# Patient Record
Sex: FEMALE | Race: WHITE
[De-identification: names, ages, dates, MRNs, and addresses within clinical notes are randomized per-mention and may not be internally consistent; named-entity substitution may affect disease eponyms.]

---

## 2017-11-29 ENCOUNTER — HOSPITAL ENCOUNTER (EMERGENCY)
Dept: HOSPITAL 83 - ED | Age: 27
Discharge: HOME | End: 2017-11-29
Payer: SELF-PAY

## 2017-11-29 VITALS — BODY MASS INDEX: 45.99 KG/M2 | HEIGHT: 66.97 IN | WEIGHT: 293 LBS

## 2017-11-29 DIAGNOSIS — R11.0: ICD-10-CM

## 2017-11-29 DIAGNOSIS — F41.9: ICD-10-CM

## 2017-11-29 DIAGNOSIS — R07.9: Primary | ICD-10-CM

## 2017-11-29 DIAGNOSIS — I10: ICD-10-CM

## 2017-11-29 DIAGNOSIS — R00.2: ICD-10-CM

## 2017-11-29 LAB
ALBUMIN SERPL-MCNC: 4.1 GM/DL (ref 3.1–4.5)
ALP SERPL-CCNC: 89 U/L (ref 45–117)
ALT SERPL W P-5'-P-CCNC: 37 U/L (ref 12–78)
APTT PPP: 25.9 SECONDS (ref 20.8–31.5)
AST SERPL-CCNC: 20 IU/L (ref 3–35)
BASOPHILS # BLD AUTO: 0.1 10*3/UL (ref 0–0.1)
BASOPHILS NFR BLD AUTO: 0.3 % (ref 0–1)
BUN SERPL-MCNC: 10 MG/DL (ref 7–24)
CHLORIDE SERPL-SCNC: 105 MMOL/L (ref 98–107)
CK MB SERPL-MCNC: < 0.5 NG/ML (ref 0.5–3.6)
CK SERPL-CCNC: 64 U/L (ref 26–192)
CREAT SERPL-MCNC: 0.73 MG/DL (ref 0.55–1.02)
EOSINOPHIL # BLD AUTO: 0.3 10*3/UL (ref 0–0.4)
EOSINOPHIL # BLD AUTO: 1.5 % (ref 1–4)
ERYTHROCYTE [DISTWIDTH] IN BLOOD BY AUTOMATED COUNT: 13 % (ref 0–14.5)
HCT VFR BLD AUTO: 44 % (ref 37–47)
HGB BLD-MCNC: 14.4 G/DL (ref 12–16)
INR BLD: 0.9 (ref 2–3.5)
LYMPHOCYTES # BLD AUTO: 3.4 10*3/UL (ref 1.3–4.4)
LYMPHOCYTES NFR BLD AUTO: 19.7 % (ref 27–41)
MCH RBC QN AUTO: 28.7 PG (ref 27–31)
MCHC RBC AUTO-ENTMCNC: 32.7 G/DL (ref 33–37)
MCV RBC AUTO: 87.6 FL (ref 81–99)
MONOCYTES # BLD AUTO: 1.1 10*3/UL (ref 0.1–1)
MONOCYTES NFR BLD MANUAL: 6.3 % (ref 3–9)
NEUT #: 12.2 10*3/UL (ref 2.3–7.9)
NEUT %: 71.8 % (ref 47–73)
NRBC BLD QL AUTO: 0 % (ref 0–0)
PLATELET # BLD AUTO: 327 10*3/UL (ref 130–400)
PMV BLD AUTO: 10.4 FL (ref 9.6–12.3)
POTASSIUM SERPL-SCNC: 3.8 MMOL/L (ref 3.5–5.1)
PROT SERPL-MCNC: 7.9 GM/DL (ref 6.4–8.2)
RBC # BLD AUTO: 5.02 10*6/UL (ref 4.1–5.1)
SODIUM SERPL-SCNC: 138 MMOL/L (ref 136–145)
T4 FREE SERPL-MCNC: 0.73 NG/DL (ref 0.76–1.46)
TROPONIN I SERPL-MCNC: < 0.015 NG/ML (ref ?–0.04)
TSH SERPL DL<=0.005 MIU/L-ACNC: 3.46 UIU/ML (ref 0.36–4.75)
WBC NRBC COR # BLD AUTO: 17 10*3/UL (ref 4.8–10.8)

## 2017-11-30 ENCOUNTER — HOSPITAL ENCOUNTER (EMERGENCY)
Dept: HOSPITAL 83 - ED | Age: 27
Discharge: HOME | End: 2017-11-30
Payer: SELF-PAY

## 2017-11-30 VITALS — WEIGHT: 293 LBS | HEIGHT: 55 IN

## 2017-11-30 DIAGNOSIS — F41.9: Primary | ICD-10-CM

## 2017-11-30 LAB
ALBUMIN SERPL-MCNC: 3.9 GM/DL (ref 3.1–4.5)
ALP SERPL-CCNC: 83 U/L (ref 45–117)
ALT SERPL W P-5'-P-CCNC: 34 U/L (ref 12–78)
AST SERPL-CCNC: 19 IU/L (ref 3–35)
BASOPHILS # BLD AUTO: 0 10*3/UL (ref 0–0.1)
BASOPHILS NFR BLD AUTO: 0.2 % (ref 0–1)
BUN SERPL-MCNC: 9 MG/DL (ref 7–24)
CHLORIDE SERPL-SCNC: 106 MMOL/L (ref 98–107)
CREAT SERPL-MCNC: 0.63 MG/DL (ref 0.55–1.02)
EOSINOPHIL # BLD AUTO: 0.2 10*3/UL (ref 0–0.4)
EOSINOPHIL # BLD AUTO: 1.3 % (ref 1–4)
ERYTHROCYTE [DISTWIDTH] IN BLOOD BY AUTOMATED COUNT: 12.9 % (ref 0–14.5)
HCT VFR BLD AUTO: 42.9 % (ref 37–47)
HGB BLD-MCNC: 14.2 G/DL (ref 12–16)
LYMPHOCYTES # BLD AUTO: 2.6 10*3/UL (ref 1.3–4.4)
LYMPHOCYTES NFR BLD AUTO: 17.4 % (ref 27–41)
MCH RBC QN AUTO: 28.9 PG (ref 27–31)
MCHC RBC AUTO-ENTMCNC: 33.1 G/DL (ref 33–37)
MCV RBC AUTO: 87.4 FL (ref 81–99)
MONOCYTES # BLD AUTO: 0.8 10*3/UL (ref 0.1–1)
MONOCYTES NFR BLD MANUAL: 5.1 % (ref 3–9)
NEUT #: 11.2 10*3/UL (ref 2.3–7.9)
NEUT %: 75.2 % (ref 47–73)
NRBC BLD QL AUTO: 0 % (ref 0–0)
PLATELET # BLD AUTO: 307 10*3/UL (ref 130–400)
PMV BLD AUTO: 10.3 FL (ref 9.6–12.3)
POTASSIUM SERPL-SCNC: 4 MMOL/L (ref 3.5–5.1)
PROT SERPL-MCNC: 7.5 GM/DL (ref 6.4–8.2)
RBC # BLD AUTO: 4.91 10*6/UL (ref 4.1–5.1)
SODIUM SERPL-SCNC: 140 MMOL/L (ref 136–145)
WBC NRBC COR # BLD AUTO: 14.9 10*3/UL (ref 4.8–10.8)

## 2019-01-08 ENCOUNTER — HOSPITAL ENCOUNTER (EMERGENCY)
Dept: HOSPITAL 83 - ED | Age: 29
Discharge: HOME | End: 2019-01-08
Payer: COMMERCIAL

## 2019-01-08 VITALS — WEIGHT: 293 LBS | HEIGHT: 66.97 IN | BODY MASS INDEX: 45.99 KG/M2

## 2019-01-08 DIAGNOSIS — Z90.49: ICD-10-CM

## 2019-01-08 DIAGNOSIS — N20.1: Primary | ICD-10-CM

## 2019-01-08 LAB
ALBUMIN SERPL-MCNC: 4.1 GM/DL (ref 3.1–4.5)
ALP SERPL-CCNC: 94 U/L (ref 45–117)
ALT SERPL W P-5'-P-CCNC: 35 U/L (ref 12–78)
APPEARANCE UR: (no result)
AST SERPL-CCNC: 20 IU/L (ref 3–35)
BACTERIA #/AREA URNS HPF: (no result) /[HPF]
BASOPHILS # BLD AUTO: 0 10*3/UL (ref 0–0.1)
BASOPHILS NFR BLD AUTO: 0.3 % (ref 0–1)
BILIRUB UR QL STRIP: (no result)
BUN SERPL-MCNC: 10 MG/DL (ref 7–24)
CHLORIDE SERPL-SCNC: 109 MMOL/L (ref 98–107)
COLOR UR: YELLOW
CREAT SERPL-MCNC: 1 MG/DL (ref 0.55–1.02)
EOSINOPHIL # BLD AUTO: 0.3 10*3/UL (ref 0–0.4)
EOSINOPHIL # BLD AUTO: 2 % (ref 1–4)
EPI CELLS #/AREA URNS HPF: (no result) /[HPF]
ERYTHROCYTE [DISTWIDTH] IN BLOOD BY AUTOMATED COUNT: 13.1 % (ref 0–14.5)
GLUCOSE UR QL: NEGATIVE
HCT VFR BLD AUTO: 46.3 % (ref 37–47)
HGB BLD-MCNC: 15.2 G/DL (ref 12–16)
HGB UR QL STRIP: (no result)
KETONES UR QL STRIP: NEGATIVE
LEUKOCYTE ESTERASE UR QL STRIP: NEGATIVE
LIPASE SERPL-CCNC: 80 U/L (ref 73–393)
LYMPHOCYTES # BLD AUTO: 2.3 10*3/UL (ref 1.3–4.4)
LYMPHOCYTES NFR BLD AUTO: 18.4 % (ref 27–41)
MCH RBC QN AUTO: 29.2 PG (ref 27–31)
MCHC RBC AUTO-ENTMCNC: 32.8 G/DL (ref 33–37)
MCV RBC AUTO: 88.9 FL (ref 81–99)
MONOCYTES # BLD AUTO: 0.9 10*3/UL (ref 0.1–1)
MONOCYTES NFR BLD MANUAL: 6.9 % (ref 3–9)
NEUT #: 8.9 10*3/UL (ref 2.3–7.9)
NEUT %: 71.9 % (ref 47–73)
NITRITE UR QL STRIP: NEGATIVE
NRBC BLD QL AUTO: 0 % (ref 0–0)
PH UR STRIP: 5.5 [PH] (ref 5–9)
PLATELET # BLD AUTO: 325 10*3/UL (ref 130–400)
PMV BLD AUTO: 10.4 FL (ref 9.6–12.3)
POTASSIUM SERPL-SCNC: 3.9 MMOL/L (ref 3.5–5.1)
PROT SERPL-MCNC: 8.3 GM/DL (ref 6.4–8.2)
RBC # BLD AUTO: 5.21 10*6/UL (ref 4.1–5.1)
RBC #/AREA URNS HPF: (no result) RBC/HPF (ref 0–2)
SODIUM SERPL-SCNC: 143 MMOL/L (ref 136–145)
SP GR UR: >= 1.03 (ref 1–1.03)
UROBILINOGEN UR STRIP-MCNC: 0.2 E.U./DL (ref 0.2–1)
WBC #/AREA URNS HPF: (no result) WBC/HPF (ref 0–5)
WBC NRBC COR # BLD AUTO: 12.4 10*3/UL (ref 4.8–10.8)

## 2019-01-25 ENCOUNTER — HOSPITAL ENCOUNTER (OUTPATIENT)
Dept: HOSPITAL 83 - US | Age: 29
Discharge: HOME | End: 2019-01-25
Attending: NURSE PRACTITIONER
Payer: COMMERCIAL

## 2019-01-25 DIAGNOSIS — T83.32XA: Primary | ICD-10-CM

## 2020-02-19 ENCOUNTER — HOSPITAL ENCOUNTER (OUTPATIENT)
Age: 30
Discharge: HOME | End: 2020-02-21
Payer: COMMERCIAL

## 2020-02-19 ENCOUNTER — OFFICE VISIT (OUTPATIENT)
Dept: FAMILY MEDICINE CLINIC | Age: 30
End: 2020-02-19
Payer: COMMERCIAL

## 2020-02-19 VITALS
WEIGHT: 293 LBS | OXYGEN SATURATION: 99 % | HEIGHT: 67 IN | DIASTOLIC BLOOD PRESSURE: 80 MMHG | BODY MASS INDEX: 45.99 KG/M2 | HEART RATE: 74 BPM | TEMPERATURE: 97.2 F | SYSTOLIC BLOOD PRESSURE: 128 MMHG

## 2020-02-19 LAB
BILIRUBIN, POC: ABNORMAL
BLOOD URINE, POC: ABNORMAL
CLARITY, POC: ABNORMAL
COLOR, POC: YELLOW
GLUCOSE URINE, POC: ABNORMAL
KETONES, POC: ABNORMAL
LEUKOCYTE EST, POC: ABNORMAL
NITRITE, POC: ABNORMAL
PH, POC: 7
PROTEIN, POC: ABNORMAL
SPECIFIC GRAVITY, POC: 1.02
UROBILINOGEN, POC: 0.2

## 2020-02-19 PROCEDURE — 87088 URINE BACTERIA CULTURE: CPT

## 2020-02-19 PROCEDURE — 99213 OFFICE O/P EST LOW 20 MIN: CPT | Performed by: FAMILY MEDICINE

## 2020-02-19 PROCEDURE — 81002 URINALYSIS NONAUTO W/O SCOPE: CPT | Performed by: FAMILY MEDICINE

## 2020-02-19 ASSESSMENT — ENCOUNTER SYMPTOMS
BACK PAIN: 0
TROUBLE SWALLOWING: 0
SORE THROAT: 0
VOMITING: 0
SINUS PAIN: 0
CHEST TIGHTNESS: 0
NAUSEA: 0
COUGH: 0
DIARRHEA: 0
CONSTIPATION: 0
SHORTNESS OF BREATH: 0
WHEEZING: 0
EYE PAIN: 0
ABDOMINAL PAIN: 0

## 2020-02-19 NOTE — PROGRESS NOTES
2/19/20    Name: Theo Cohen  GXO:[id number]   Sex:female   Age:29 y.o. Chief Complaint   Patient presents with    Dysuria     Patient has dysuria, frequency, and urgency for the past 3 days. She denies gross hematuria and fever. 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 HENT: Negative for congestion, ear pain, sinus pain, sore throat and trouble swallowing. Eyes: Negative for pain. Respiratory: Negative for cough, chest tightness, shortness of breath and wheezing. Cardiovascular: Negative for chest pain, palpitations and leg swelling. Gastrointestinal: Negative for abdominal pain, constipation, diarrhea, nausea and vomiting. Endocrine: Negative for cold intolerance and heat intolerance. Genitourinary: Positive for dysuria, frequency and urgency. Negative for difficulty urinating, hematuria and pelvic pain. Musculoskeletal: Negative for back pain, gait problem and joint swelling. Skin: Negative for rash and wound. Neurological: Negative for dizziness, syncope, light-headedness and headaches. Hematological: Negative for adenopathy. Psychiatric/Behavioral: Negative for confusion, sleep disturbance and suicidal ideas. No current outpatient medications on file. No Known Allergies   No past medical history on file. There are no active problems to display for this patient. No past surgical history on file.    Social History     Tobacco History     Smoking Status  Never Smoker    Smokeless Tobacco Use  Never Used          Alcohol History     Alcohol Use Status  Not Asked          Drug Use     Drug Use Status  Not Asked          Sexual Activity     Sexually Active  Not Asked            /80   Pulse 74   Temp 97.2 °F (36.2 °C)   Ht 5' 7\" (1.702 m)   Wt (!) 321 lb (145.6 kg) SpO2 99%   BMI 50.28 kg/m²     EXAM:   Physical Exam  Vitals signs and nursing note reviewed. Constitutional:       Appearance: Normal appearance. She is well-developed. HENT:      Head: Normocephalic and atraumatic. Right Ear: Tympanic membrane normal.      Left Ear: Tympanic membrane normal.      Nose: Nose normal.      Mouth/Throat:      Mouth: Mucous membranes are moist.   Eyes:      Pupils: Pupils are equal, round, and reactive to light. Neck:      Musculoskeletal: Normal range of motion. Cardiovascular:      Rate and Rhythm: Normal rate and regular rhythm. Pulmonary:      Effort: Pulmonary effort is normal.      Breath sounds: Normal breath sounds. Abdominal:      General: Bowel sounds are normal.      Palpations: Abdomen is soft. Tenderness: There is no right CVA tenderness or left CVA tenderness. Skin:     General: Skin is warm and dry. Neurological:      General: No focal deficit present. Mental Status: She is alert and oriented to person, place, and time. Wojciech was seen today for dysuria. Diagnoses and all orders for this visit:    Dysuria  Comments:  azo till urine cx is back friday  Orders:  -     POCT Urinalysis no Micro  -     Culture, Urine    Dehydration, mild  Comments:  push fluids  less tea  will culture urine and treat as indicated        I independently reviewed and updated the chief complaint, HPI, past medical and surgical history, medications, allergies and ROS as entered by the LPN. Seen by:   Ben Mccartney DO

## 2020-02-22 LAB — URINE CULTURE, ROUTINE: NORMAL

## 2020-02-24 ENCOUNTER — TELEPHONE (OUTPATIENT)
Dept: FAMILY MEDICINE CLINIC | Age: 30
End: 2020-02-24

## 2020-02-24 RX ORDER — CIPROFLOXACIN 500 MG/1
500 TABLET, FILM COATED ORAL 2 TIMES DAILY
Qty: 14 TABLET | Refills: 0 | Status: SHIPPED | OUTPATIENT
Start: 2020-02-24 | End: 2020-03-02

## 2020-03-22 ENCOUNTER — NURSE TRIAGE (OUTPATIENT)
Dept: OTHER | Facility: CLINIC | Age: 30
End: 2020-03-22

## 2020-03-22 NOTE — TELEPHONE ENCOUNTER
Reason for Disposition   General information question, no triage required and triager able to answer question    Protocols used: INFORMATION ONLY CALL-ADULT-    Caller reports she went to [place] for two days and is now required to get authorization to come back to work. Caller is not experiencing any flu like symptoms.

## 2020-05-12 ENCOUNTER — HOSPITAL ENCOUNTER (OUTPATIENT)
Age: 30
Discharge: HOME | End: 2020-05-14
Payer: COMMERCIAL

## 2020-05-12 PROCEDURE — U0003 INFECTIOUS AGENT DETECTION BY NUCLEIC ACID (DNA OR RNA); SEVERE ACUTE RESPIRATORY SYNDROME CORONAVIRUS 2 (SARS-COV-2) (CORONAVIRUS DISEASE [COVID-19]), AMPLIFIED PROBE TECHNIQUE, MAKING USE OF HIGH THROUGHPUT TECHNOLOGIES AS DESCRIBED BY CMS-2020-01-R: HCPCS

## 2020-05-13 LAB
SARS-COV-2: NOT DETECTED
SOURCE: NORMAL

## 2020-05-21 ENCOUNTER — HOSPITAL ENCOUNTER (OUTPATIENT)
Age: 30
Discharge: HOME | End: 2020-05-23
Payer: COMMERCIAL

## 2020-05-21 PROCEDURE — U0003 INFECTIOUS AGENT DETECTION BY NUCLEIC ACID (DNA OR RNA); SEVERE ACUTE RESPIRATORY SYNDROME CORONAVIRUS 2 (SARS-COV-2) (CORONAVIRUS DISEASE [COVID-19]), AMPLIFIED PROBE TECHNIQUE, MAKING USE OF HIGH THROUGHPUT TECHNOLOGIES AS DESCRIBED BY CMS-2020-01-R: HCPCS

## 2020-05-24 LAB
SARS-COV-2: NOT DETECTED
SOURCE: NORMAL

## 2020-06-25 ENCOUNTER — HOSPITAL ENCOUNTER (OUTPATIENT)
Dept: HOSPITAL 83 - LAB | Age: 30
Discharge: HOME | End: 2020-06-25
Attending: NURSE PRACTITIONER
Payer: COMMERCIAL

## 2020-06-25 DIAGNOSIS — I10: ICD-10-CM

## 2020-06-25 DIAGNOSIS — Z32.01: Primary | ICD-10-CM

## 2020-06-25 LAB
ALBUMIN SERPL-MCNC: 3.9 GM/DL (ref 3.1–4.5)
ALP SERPL-CCNC: 66 U/L (ref 45–117)
ALT SERPL W P-5'-P-CCNC: 36 U/L (ref 12–78)
AST SERPL-CCNC: 19 IU/L (ref 3–35)
B-HCG SERPL-ACNC: 1483 MIU/ML (ref 1–3)
BUN SERPL-MCNC: 10 MG/DL (ref 7–24)
CHLORIDE SERPL-SCNC: 107 MMOL/L (ref 98–107)
CHOLEST SERPL-MCNC: 164 MG/DL (ref ?–200)
CREAT SERPL-MCNC: 0.7 MG/DL (ref 0.55–1.02)
ERYTHROCYTE [DISTWIDTH] IN BLOOD BY AUTOMATED COUNT: 13.2 % (ref 0–14.5)
HCT VFR BLD AUTO: 41.2 % (ref 37–47)
HDLC SERPL-MCNC: 49 MG/DL (ref 40–60)
LDLC SERPL DIRECT ASSAY-MCNC: 103 MG/DL (ref 9–159)
MCH RBC QN AUTO: 29 PG (ref 27–31)
MCHC RBC AUTO-ENTMCNC: 32.8 G/DL (ref 33–37)
MCV RBC AUTO: 88.4 FL (ref 81–99)
NRBC BLD QL AUTO: 0 10*3/UL (ref 0–0)
PLATELET # BLD AUTO: 334 10*3/UL (ref 130–400)
PMV BLD AUTO: 10.3 FL (ref 9.6–12.3)
POTASSIUM SERPL-SCNC: 3.8 MMOL/L (ref 3.5–5.1)
PROT SERPL-MCNC: 7.7 GM/DL (ref 6.4–8.2)
RBC # BLD AUTO: 4.66 10*6/UL (ref 4.1–5.1)
SODIUM SERPL-SCNC: 137 MMOL/L (ref 136–145)
TRIGL SERPL-MCNC: 58 MG/DL (ref ?–150)
VLDLC SERPL CALC-MCNC: 12 MG/DL (ref 6–40)
WBC NRBC COR # BLD AUTO: 13.1 10*3/UL (ref 4.8–10.8)

## 2020-07-17 ENCOUNTER — HOSPITAL ENCOUNTER (OUTPATIENT)
Dept: ULTRASOUND IMAGING | Age: 30
Discharge: HOME | End: 2020-07-19
Payer: COMMERCIAL

## 2020-07-17 ENCOUNTER — HOSPITAL ENCOUNTER (OUTPATIENT)
Age: 30
Discharge: HOME | End: 2020-07-17
Payer: COMMERCIAL

## 2020-07-17 LAB
ABO/RH: NORMAL
ACETAMINOPHEN LEVEL: <5 MCG/ML (ref 10–30)
ANTIBODY SCREEN: NORMAL
BASOPHILS ABSOLUTE: 0.04 E9/L (ref 0–0.2)
BASOPHILS RELATIVE PERCENT: 0.3 % (ref 0–2)
BILIRUBIN URINE: NEGATIVE
BLOOD, URINE: NEGATIVE
CLARITY: CLEAR
COLOR: YELLOW
EOSINOPHILS ABSOLUTE: 0.2 E9/L (ref 0.05–0.5)
EOSINOPHILS RELATIVE PERCENT: 1.5 % (ref 0–6)
ETHANOL: <10 MG/DL (ref 0–0.08)
GLUCOSE URINE: NEGATIVE MG/DL
HCT VFR BLD CALC: 40 % (ref 34–48)
HEMOGLOBIN: 13.1 G/DL (ref 11.5–15.5)
IMMATURE GRANULOCYTES #: 0.07 E9/L
IMMATURE GRANULOCYTES %: 0.5 % (ref 0–5)
KETONES, URINE: NEGATIVE MG/DL
LEUKOCYTE ESTERASE, URINE: NEGATIVE
LYMPHOCYTES ABSOLUTE: 1.89 E9/L (ref 1.5–4)
LYMPHOCYTES RELATIVE PERCENT: 14.2 % (ref 20–42)
MCH RBC QN AUTO: 28.8 PG (ref 26–35)
MCHC RBC AUTO-ENTMCNC: 32.8 % (ref 32–34.5)
MCV RBC AUTO: 87.9 FL (ref 80–99.9)
MONOCYTES ABSOLUTE: 0.67 E9/L (ref 0.1–0.95)
MONOCYTES RELATIVE PERCENT: 5 % (ref 2–12)
NEUTROPHILS ABSOLUTE: 10.46 E9/L (ref 1.8–7.3)
NEUTROPHILS RELATIVE PERCENT: 78.5 % (ref 43–80)
NITRITE, URINE: NEGATIVE
PDW BLD-RTO: 13.3 FL (ref 11.5–15)
PH UA: 6 (ref 5–9)
PLATELET # BLD: 265 E9/L (ref 130–450)
PMV BLD AUTO: 10.3 FL (ref 7–12)
PROTEIN UA: NEGATIVE MG/DL
RBC # BLD: 4.55 E12/L (ref 3.5–5.5)
SALICYLATE, SERUM: <0.3 MG/DL (ref 0–30)
SPECIFIC GRAVITY UA: >=1.03 (ref 1–1.03)
TRICYCLIC ANTIDEPRESSANTS SCREEN SERUM: NEGATIVE NG/ML
UROBILINOGEN, URINE: 0.2 E.U./DL
WBC # BLD: 13.3 E9/L (ref 4.5–11.5)

## 2020-07-17 PROCEDURE — G0480 DRUG TEST DEF 1-7 CLASSES: HCPCS

## 2020-07-17 PROCEDURE — 86900 BLOOD TYPING SEROLOGIC ABO: CPT

## 2020-07-17 PROCEDURE — 86592 SYPHILIS TEST NON-TREP QUAL: CPT

## 2020-07-17 PROCEDURE — 36415 COLL VENOUS BLD VENIPUNCTURE: CPT

## 2020-07-17 PROCEDURE — 80307 DRUG TEST PRSMV CHEM ANLYZR: CPT

## 2020-07-17 PROCEDURE — 83020 HEMOGLOBIN ELECTROPHORESIS: CPT

## 2020-07-17 PROCEDURE — 86901 BLOOD TYPING SEROLOGIC RH(D): CPT

## 2020-07-17 PROCEDURE — 76817 TRANSVAGINAL US OBSTETRIC: CPT

## 2020-07-17 PROCEDURE — 87340 HEPATITIS B SURFACE AG IA: CPT

## 2020-07-17 PROCEDURE — 83021 HEMOGLOBIN CHROMOTOGRAPHY: CPT

## 2020-07-17 PROCEDURE — 86703 HIV-1/HIV-2 1 RESULT ANTBDY: CPT

## 2020-07-17 PROCEDURE — 81003 URINALYSIS AUTO W/O SCOPE: CPT

## 2020-07-17 PROCEDURE — 86850 RBC ANTIBODY SCREEN: CPT

## 2020-07-17 PROCEDURE — 85025 COMPLETE CBC W/AUTO DIFF WBC: CPT

## 2020-07-17 PROCEDURE — 86762 RUBELLA ANTIBODY: CPT

## 2020-07-20 LAB
HEPATITIS B SURFACE ANTIGEN INTERPRETATION: NORMAL
HIV-1 AND HIV-2 ANTIBODIES: NORMAL
RPR: NORMAL
RUBELLA ANTIBODY IGG: NORMAL

## 2020-07-27 LAB
Lab: NORMAL
REPORT: NORMAL
THIS TEST SENT TO: NORMAL

## 2020-09-02 ENCOUNTER — ROUTINE PRENATAL (OUTPATIENT)
Dept: OBGYN CLINIC | Age: 30
End: 2020-09-02
Payer: COMMERCIAL

## 2020-09-02 VITALS
HEART RATE: 70 BPM | TEMPERATURE: 97.2 F | DIASTOLIC BLOOD PRESSURE: 85 MMHG | WEIGHT: 293 LBS | BODY MASS INDEX: 52.94 KG/M2 | SYSTOLIC BLOOD PRESSURE: 122 MMHG

## 2020-09-02 PROCEDURE — 99203 OFFICE O/P NEW LOW 30 MIN: CPT | Performed by: OBSTETRICS & GYNECOLOGY

## 2020-09-02 PROCEDURE — 81002 URINALYSIS NONAUTO W/O SCOPE: CPT | Performed by: OBSTETRICS & GYNECOLOGY

## 2020-09-02 PROCEDURE — 76805 OB US >/= 14 WKS SNGL FETUS: CPT | Performed by: OBSTETRICS & GYNECOLOGY

## 2020-09-02 RX ORDER — DOCUSATE SODIUM 100 MG/1
100 CAPSULE, LIQUID FILLED ORAL DAILY
COMMUNITY
End: 2021-11-22

## 2020-09-02 NOTE — PATIENT INSTRUCTIONS
thinking about giving birth, the health of your baby, and becoming a parent. You can learn to cope with any anxiety and stress you feel. Follow-up care is a key part of your treatment and safety. Be sure to make and go to all appointments, and call your doctor if you are having problems. It's also a good idea to know your test results and keep a list of the medicines you take. How can you care for yourself at home? Reduce stress  · Ask for help with cooking and housekeeping. · Figure out who or what causes your stress. Avoid these people or situations as much as possible. · Relax every day. Taking 10- to 15-minute breaks can make a big difference. Take a walk, listen to music, or take a warm bath. · Learn relaxation techniques at prenatal or yoga class. Or buy a relaxation tape. · List your fears about having a baby and becoming a parent. Share the list with someone you trust. Decide which worries are really small, and try to let them go. Exercise  · If you did not exercise much before pregnancy, start slowly. Walking is best. [place] yourself, and do a little more every day. · Brisk walking, easy jogging, low-impact aerobics, water aerobics, and yoga are good choices. Some sports, such as scuba diving, horseback riding, downhill skiing, gymnastics, and water skiing, are not a good idea. · Try to do at least 2½ hours a week of moderate exercise, such as a fast walk. One way to do this is to be active 30 minutes a day, at least 5 days a week. It's fine to be active in blocks of 10 minutes or more throughout your day and week. · Wear loose clothing. And wear shoes and a bra that provide good support. · Warm up and cool down to start and finish your exercise. · If you want to use weights, be sure to use light weights. They reduce stress on your joints. Stay at the best weight for you  · Experts recommend that you gain about 1 pound a month during the first 3 months of your pregnancy.   · Experts recommend that you gain about 1 pound a week during your last 6 months of pregnancy, for a total weight gain of 25 to 35 pounds. · If you are underweight, you will need to gain more weight (about 28 to 40 pounds). · If you are overweight, you may not need to gain as much weight (about 15 to 25 pounds). · If you are gaining weight too fast, use common sense. Exercise every day, and limit sweets, fast foods, and fats. Choose lean meats, fruits, and vegetables. · If you are having twins or more, your doctor may refer you to a dietitian. Where can you learn more? Go to https://RazerpeBitiumeb.Sand 9. org and sign in to your The News Funnel account. Enter J077 in the TicketStumbler box to learn more about \"Weeks 14 to 18 of Your Pregnancy: Care Instructions. \"     If you do not have an account, please click on the \"Sign Up Now\" link. Current as of: February 11, 2020               Content Version: 12.5  © 8041-6398 [employer]. Care instructions adapted under license by [facility] ([facility]). If you have questions about a medical condition or this instruction, always ask your healthcare professional. [place] [address] any warranty or liability for your use of this information. Patient Education        Learning About When to Call Your Doctor During Pregnancy (Up to 20 Weeks)  Your Care Instructions     It's common to have concerns about what might be a problem during pregnancy. Although most pregnant women don't have any serious problems, it's important to know when to call your doctor if you have certain symptoms. These are general suggestions. Your doctor may give you some more information about when to call. When to call your doctor (up to 20 weeks)  [id number] anytime you think you may need emergency care. For example, call if:  · You passed out (lost consciousness). Call your doctor now or seek immediate medical care if:  · You have a fever. · You have vaginal bleeding.   · You are dizzy or lightheaded, or you feel like you may faint. · You have symptoms of a urinary tract infection. These may include:  ? Pain or burning when you urinate. ? A frequent need to urinate without being able to pass much urine. ? Pain in the flank, which is just below the rib cage and above the waist on either side of the back. ? Blood in your urine. · You have belly pain. · You think you are having contractions. · You have a sudden release of fluid from your vagina. Watch closely for changes in your health, and be sure to contact your doctor if:  · You have vaginal discharge that smells bad. · You have other concerns about your pregnancy. Follow-up care is a key part of your treatment and safety. Be sure to make and go to all appointments, and call your doctor if you are having problems. It's also a good idea to know your test results and keep a list of the medicines you take. Where can you learn more? Go to https://BerkÃ¤na Wirelesspeadaneweb.healthPhone [email or web address]. org and sign in to your Vive Nano account. Enter Z955 in the Godengo box to learn more about \"Learning About When to Call Your Doctor During Pregnancy (Up to 20 Weeks). \"     If you do not have an account, please click on the \"Sign Up Now\" link. Current as of: February 11, 2020               Content Version: 12.5  © 5979-0652 Healthwise, Incorporated. Care instructions adapted under license by [facility] ([facility]). If you have questions about a medical condition or this instruction, always ask your healthcare professional. [place] [address] any warranty or liability for your use of this information.

## 2020-09-13 NOTE — PROGRESS NOTES
Office Visit [phone]          [facility] FETAL MEDICINE  [address] [place] [address].  [place], [place]. [address]  Ph: [phone]    Fax: [phone]   September 2, 2020    RE: Esme Patterson  [birth year]  Dear Dr. Roosevelt Quiñones : Thank you for sending   Ms. Arredondo    for consultation and ultrasound in our office on  9.2.2020. REASON FOR CONSULTATION:    Preexisting Hypertension o10.0  - on Labetalol    Family history of inheritable trait o35.2    High risk pregnancy o09.89   Obesity o99.21   Abn Screen result o28.9  -  Yayo Hermosa  Fragile X      Her chart was reviewed, her medical, surgical , and OBG history was examined along with any supporting documents available to me .  Her recent clinical visits and notes were reviewed.  Her recent laboratory and pathology  testing was reviewed    Review of Systems :    CONSTITUTIONAL: No fever, no chills , no undue aches, pain    HEENT: No headache, no visual changes, no sore throat . No loss of smell, taste   PULM: No dyspnea, no cough   CARDIO:  No chest pains, no palpitations   GI: No N/V, no D/C, no diarrhea, no abdominal pain     : No dysuria, no vaginal bleeding or fluid leaking or discharge    She reports good fetal movement   PHYSICAL EXAMINATION: VSS - Afebrile  BMI 53   General Appearance: Healthy looking, alert , no acute distress.  Eyes: No pallor, no icterus, no photophobia.  Back: No CVA tenderness.  Abdomen: Soft, non-tender.  Extremities: No pretibial pitting edema    An ultrasound evaluation was done today. Please refer to the enclosed copy of the ultrasound report for further detailed information. ULTRASOUND IMPRESSION:    ? US is not diagnostic for fetal aneuploidy and may not detect all structural defects even if multiple exams are performed. ? Normal ultrasound findings cannot guarantee normal pregnancy outcomes. ? Within developmental and technical limits of ultrasound assessment,   ?  Vertex Female fetus @ 15w 3d  ? Active, responsive baby. The amniotic fluid volume appears normal.   ? The fetus is measuring appropriate for gestational age. ? There has been good interval growth and development . ? Fetal anatomy was reviewed and appears normal.  ? Transvaginal views of cervix appears to be closed, long, without significant funneling upon Valsalva. ? Soft markers for aneuploidy are examined and found to be favorable. - ~Many of the important findings cannot be fully assessed at this gestational age  ? Patient notes fetal movement, no cramping or contraction. No undue tenderness or distress during exam.   DISCUSSION : Overall favorable report today. The patient's fragile X carrier screen was reported as being in the gray zone. The test indicates that the patient is not a carrier of a fragile X expansion mutation. There is no known risk for her to have an affected pregnancy, however minor increases or decreases in the size of this mutation may occur when past to her project any such that the offspring could inherit a small pre-mutation, which could then carry down to her grandchildren , who could possibly be affected. WE discussed the ACMG and ACOG recommendations that diagnostic fetal testing ( amniocentesis ) be offered if mom is the carrier and has > 55repeats . Jaqueline Hanley has 48 and 30 -  ( Not recommended)  We discussed the risks inherent with her size as well . We discussed the pros and cons of  testing- and discussed why her daughter is not at risk, and perhaps this testing would best be deferred until she is ready to start her own family and can make that decision for herself. RECOMMENDATIONS:  1. Mid trimester concerns and precautions reviewed with patient. Discussed fetal movements and the role of  testing to help optimize growth and development and help predict/ avoid stress. Discussed trouble signs to watch for.   2. The patient is to continue to follow with you in your office for ongoing obstetric care. 3. I suggested the patient return for a follow-up assessment in4 weeks unless there is a clinical reason for her to return prior to that time. 4.  We have scheduled further [facility] visits today. 5. She is to call if she has any problems or questions prior to her next visit. 6. Further evaluation and management will be dependent on her clinical presentation and the results of her testing. Once again, thank you for allowing us to participate in the care of this patient and if we can be of any further assistance to you, please do not hesitate to contact us. Sincerely,      Milagros Aguila MD  I spent 31 minutes with the patient of which greater than 50% of the time was used to  the patient, discuss complications and problems related to her pregnancy, or coordinating her care. I answered all of her questions to her satisfaction.

## 2020-09-14 LAB
GLUCOSE URINE, POC: NORMAL
PROTEIN UA: NEGATIVE

## 2020-09-16 ENCOUNTER — HOSPITAL ENCOUNTER (EMERGENCY)
Age: 30
Discharge: HOME | End: 2020-09-16
Attending: EMERGENCY MEDICINE
Payer: COMMERCIAL

## 2020-09-16 VITALS
OXYGEN SATURATION: 99 % | DIASTOLIC BLOOD PRESSURE: 60 MMHG | HEART RATE: 89 BPM | WEIGHT: 293 LBS | RESPIRATION RATE: 16 BRPM | SYSTOLIC BLOOD PRESSURE: 118 MMHG | HEIGHT: 67 IN | BODY MASS INDEX: 45.99 KG/M2 | TEMPERATURE: 98.3 F

## 2020-09-16 LAB
BACTERIA: ABNORMAL /HPF
BILIRUBIN URINE: NEGATIVE
BLOOD, URINE: NEGATIVE
CLARITY: CLEAR
COLOR: YELLOW
EPITHELIAL CELLS, UA: ABNORMAL /HPF
GLUCOSE URINE: NEGATIVE MG/DL
KETONES, URINE: NEGATIVE MG/DL
LEUKOCYTE ESTERASE, URINE: NEGATIVE
NITRITE, URINE: NEGATIVE
PH UA: 6 (ref 5–9)
PROTEIN UA: NEGATIVE MG/DL
RBC UA: ABNORMAL /HPF (ref 0–2)
SPECIFIC GRAVITY UA: 1.02 (ref 1–1.03)
UROBILINOGEN, URINE: 0.2 E.U./DL
WBC UA: ABNORMAL /HPF (ref 0–5)

## 2020-09-16 PROCEDURE — 6370000000 HC RX 637 (ALT 250 FOR IP): Performed by: STUDENT IN AN ORGANIZED HEALTH CARE EDUCATION/TRAINING PROGRAM

## 2020-09-16 PROCEDURE — 6370000000 HC RX 637 (ALT 250 FOR IP): Performed by: EMERGENCY MEDICINE

## 2020-09-16 PROCEDURE — 81001 URINALYSIS AUTO W/SCOPE: CPT

## 2020-09-16 PROCEDURE — 99284 EMERGENCY DEPT VISIT MOD MDM: CPT

## 2020-09-16 PROCEDURE — 87088 URINE BACTERIA CULTURE: CPT

## 2020-09-16 PROCEDURE — 99283 EMERGENCY DEPT VISIT LOW MDM: CPT

## 2020-09-16 RX ORDER — ACETAMINOPHEN 500 MG
500 TABLET ORAL ONCE
Status: COMPLETED | OUTPATIENT
Start: 2020-09-16 | End: 2020-09-16

## 2020-09-16 RX ORDER — LIDOCAINE 4 G/G
1 PATCH TOPICAL ONCE
Status: DISCONTINUED | OUTPATIENT
Start: 2020-09-16 | End: 2020-09-16 | Stop reason: HOSPADM

## 2020-09-16 RX ORDER — LIDOCAINE 4 G/G
1 PATCH TOPICAL DAILY
Status: DISCONTINUED | OUTPATIENT
Start: 2020-09-16 | End: 2020-09-16

## 2020-09-16 RX ADMIN — ACETAMINOPHEN 500 MG: 500 TABLET ORAL at 04:35

## 2020-09-16 ASSESSMENT — PAIN SCALES - GENERAL
PAINLEVEL_OUTOF10: 4

## 2020-09-16 ASSESSMENT — ENCOUNTER SYMPTOMS
VOMITING: 0
DIARRHEA: 0
PHOTOPHOBIA: 0
COUGH: 0
ABDOMINAL PAIN: 0
NAUSEA: 0
SHORTNESS OF BREATH: 0
BACK PAIN: 1
TROUBLE SWALLOWING: 0

## 2020-09-16 ASSESSMENT — PAIN DESCRIPTION - PAIN TYPE: TYPE: ACUTE PAIN

## 2020-09-16 ASSESSMENT — PAIN DESCRIPTION - LOCATION: LOCATION: BACK

## 2020-09-16 ASSESSMENT — PAIN DESCRIPTION - ORIENTATION: ORIENTATION: LEFT

## 2020-09-16 ASSESSMENT — PAIN DESCRIPTION - DESCRIPTORS: DESCRIPTORS: SHOOTING;SHARP

## 2020-09-16 NOTE — ED PROVIDER NOTES
The patient is a 27year-old-female currently 17-weeks-pregnant who presents to the Emergency Department complaining of back pain. She stats she has been experiencing left sided back pain over the past three days that has become progressively worse. She tried taking Tylenol at home with little relief. Last dose of Tylenol was 500 mg last night around 11:30 PM. She also tried icy hot at home. There have been no complications with her pregnancy. Her OBGYN is Dr. Brandon Cook and she has an appointment with him today. Denies any fever, chills, dysuria, hematuria, vaginal bleeding, vaginal discharge, leakage of fluid, fall, trauma, injuries, history of prior back issues, or other acute symptoms or concerns. The history is provided by the patient. Back Pain   Location:  Thoracic spine  Quality:  Aching  Radiates to:  Does not radiate  Pain severity:  Moderate  Pain is:  Same all the time  Onset quality:  Gradual  Timing:  Constant  Progression:  Worsening  Chronicity:  New  Relieved by:  None tried  Worsened by:  Nothing  Ineffective treatments:  None tried  Associated symptoms: no abdominal pain, no chest pain, no dysuria, no fever, no headaches, no numbness and no weakness         Review of Systems   Constitutional: Negative for chills, fatigue and fever. HENT: Negative for congestion and trouble swallowing. Eyes: Negative for photophobia and visual disturbance. Respiratory: Negative for cough and shortness of breath. Cardiovascular: Negative for chest pain and leg swelling. Gastrointestinal: Negative for abdominal pain, diarrhea, nausea and vomiting. Genitourinary: Negative for difficulty urinating, dysuria, flank pain, frequency and hematuria. Musculoskeletal: Positive for back pain. Negative for neck pain. Skin: Negative for rash and wound. Neurological: Negative for dizziness, syncope, weakness, light-headedness, numbness and headaches. All other systems reviewed and are negative. Physical Exam  Vitals signs and nursing note reviewed. Constitutional:       General: She is not in acute distress. Appearance: She is well-developed. She is morbidly obese. She is not ill-appearing, toxic-appearing or diaphoretic. HENT:      Head: Normocephalic and atraumatic. Nose: Nose normal.      Mouth/Throat:      Lips: Pink. No lesions. Mouth: Mucous membranes are moist.   Eyes:      General: Lids are normal.   Neck:      Musculoskeletal: Normal range of motion and neck supple. Cardiovascular:      Rate and Rhythm: Normal rate and regular rhythm. Heart sounds: Normal heart sounds, S1 normal and S2 normal. No murmur. Pulmonary:      Effort: Pulmonary effort is normal. No respiratory distress. Breath sounds: Normal breath sounds and air entry. No decreased breath sounds, wheezing, rhonchi or rales. Abdominal:      General: Bowel sounds are normal.      Palpations: Abdomen is soft. Tenderness: There is no abdominal tenderness. There is no guarding or rebound. Musculoskeletal:      Thoracic back: She exhibits tenderness and pain (tenderness to palpation over the left paraspinal musculature. No spinous process tenderness, no  evidence of trauma. ). She exhibits no bony tenderness, no swelling, no edema and no deformity. Lumbar back: She exhibits tenderness and pain. She exhibits normal range of motion, no bony tenderness and no swelling. Skin:     General: Skin is warm and dry. Neurological:      Mental Status: She is alert and oriented to person, place, and time. Motor: No tremor or abnormal muscle tone. Coordination: Coordination normal.          Procedures     MDM  Number of Diagnoses or Management Options  Strain of lumbar region, initial encounter:   Diagnosis management comments: The patient is a 27year-old-female who presents to the ED complaining of back pain. She is hemodynamically stable, non-toxic, and in no acute distress.  Clinically patient's presentation and symptoms seem more muscular in etiology. She denied any red flag back pain signs or symptoms. However, since she is pregnant, did obtain UA, which was negative for acute infection. Treated with a dose of Tylenol and Lidocaine patch. Recommended to follow up with OB for her appointment today. She is to return here for worsening symptoms or other acute symptoms or concerns. Patient verbalized understanding and agreement to plan and discharge instructions.                       --------------------------------------------- PAST HISTORY ---------------------------------------------  Past Medical History:  has a past medical history of Hypertension and PCOS (polycystic ovarian syndrome). Past Surgical History:  has a past surgical history that includes Appendectomy (2013). Social History:  reports that she has never smoked. She has never used smokeless tobacco. She reports previous alcohol use. She reports that she does not use drugs. Family History: family history is not on file. The patients home medications have been reviewed. Allergies: Patient has no known allergies.     -------------------------------------------------- RESULTS -------------------------------------------------  Labs:  Results for orders placed or performed during the hospital encounter of 09/16/20   Urinalysis with Microscopic   Result Value Ref Range    Color, UA Yellow Straw/Yellow    Clarity, UA Clear Clear    Glucose, Ur Negative Negative mg/dL    Bilirubin Urine Negative Negative    Ketones, Urine Negative Negative mg/dL    Specific Gravity, UA 1.025 1.005 - 1.030    Blood, Urine Negative Negative    pH, UA 6.0 5.0 - 9.0    Protein, UA Negative Negative mg/dL    Urobilinogen, Urine 0.2 <2.0 E.U./dL    Nitrite, Urine Negative Negative    Leukocyte Esterase, Urine Negative Negative    WBC, UA 2-5 0 - 5 /HPF    RBC, UA 0-1 0 - 2 /HPF    Epithelial Cells, UA MODERATE /HPF    Bacteria, UA MANY (A) None

## 2020-09-18 LAB — URINE CULTURE, ROUTINE: NORMAL

## 2020-10-02 ENCOUNTER — ROUTINE PRENATAL (OUTPATIENT)
Dept: OBGYN CLINIC | Age: 30
End: 2020-10-02
Payer: COMMERCIAL

## 2020-10-02 VITALS
TEMPERATURE: 97.6 F | DIASTOLIC BLOOD PRESSURE: 85 MMHG | SYSTOLIC BLOOD PRESSURE: 124 MMHG | HEART RATE: 86 BPM | BODY MASS INDEX: 51.84 KG/M2 | WEIGHT: 293 LBS

## 2020-10-02 PROCEDURE — 99213 OFFICE O/P EST LOW 20 MIN: CPT | Performed by: OBSTETRICS & GYNECOLOGY

## 2020-10-02 PROCEDURE — 76811 OB US DETAILED SNGL FETUS: CPT | Performed by: OBSTETRICS & GYNECOLOGY

## 2020-10-02 PROCEDURE — 81002 URINALYSIS NONAUTO W/O SCOPE: CPT | Performed by: OBSTETRICS & GYNECOLOGY

## 2020-10-02 PROCEDURE — 99214 OFFICE O/P EST MOD 30 MIN: CPT | Performed by: OBSTETRICS & GYNECOLOGY

## 2020-10-02 PROCEDURE — 76817 TRANSVAGINAL US OBSTETRIC: CPT | Performed by: OBSTETRICS & GYNECOLOGY

## 2020-10-02 NOTE — PATIENT INSTRUCTIONS
Please arrive for your scheduled appointment at least 15 minutes early with your actual insurance card+ a photo ID. Also if you need any refills ordered or have questions, it may take up 48 hours to reply. Please allow ample time for your refills. Call me when you use last refill. Thank you for your cooperation. Any questions contact [facility] at [phone]. If you are experiencing an emergency and need immediate help, call 911 or go to go emergency room or labor and delivery. if you are sick, not feeling well or have an infectious process going on please reschedule your appointment by calling [phone]. Also if any family members are not feeling well, please do not bring them to your appointment. We appreciate your cooperation. We are doing this in order to protect our pregnant mothers+ their babies. Call your primary obstetrician with bleeding, leaking of fluid, abdominal tenderness, headache, blurry vision, epigastric pain and increased urinary frequency. Patient Education        Weeks 18 to 22 of Your Pregnancy: Care Instructions  Your Care Instructions     Your baby is continuing to develop quickly. At this stage, babies can now suck their thumbs,  firmly with their hands, and open and close their eyelids. Sometime between 18 and 22 weeks, you will start to feel your baby move. At first, these small fetal movements feel like fluttering or \"butterflies. \" Some women say that they feel like gas bubbles. As the baby grows, these movements will become stronger. You may also notice that your baby kicks and hiccups. During this time, you may find that your nausea and fatigue are gone. Overall, you may feel better and have more energy than you did in your first trimester. But you may also have new discomforts now, such as sleep problems or leg cramps. This care sheet can help you ease these discomforts. Follow-up care is a key part of your treatment and safety.  Be sure to make and go to all appointments, and Education        Learning About When to Call Your Doctor During Pregnancy (Up to 20 Weeks)  Your Care Instructions     It's common to have concerns about what might be a problem during pregnancy. Although most pregnant women don't have any serious problems, it's important to know when to call your doctor if you have certain symptoms. These are general suggestions. Your doctor may give you some more information about when to call. When to call your doctor (up to 20 weeks)  [id number] anytime you think you may need emergency care. For example, call if:  · You passed out (lost consciousness). Call your doctor now or seek immediate medical care if:  · You have a fever. · You have vaginal bleeding. · You are dizzy or lightheaded, or you feel like you may faint. · You have symptoms of a urinary tract infection. These may include:  ? Pain or burning when you urinate. ? A frequent need to urinate without being able to pass much urine. ? Pain in the flank, which is just below the rib cage and above the waist on either side of the back. ? Blood in your urine. · You have belly pain. · You think you are having contractions. · You have a sudden release of fluid from your vagina. Watch closely for changes in your health, and be sure to contact your doctor if:  · You have vaginal discharge that smells bad. · You have other concerns about your pregnancy. Follow-up care is a key part of your treatment and safety. Be sure to make and go to all appointments, and call your doctor if you are having problems. It's also a good idea to know your test results and keep a list of the medicines you take. Where can you learn more? Go to https://ny.PlanStan. org and sign in to your My Best Interest account. Enter N978 in the [employer] box to learn more about \"Learning About When to Call Your Doctor During Pregnancy (Up to 20 Weeks). \"     If you do not have an account, please click on the \"Sign Up Now\" link.  Current as of: February 11, 2020               Content Version: 12.5  © 8095-4319 [employer]. Care instructions adapted under license by [facility] ([facility]). If you have questions about a medical condition or this instruction, always ask your healthcare professional. [address] any warranty or liability for your use of this information.

## 2020-10-15 LAB
GLUCOSE URINE, POC: NEGATIVE
PROTEIN UA: POSITIVE

## 2020-10-15 NOTE — PROGRESS NOTES
[address] [place], [place]. [address]  Ph: [phone]    Fax: [phone]   October 2, 2020     RE: Jacquelin Grissom  [birth year]  Dear  [facility] : Thank you for sending  Ms. Arredondo for consultation and ultrasound in our office on  10.2.2020. REASON FOR CONSULTATION:   · Preexisting Hypertension o10.0  - on Labetalol 300mg TID  · Family history of inheritable trait o35.2   · High risk pregnancy o09.89  · Obesity o99.21  · Abn Screen result o28.9  -  Gara Nay  Fragile X   · Fetal Anatomy Survey z36  ·      · Her chart was reviewed, her medical, surgical , and OBG history was examined along with any supporting documents available to me . · Her recent clinical visits and notes were reviewed. · Her recent laboratory and pathology  testing was reviewed     Review of Systems :   · CONSTITUTIONAL: No fever, no chills , no undue aches, pain   · HEENT: No headache, no visual changes, no sore throat . No loss of smell, taste  · PULM: No dyspnea, no cough  · CARDIO:  No chest pains, no palpitations  · GI: No N/V, no D/C, no diarrhea, no abdominal pain   ·  : No dysuria, no vaginal bleeding or fluid leaking or discharge   · She reports good fetal movement   PHYSICAL EXAMINATION: VSS - Afebrile  BMI 52  · General Appearance: Healthy looking, alert , no acute distress. · Eyes: No pallor, no icterus, no photophobia. · Back: No CVA tenderness. · Abdomen: Soft, non-tender. · Extremities: No pretibial pitting edema     An ultrasound evaluation was done today. Please refer to the enclosed copy of the ultrasound report for further detailed information. ULTRASOUND IMPRESSION:    ? US is not diagnostic for fetal aneuploidy and may not detect all structural defects even if multiple exams are performed. ? Normal ultrasound findings cannot guarantee normal pregnancy outcomes. ? Within developmental and technical limits of ultrasound assessment,   ?  Vertex Female fetus @  19w 5d  ? Active, responsive baby. The amniotic fluid volume appears normal.   ? The fetus is measuring appropriate for gestational age. ? There has been good interval growth and development . ? Fetal anatomy was reviewed and appears normal.  - Views are limited by position, size  ? Transvaginal views of cervix appears to be closed, 41-44mm long, without significant funneling upon Valsalva. ? Soft markers for aneuploidy are examined and found to be favorable. - ~Many of the important findings cannot be fully assessed at this gestational age  ? Patient notes fetal movement, no cramping or contraction. No undue tenderness or distress during exam.   DISCUSSION : Overall favorable report today. ? The patient's fragile X carrier screen was reported as being in the gray zone. The test indicates that the patient is not a carrier of a fragile X expansion mutation. There is no known risk for her to have an affected pregnancy, however minor increases or decreases in the size of this mutation may occur when past to her project any such that the offspring could inherit a small pre-mutation, which could then carry down to her grandchildren , who could possibly be affected. ? WE discussed the ACMG and ACOG recommendations that diagnostic fetal testing ( amniocentesis ) be offered if mom is the carrier and has > 55repeats . Sol has 48 and 30 -  ( Not recommended)  ? We discussed the risks inherent with her size as well . ? We discussed the pros and cons of  testing- and discussed why her daughter is not at risk, and perhaps this testing would best be deferred until she is ready to start her own family and can make that decision for herself. RECOMMENDATIONS:  1. Mid trimester concerns and precautions reviewed with patient. Discussed fetal movements and the role of  testing to help optimize growth and development and help predict/ avoid stress.  Discussed trouble signs to watch for.  2. The patient is to continue to follow with you in your office for ongoing obstetric care. 3. I suggested the patient return for a follow-up assessment in 4 weeks unless there is a clinical reason for her to return prior to that time. 4.  We have scheduled further [facility] visits today. 5. She is to call if she has any problems or questions prior to her next visit. 6. Further evaluation and management will be dependent on her clinical presentation and the results of her testing. Once again, thank you for allowing us to participate in the care of this patient and if we can be of any further assistance to you, please do not hesitate to contact us. Sincerely,        Sara Zhong MD  I spent 26 minutes with the patient of which greater than 50% of the time was used to  the patient, discuss complications and problems related to her pregnancy, or coordinating her care.  I answered all of her questions to her

## 2020-10-29 ENCOUNTER — HOSPITAL ENCOUNTER (OUTPATIENT)
Age: 30
Discharge: HOME | End: 2020-10-29
Payer: COMMERCIAL

## 2020-10-29 PROCEDURE — 86336 INHIBIN A: CPT

## 2020-10-29 PROCEDURE — 84702 CHORIONIC GONADOTROPIN TEST: CPT

## 2020-10-29 PROCEDURE — 82677 ASSAY OF ESTRIOL: CPT

## 2020-10-29 PROCEDURE — 82105 ALPHA-FETOPROTEIN SERUM: CPT

## 2020-10-30 ENCOUNTER — ROUTINE PRENATAL (OUTPATIENT)
Dept: OBGYN CLINIC | Age: 30
End: 2020-10-30
Payer: COMMERCIAL

## 2020-10-30 VITALS
WEIGHT: 293 LBS | HEART RATE: 87 BPM | DIASTOLIC BLOOD PRESSURE: 72 MMHG | SYSTOLIC BLOOD PRESSURE: 107 MMHG | HEIGHT: 67 IN | TEMPERATURE: 97.7 F | BODY MASS INDEX: 45.99 KG/M2

## 2020-10-30 PROBLEM — Z3A.23 23 WEEKS GESTATION OF PREGNANCY: Status: ACTIVE | Noted: 2020-10-30

## 2020-10-30 LAB
GLUCOSE URINE, POC: NEGATIVE
PROTEIN UA: POSITIVE

## 2020-10-30 PROCEDURE — 76816 OB US FOLLOW-UP PER FETUS: CPT | Performed by: OBSTETRICS & GYNECOLOGY

## 2020-10-30 PROCEDURE — 99214 OFFICE O/P EST MOD 30 MIN: CPT | Performed by: OBSTETRICS & GYNECOLOGY

## 2020-10-30 PROCEDURE — 99213 OFFICE O/P EST LOW 20 MIN: CPT | Performed by: OBSTETRICS & GYNECOLOGY

## 2020-10-30 PROCEDURE — 81002 URINALYSIS NONAUTO W/O SCOPE: CPT | Performed by: OBSTETRICS & GYNECOLOGY

## 2020-10-30 NOTE — PATIENT INSTRUCTIONS
Patient Education        Weeks 22 to 26 of Your Pregnancy: Care Instructions  Your Care Instructions     As you enter your 7th month of pregnancy at week 26, your baby's lungs are growing stronger and getting ready to breathe. You may notice that your baby responds to the sound of your or your partner's voice. You may also notice that your baby does less turning and twisting and more squirming or jerking. Jerking often means that your baby has the hiccups. Hiccups are perfectly normal and are only temporary. You may want to think about attending a childbirth preparation class. This is also a good time to start thinking about whether you want to have pain medicine during labor. Most pregnant women are tested for gestational diabetes between weeks 25 and 28. Gestational diabetes occurs when your blood sugar level gets too high when you're pregnant. The test is important, because you can have gestational diabetes and not know it. But the condition can cause problems for your baby. Follow-up care is a key part of your treatment and safety. Be sure to make and go to all appointments, and call your doctor if you are having problems. It's also a good idea to know your test results and keep a list of the medicines you take. How can you care for yourself at home? Ease discomfort from your baby's kicking  · Change your position. Sometimes this will cause your baby to change position too. · Take a deep breath while you raise your arm over your head. Then breathe out while you drop your arm. Do Kegel exercises to prevent urine from leaking  · You can do Kegel exercises while you stand or sit. ? Squeeze the same muscles you would use to stop your urine. Your belly and thighs should not move. ? Hold the squeeze for 3 seconds, and then relax for 3 seconds. ? Start with 3 seconds. Then add 1 second each week until you are able to squeeze for 10 seconds. ? Repeat the exercise 10 to 15 times for each session.  Do three or more sessions each day. Ease or reduce swelling in your feet, ankles, hands, and fingers  · If your fingers are puffy, take off your rings. · Do not eat high-salt foods, such as potato chips. · Prop up your feet on a stool or couch as much as possible. Sleep with pillows under your feet. · Do not stand for long periods of time or wear tight shoes. · Wear support stockings. Where can you learn more? Go to https://Pantea.Postcard & Tag. org and sign in to your GoGo Tech account. Enter G264 in the [employer]ed box to learn more about \"Weeks 22 to 26 of Your Pregnancy: Care Instructions. \"     If you do not have an account, please click on the \"Sign Up Now\" link. Current as of: February 11, 2020               Content Version: 12.6  © 9947-8452 [employer], [employer]. Care instructions adapted under license by [facility] ([facility]). If you have questions about a medical condition or this instruction, always ask your healthcare professional. [address] [address] any warranty or liability for your use of this information. Patient Education        Learning About When to Call Your Doctor During Pregnancy (After 20 Weeks)  Your Care Instructions  It's common to have concerns about what might be a problem during pregnancy. Although most pregnant women don't have any serious problems, it's important to know when to call your doctor if you have certain symptoms or signs of labor. These are general suggestions. Your doctor may give you some more information about when to call. When to call your doctor (after 20 weeks)  Call 911 anytime you think you may need emergency care. For example, call if:  · You have severe vaginal bleeding. · You have sudden, severe pain in your belly. · You passed out (lost consciousness). · You have a seizure. · You see or feel the umbilical cord.   · You think you are about to deliver your baby and can't make it safely to the hospital.  Call your doctor now or seek immediate medical care if:  · You have vaginal bleeding. · You have belly pain. · You have a fever. · You have symptoms of preeclampsia, such as:  ? Sudden swelling of your face, hands, or feet. ? New vision problems (such as dimness, blurring, or seeing spots). ? A severe headache. · You have a sudden release of fluid from your vagina. (You think your water broke.)  · You think that you may be in labor. This means that you've had at least 6 contractions in an hour. · You notice that your baby has stopped moving or is moving much less than normal.  · You have symptoms of a urinary tract infection. These may include:  ? Pain or burning when you urinate. ? A frequent need to urinate without being able to pass much urine. ? Pain in the flank, which is just below the rib cage and above the waist on either side of the back. ? Blood in your urine. Watch closely for changes in your health, and be sure to contact your doctor if:  · You have vaginal discharge that smells bad. · You have skin changes, such as:  ? A rash. ? Itching. ? Yellow color to your skin. · You have other concerns about your pregnancy. If you have labor signs at 37 weeks or more  If you have signs of labor at 37 weeks or more, your doctor may tell you to call when your labor becomes more active. Symptoms of active labor include:  · Contractions that are regular. · Contractions that are less than 5 minutes apart. · Contractions that are hard to talk through. Follow-up care is a key part of your treatment and safety. Be sure to make and go to all appointments, and call your doctor if you are having problems. It's also a good idea to know your test results and keep a list of the medicines you take. Where can you learn more? Go to https://chpejohana.healthDailybreak Media. org and sign in to your tok tok tok account.  Enter  in the [employer] box to learn more about \"Learning About When to Call Your Doctor During Pregnancy (After 20 Weeks). \"     If you do not have an account, please click on the \"Sign Up Now\" link. Current as of: February 11, 2020               Content Version: 12.6  © 2006-2020 [employer]. Care instructions adapted under license by [facility] [address] If you have questions about a medical condition or this instruction, always ask your healthcare professional. [address] any warranty or liability for your use of this information. Please arrive for your scheduled appointment at least 15 minutes early with your actual insurance card+ a photo ID. Also if you need any refills ordered or have questions, it may take up 48 hours to reply. Please allow ample time for your refills. Call me when you use last refill. Thank you for your cooperation. Any questions contact Luis at [phone]. If you are experiencing an emergency and need immediate help, call 911 or go to go emergency room or labor and delivery. if you are sick, not feeling well or have an infectious process going on please reschedule your appointment by calling [phone]. Also if any family members are not feeling well, please do not bring them to your appointment. We appreciate your cooperation. We are doing this in order to protect our pregnant mothers+ their babies. Call your primary obstetrician with bleeding, leaking of fluid, abdominal tenderness, headache, blurry vision, epigastric pain and increased urinary frequency.

## 2020-11-01 LAB
AFP INTERPRETATION: NORMAL
AFP MOM: 1.27
AFP SPECIMEN: NORMAL
D-INHIBIN: 162 PG/ML
DATING: NORMAL
EER MATERNAL SCREEN AFP, HCG, EST, INH: NORMAL
ESTIMATED DUE DATE: NORMAL
FETUS COUNT: NORMAL
GESTATIONAL AGE CALC AT COLLECT: NORMAL
HISTORY OF ANEUPLOIDY?: NO
HISTORY/NEURAL TUBE DEFECTS: NORMAL
INSULIN DEP. DIABETIC: NO
MATERNAL AGE AT EDD: 30.9 YR
MATERNAL WEIGHT: NORMAL
MOM FOR HCG, 2ND TRIMESTER: 0.44
MOM FOR UE3: 0.97
MOM INHIBN: 1.25
PATIENT'S HCG, 2ND TRIMESTER: 6489 IU/L
PT AFP: 77 NG/ML
PT UE3: 2.75 NG/ML
RACE: NORMAL
SMOKING: NO

## 2020-11-13 NOTE — PROGRESS NOTES
OFFICE VISIT 10.30.2020       [facility]  [address] [place] [address].  [address] [facility], [place]. [address]  Ph: [phone]    Fax: [phone]   October 30, 2020     RE: Kennis Mcardle  [birth year]  Dear Dr. Lisa Peterson : Thank you for sending  Ms. Arredondo for consultation and followup ultrasound in our office on  10.30.2020. REASON FOR CONSULTATION:   ? Preexisting Hypertension o10.0  - on Labetalol 300mg TID  ? Family history of inheritable trait o35.2   ? High risk pregnancy o09.89  ? Obesity o99.21 - BMI 52  ? Abn Screen result o28.9  -  Heather Plants  Fragile X   ? Fetal Anatomy Survey z36  ? Breech o32.1   Her chart was reviewed, her medical, surgical , and OBG history was examined along with any supporting documents available to me .  ? Her recent clinical visits and notes were reviewed. ? Her recent laboratory and pathology  testing was reviewed  1. Hemoglobinopathy RBC screen reassuring   2. Quad screen reassuring   3. Progenity screen  negative for 28/29 diseases -gray zone Fragile X      Review of Systems :   ? CONSTITUTIONAL: No fever, no chills , no undue aches, pain   ? HEENT: No headache, no visual changes, no sore throat . No loss of smell, taste  ? PULM: No dyspnea, no cough  ? CARDIO:  No chest pains, no palpitations  ? GI: No N/V, no D/C, no diarrhea, no abdominal pain   ?  : No dysuria, no vaginal bleeding or fluid leaking or discharge   ? She reports good fetal movement   PHYSICAL EXAMINATION: VSS - Afebrile  BMI 52  ? General Appearance: Healthy looking, alert , no acute distress. ? Eyes: No pallor, no icterus, no photophobia. ? Back: No CVA tenderness. ? Abdomen: Soft, non-tender. ? Extremities: No pretibial pitting edema     An ultrasound evaluation was done today. Please refer to the enclosed copy of the ultrasound report for further detailed information. ULTRASOUND IMPRESSION:    ? Within developmental and technical limits of ultrasound assessment,   ?  Breech Female fetus @  23w 5d  ? Active, responsive baby. The amniotic fluid volume appears normal.   ? The fetus is measuring appropriate for gestational age  - 1g (1:4) 43%ile. ? There has been good interval growth and development . ? Fetal anatomy was reviewed and appears normal.  - Babys  4 chamber heart view appeared structurally intact, with an intact septum  - Great vessel, valve and outflow tract configuration views limited    Will followup on these views as baby grows     Smaller clinically significant lesions, such as atrial septa defects adjacent to the foramen ovale, may not become detectable until baby arrives. ? Transabdominal views of cervix appears to be closed, long, without significant funneling upon Valsalva. ? Soft markers for aneuploidy are examined and found to be favorable. - ~Many of the important findings cannot be fully assessed at this gestational age  ? Patient notes fetal movement, no cramping or contraction. No undue tenderness or distress during exam.   DISCUSSION : Overall favorable report today. ? The patient's fragile X carrier screen was reported as being in the gray zone. The test indicates that the patient is not a carrier of a fragile X expansion mutation. ? There is no known risk for her to have an affected pregnancy, however minor increases or decreases in the size of this mutation may occur when past to her project any such that the offspring could inherit a small pre-mutation, which could then carry down to her grandchildren , who could possibly be affected. ? WE discussed the ACMG and ACOG recommendations that diagnostic fetal testing ( amniocentesis ) be offered if mom is the carrier and has > 55repeats . ? Sol has 48 and 30 -  ( Not recommended)  ? We discussed the risks inherent with her size as well . ?  We discussed the pros and cons of  testing- and discussed why her daughter is not at risk, and perhaps this testing would best be deferred until she is ready to start her own family and can make that decision for herself. RECOMMENDATIONS:  ? Mid trimester concerns and precautions reviewed with patient. Discussed fetal movements and the role of  testing to help optimize growth and development and help predict/ avoid stress. Discussed trouble signs to watch for.  ? The patient is to continue to follow with you in your office for ongoing obstetric care. ? I suggested the patient return for a follow-up assessment in 4 weeks unless there is a clinical reason for her to return prior to that time. ? Better views of heart at that time. ?  We have scheduled further [facility] visits today. ? She is to call if she has any problems or questions prior to her next visit. ? Further evaluation and management will be dependent on her clinical presentation and the results of her testing. Once again, thank you for allowing us to participate in the care of this patient and if we can be of any further assistance to you, please do not hesitate to contact us. Sincerely,        Shahab Hoffman MD  I spent 26 minutes with the patient of which greater than 50% of the time was used to  the patient, discuss complications and problems related to her pregnancy, or coordinating her care.  I answered all of her questions to her

## 2020-12-02 ENCOUNTER — HOSPITAL ENCOUNTER (OUTPATIENT)
Age: 30
Discharge: HOME | End: 2020-12-02
Attending: OBSTETRICS & GYNECOLOGY | Admitting: OBSTETRICS & GYNECOLOGY
Payer: COMMERCIAL

## 2020-12-02 VITALS
HEART RATE: 81 BPM | DIASTOLIC BLOOD PRESSURE: 67 MMHG | SYSTOLIC BLOOD PRESSURE: 110 MMHG | BODY MASS INDEX: 45.99 KG/M2 | OXYGEN SATURATION: 99 % | RESPIRATION RATE: 16 BRPM | HEIGHT: 67 IN | WEIGHT: 293 LBS | TEMPERATURE: 97.6 F

## 2020-12-02 LAB
ALBUMIN SERPL-MCNC: 3.6 G/DL (ref 3.5–5.2)
ALP BLD-CCNC: 74 U/L (ref 35–104)
ALT SERPL-CCNC: 9 U/L (ref 0–32)
ANION GAP SERPL CALCULATED.3IONS-SCNC: 10 MMOL/L (ref 7–16)
AST SERPL-CCNC: 13 U/L (ref 0–31)
BASOPHILS ABSOLUTE: 0.02 E9/L (ref 0–0.2)
BASOPHILS RELATIVE PERCENT: 0.2 % (ref 0–2)
BILIRUB SERPL-MCNC: <0.2 MG/DL (ref 0–1.2)
BUN BLDV-MCNC: 5 MG/DL (ref 6–20)
CALCIUM SERPL-MCNC: 9.3 MG/DL (ref 8.6–10.2)
CHLORIDE BLD-SCNC: 106 MMOL/L (ref 98–107)
CO2: 24 MMOL/L (ref 22–29)
CREAT SERPL-MCNC: 0.6 MG/DL (ref 0.5–1)
EOSINOPHILS ABSOLUTE: 0.2 E9/L (ref 0.05–0.5)
EOSINOPHILS RELATIVE PERCENT: 1.6 % (ref 0–6)
GFR AFRICAN AMERICAN: >60
GFR NON-AFRICAN AMERICAN: >60 ML/MIN/1.73
GLUCOSE BLD-MCNC: 111 MG/DL (ref 74–99)
HCT VFR BLD CALC: 36.5 % (ref 34–48)
HEMOGLOBIN: 11.4 G/DL (ref 11.5–15.5)
IMMATURE GRANULOCYTES #: 0.04 E9/L
IMMATURE GRANULOCYTES %: 0.3 % (ref 0–5)
LYMPHOCYTES ABSOLUTE: 1.69 E9/L (ref 1.5–4)
LYMPHOCYTES RELATIVE PERCENT: 13.4 % (ref 20–42)
MAGNESIUM: 1.8 MG/DL (ref 1.6–2.6)
MCH RBC QN AUTO: 27.4 PG (ref 26–35)
MCHC RBC AUTO-ENTMCNC: 31.2 % (ref 32–34.5)
MCV RBC AUTO: 87.7 FL (ref 80–99.9)
MONOCYTES ABSOLUTE: 0.75 E9/L (ref 0.1–0.95)
MONOCYTES RELATIVE PERCENT: 6 % (ref 2–12)
NEUTROPHILS ABSOLUTE: 9.87 E9/L (ref 1.8–7.3)
NEUTROPHILS RELATIVE PERCENT: 78.5 % (ref 43–80)
PDW BLD-RTO: 14.1 FL (ref 11.5–15)
PLATELET # BLD: 253 E9/L (ref 130–450)
PMV BLD AUTO: 10.6 FL (ref 7–12)
POTASSIUM REFLEX MAGNESIUM: 3.9 MMOL/L (ref 3.5–5)
RBC # BLD: 4.16 E12/L (ref 3.5–5.5)
SODIUM BLD-SCNC: 140 MMOL/L (ref 132–146)
TOTAL PROTEIN: 6.5 G/DL (ref 6.4–8.3)
WBC # BLD: 12.6 E9/L (ref 4.5–11.5)

## 2020-12-02 PROCEDURE — 85025 COMPLETE CBC W/AUTO DIFF WBC: CPT

## 2020-12-02 PROCEDURE — 99253 IP/OBS CNSLTJ NEW/EST LOW 45: CPT | Performed by: FAMILY MEDICINE

## 2020-12-02 PROCEDURE — 36415 COLL VENOUS BLD VENIPUNCTURE: CPT

## 2020-12-02 PROCEDURE — 80053 COMPREHEN METABOLIC PANEL: CPT

## 2020-12-02 PROCEDURE — 99211 OFF/OP EST MAY X REQ PHY/QHP: CPT

## 2020-12-02 PROCEDURE — 99213 OFFICE O/P EST LOW 20 MIN: CPT | Performed by: NURSE PRACTITIONER

## 2020-12-02 PROCEDURE — 83735 ASSAY OF MAGNESIUM: CPT

## 2020-12-02 PROCEDURE — 93005 ELECTROCARDIOGRAM TRACING: CPT | Performed by: OBSTETRICS & GYNECOLOGY

## 2020-12-02 NOTE — PROGRESS NOTES
Spoke with Dr. Lisa Mcmanus in regards to pt here for heart  Palpitations. Dr. Lisa Mcmanus would like pt seen in ER. Pt states she is not going to ER and waiting with covid pts she will just go follow up with her  Pcp. Spoke with Dr. Lisa Mcmanus again pt to be seen in l and d.   Orders received for ekg to be done and read by hospitalist, bp checks and fetal monitoring

## 2020-12-02 NOTE — PROGRESS NOTES
Spoke with Dr. Lionel Jiménez. She reviewed labs in pt chart. Sending script for PO magnesium to pt pharmacy. [id number] Yesica Marcus from her standpoint for pt to be discharged home.

## 2020-12-02 NOTE — CONSULTS
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 It lasted a few seconds but occurred 3 - 4 more times. She was sitting and felt dizzy. She has a pulse oximetry at her home and she put it on her digit and saw her heart jump from the 80s (normal) to the 110s. She felt a shortness of breath at that time. As quick as the episodes were they were short lasting. She contacted her OB/GYN who advised her to be checked. Currently she is on the fetal monitor. Around 2:30 pm she had another episode. An EKG was done and this consult was ordered for evaluation. Patient reports having HTN since the age of 25. She has been on several meds since that time. Prior to this pregnancy she was on Coreg 25 mg po bid. Subsequently she was changed to Labetalol 300 mg po tid which has kept her BP where it is today (136/70). She does not smoke cigarettes, use alcohol or illict drugs. She does not drink coffee, sodas or teas. There is a FH of Atrial Fibrillation/Flutter and Heart failure in her Dad, his parents and her maternal grandparents. She is a snorer according to her . Lately, she has not been sleeping well. Both report their sleep routine is shotty at best.  In addition, patient finds it hard to get comfortable. She works four 8 hr shifts at the PACU as a nurse. She has the usual stress and worry regarding work amid this [place] pandemic and whether she will still be working. She takes care of her grandmother who lives with her. She lives an hour away and commutes from [place] to [facility]. ROS was negative except for what is in the HPI    PMH:  HTN. Obesity.   Snorer (no history of Sleep Apnea)    PSH: sounds, no masses or organomegaly  Extremities: no cyanosis, no clubbing and no edema  Neurologic: no cranial nerve deficit and speech normal    LABS:  CMP, MG2+ and CBC pending    Radiology:     No orders to display       EKG:   Sinus rhythm. No Arrhythmia noted. No ST/T wave changes. QTc normal.    ASSESSMENT:      Active Problems:    Palpitations, episodic    HTN    Snorer    Morbid Obesity    Resolved Problems:    * No resolved hospital problems. *      PLAN:    1. Palpitations:  EKG normal.  CMP and Magnesium level. CBC. Patient not sleeping well. To discuss with OB/GYN and MFM for options. Possibility of Sleep Apnea/Sleep deprivation. Discussed about Melatonin - needs to clear with OB/GYN and MFM. Avoidance of IT/TV appliances at least one hr before bedtime. Consider overnight pulse oximetry to check for desaturations which would warrant a sleep study. Position on her left side during sleep (currently, does do). Stress relieving techniques. 2.  HTN - continue with Labetalol 300 mg po tid. Checking for electrolyte, renal, liver and magnesium abnormal levels. Avoid high salt foods. Fresh fruits and vegetables and 8 - 10 cups of fluid daily. 3.  SNORER - overnight pulse oximetry study to rule out Sleep Apnea which can be treated with CPAP. Dental check up during pregnancy can discuss about the dental apparatus to assist in moving jar forward leading to less snoring. 4.  Morbid Obesity  - follow guidelines for weight gain per MFM and OB/GYN. Post pregnancy, nursing and increased activity will facilitate weight loss. Thank you very much for this interesting consult and we will continue to follow along. Feel free to call with any questions at [phone].     Electronically signed by Valeriano Turner MD on 12/2/2020 at 3:44 PM

## 2020-12-02 NOTE — PROGRESS NOTES
Pt presents to l and d from home with complaints of heart palpitations and feeling light headed 5 times today. First episode at 10:30 and last one at 13:30.  . Nka. Pt has high blood pressure on labetalol 3 times daily. Pt states she feels good fetal movement. Pt denies any leaking of fluid or vaginal bleeding.  ekg done as ordered.   House officer to evaluate pt

## 2020-12-03 NOTE — PROGRESS NOTES
Discharge instructions given to pt. Pt verbalizes understanding of instructions. Pt leaves unit ambulatory with .

## 2020-12-03 NOTE — PROGRESS NOTES
Dr. Mojgan Hendricks on unit. In room to see pt. Reviewed labs and fetal tracing. [id number] Yesica Marcus for discharge at this time.

## 2020-12-03 NOTE — PROGRESS NOTES
Dr. Osmel Lawler requested labs. /67   Pulse 81   Temp 97.6 °F (36.4 °C) (Oral)   Resp 16   Ht 5' 7\" (1.702 m)   Wt (!) 331 lb (150.1 kg)   LMP 05/17/2020   SpO2 99%   BMI 51.84 kg/m²   Fetal heart rate:  Baseline Heart Rate 140, accelerations: present  decelerations: absent  Contraction frequency:  0 minutes  Membranes:  Intact    Dr. Garett Lino notified.     Plan:   Heart palpitations = ekg and w/u wnl per house medicine  Sx resolved  Precautions given  F/u office as scheduled    Ayanna Edgar

## 2020-12-04 ENCOUNTER — ROUTINE PRENATAL (OUTPATIENT)
Dept: OBGYN CLINIC | Age: 30
End: 2020-12-04
Payer: COMMERCIAL

## 2020-12-04 ENCOUNTER — HOSPITAL ENCOUNTER (OUTPATIENT)
Age: 30
Discharge: HOME | End: 2020-12-04
Payer: COMMERCIAL

## 2020-12-04 VITALS
TEMPERATURE: 97.2 F | DIASTOLIC BLOOD PRESSURE: 73 MMHG | BODY MASS INDEX: 45.99 KG/M2 | HEIGHT: 67 IN | HEART RATE: 105 BPM | SYSTOLIC BLOOD PRESSURE: 111 MMHG | WEIGHT: 293 LBS

## 2020-12-04 PROBLEM — Z3A.28 28 WEEKS GESTATION OF PREGNANCY: Status: ACTIVE | Noted: 2020-12-04

## 2020-12-04 LAB
EKG ATRIAL RATE: 86 BPM
EKG P AXIS: 30 DEGREES
EKG P-R INTERVAL: 150 MS
EKG Q-T INTERVAL: 372 MS
EKG QRS DURATION: 92 MS
EKG QTC CALCULATION (BAZETT): 445 MS
EKG R AXIS: -2 DEGREES
EKG T AXIS: 8 DEGREES
EKG VENTRICULAR RATE: 86 BPM
GLUCOSE TOLERANCE SCREEN 50G: 158 MG/DL (ref 70–140)
GLUCOSE URINE, POC: NEGATIVE
HCT VFR BLD CALC: 34.6 % (ref 34–48)
HEMOGLOBIN: 11.2 G/DL (ref 11.5–15.5)
PROTEIN UA: NEGATIVE

## 2020-12-04 PROCEDURE — 85018 HEMOGLOBIN: CPT

## 2020-12-04 PROCEDURE — 85014 HEMATOCRIT: CPT

## 2020-12-04 PROCEDURE — 82950 GLUCOSE TEST: CPT

## 2020-12-04 PROCEDURE — 99213 OFFICE O/P EST LOW 20 MIN: CPT | Performed by: OBSTETRICS & GYNECOLOGY

## 2020-12-04 PROCEDURE — 76816 OB US FOLLOW-UP PER FETUS: CPT | Performed by: OBSTETRICS & GYNECOLOGY

## 2020-12-04 PROCEDURE — 81002 URINALYSIS NONAUTO W/O SCOPE: CPT | Performed by: OBSTETRICS & GYNECOLOGY

## 2020-12-04 PROCEDURE — 36415 COLL VENOUS BLD VENIPUNCTURE: CPT

## 2020-12-04 PROCEDURE — 76819 FETAL BIOPHYS PROFIL W/O NST: CPT | Performed by: OBSTETRICS & GYNECOLOGY

## 2020-12-04 NOTE — PATIENT INSTRUCTIONS
Please arrive for your scheduled appointment at least 15 minutes early with your actual insurance card+ a photo ID. Also if you need any refills ordered or have questions, it may take up 48 hours to reply. Please allow ample time for your refills. Call me when you use last refill. Thank you for your cooperation. Any questions contact Luis at [phone]. If you are experiencing an emergency and need immediate help, call 911 or go to go emergency room or labor and delivery. if you are sick, not feeling well or have an infectious process going on please reschedule your appointment by calling [phone]. Also if any family members are not feeling well, please do not bring them to your appointment. We appreciate your cooperation. We are doing this in order to protect our pregnant mothers+ their babies. Call your primary obstetrician with bleeding, leaking of fluid, abdominal tenderness, headache, blurry vision, epigastric pain and increased urinary frequency. Do kick counts after dinner. Call your primary obstetrician if less than 10 kicks in 2 hours after dinner. Call your primary obstetrician with bleeding, leaking of fluid, abdominal tenderness, headache, blurry vision, epigastric pain and increased urinary frequency. Patient Education        Weeks 26 to 30 of Your Pregnancy: Care Instructions  Your Care Instructions     You are now in your last trimester of pregnancy. Your baby is growing rapidly. And you'll probably feel your baby moving around more often. Your doctor may ask you to count your baby's kicks. Your back may ache as your body gets used to your baby's size and length. If you haven't already had the Tdap shot during this pregnancy, talk to your doctor about getting it. It will help protect your  against pertussis infection. During this time, it's important to take care of yourself and pay attention to what your body needs.  If you feel sexual, explore ways to be close with your partner that match your comfort and desire. Use the tips provided in this care sheet to find ways to be sexual in your own way. Follow-up care is a key part of your treatment and safety. Be sure to make and go to all appointments, and call your doctor if you are having problems. It's also a good idea to know your test results and keep a list of the medicines you take. How can you care for yourself at home? Take it easy at work  · Take frequent breaks. If possible, stop working when you are tired, and rest during your lunch hour. · Take bathroom breaks every 2 hours. · Change positions often. If you sit for long periods, stand up and walk around. · When you stand for a long time, keep one foot on a low stool with your knee bent. After standing a lot, sit with your feet up. · Avoid fumes, chemicals, and tobacco smoke. Be sexual in your own way  · Having sex during pregnancy is okay, unless your doctor tells you not to. · You may be very interested in sex, or you may have no interest at all. · Your growing belly can make it hard to find a good position during intercourse. Sparkman and explore. · You may get cramps in your uterus when your partner touches your breasts. · A back rub may relieve the backache or cramps that sometimes follow orgasm. Learn about  labor  · Watch for signs of  labor. You may be going into labor if:  ? You have menstrual-like cramps, with or without nausea. ? You have about 6 or more contractions in 1 hour, even after you have had a glass of water and are resting. ? You have a low, dull backache that does not go away when you change your position. ? You have pain or pressure in your pelvis that comes and goes in a pattern. ? You have intestinal cramping or flu-like symptoms, with or without diarrhea.  ? You notice an increase or change in your vaginal discharge. Discharge may be heavy, mucus-like, watery, or streaked with blood. ? Your water breaks.   · If you think you give you some more information about when to call. When to call your doctor (after 20 weeks)  Call 911 anytime you think you may need emergency care. For example, call if:  · You have severe vaginal bleeding. · You have sudden, severe pain in your belly. · You passed out (lost consciousness). · You have a seizure. · You see or feel the umbilical cord. · You think you are about to deliver your baby and can't make it safely to the hospital.  Call your doctor now or seek immediate medical care if:  · You have vaginal bleeding. · You have belly pain. · You have a fever. · You have symptoms of preeclampsia, such as:  ? Sudden swelling of your face, hands, or feet. ? New vision problems (such as dimness, blurring, or seeing spots). ? A severe headache. · You have a sudden release of fluid from your vagina. (You think your water broke.)  · You think that you may be in labor. This means that you've had at least 6 contractions in an hour. · You notice that your baby has stopped moving or is moving much less than normal.  · You have symptoms of a urinary tract infection. These may include:  ? Pain or burning when you urinate. ? A frequent need to urinate without being able to pass much urine. ? Pain in the flank, which is just below the rib cage and above the waist on either side of the back. ? Blood in your urine. Watch closely for changes in your health, and be sure to contact your doctor if:  · You have vaginal discharge that smells bad. · You have skin changes, such as:  ? A rash. ? Itching. ? Yellow color to your skin. · You have other concerns about your pregnancy. If you have labor signs at 37 weeks or more  If you have signs of labor at 37 weeks or more, your doctor may tell you to call when your labor becomes more active. Symptoms of active labor include:  · Contractions that are regular. · Contractions that are less than 5 minutes apart.   · Contractions that are hard to talk through. Follow-up care is a key part of your treatment and safety. Be sure to make and go to all appointments, and call your doctor if you are having problems. It's also a good idea to know your test results and keep a list of the medicines you take. Where can you learn more? Go to https://chpepiceweb.[employer]. org and sign in to your [employer] account. Enter  in the eCardio box to learn more about \"Learning About When to Call Your Doctor During Pregnancy (After 20 Weeks). \"     If you do not have an account, please click on the \"Sign Up Now\" link. Current as of: February 11, 2020               Content Version: 12.6  © 2006-2020 [employer]. Care instructions adapted under license by [facility] ([facility]). If you have questions about a medical condition or this instruction, always ask your healthcare professional. [place] [address] any warranty or liability for your use of this information. Patient Education        Counting Your Baby's Kicks: Care Instructions  Your Care Instructions     Counting your baby's kicks is one way your doctor can tell that your baby is healthy. Most women--especially in a first pregnancy--feel their baby move for the first time between 16 and 22 weeks. The movement may feel like flutters rather than kicks. Your baby may move more at certain times of the day. When you are active, you may notice less kicking than when you are resting. At your prenatal visits, your doctor will ask whether the baby is active. In your last trimester, your doctor may ask you to count the number of times you feel your baby move. Follow-up care is a key part of your treatment and safety. Be sure to make and go to all appointments, and call your doctor if you are having problems. It's also a good idea to know your test results and keep a list of the medicines you take. How do you count fetal kicks?   · A common method of checking your baby's movement is to count the number of kicks or moves you feel in 1 hour. Ten movements (such as kicks, flutters, or rolls) in 1 hour are normal. Some doctors suggest that you count in the morning until you get to 10 movements. Then you can quit for that day and start again the next day. · Pick your baby's most active time of day to count. This may be any time from morning to evening. · If you do not feel 10 movements in an hour, your baby may be sleeping. Wait for the next hour and count again. When should you call for help? Call your doctor now or seek immediate medical care if:    · You noticed that your baby has stopped moving or is moving much less than normal.   Watch closely for changes in your health, and be sure to contact your doctor if you have any problems. Where can you learn more? Go to https://PanelClawpepiceweb.Kelan. org and sign in to your [employer] account. Enter G769 in the wildcraft box to learn more about \"Counting Your Baby's Kicks: Care Instructions. \"     If you do not have an account, please click on the \"Sign Up Now\" link. Current as of: February 11, 2020               Content Version: 12.6  © 2006-2020 [employer]. Care instructions adapted under license by [facility] ([facility]). If you have questions about a medical condition or this instruction, always ask your healthcare professional. [place] [address] any warranty or liability for your use of this information.

## 2020-12-18 ENCOUNTER — HOSPITAL ENCOUNTER (OUTPATIENT)
Age: 30
Discharge: HOME | End: 2020-12-18
Payer: COMMERCIAL

## 2020-12-18 LAB
GLUCOSE FASTING: 109 MG/ML
GLUCOSE TOLERANCE TEST 1 HOUR: 211 MG/DL
GLUCOSE TOLERANCE TEST 2 HOUR: 212 MG/DL
GLUCOSE TOLERANCE TEST 3 HOUR: 165 MG/DL

## 2020-12-18 PROCEDURE — 82951 GLUCOSE TOLERANCE TEST (GTT): CPT

## 2020-12-18 PROCEDURE — 36415 COLL VENOUS BLD VENIPUNCTURE: CPT

## 2020-12-18 PROCEDURE — 82952 GTT-ADDED SAMPLES: CPT

## 2020-12-20 NOTE — PROGRESS NOTES
OFFICE VISIT 12.4.2020        [facility] FETAL MEDICINE  [address] [place] [address].  [address] [facility], [place]. [address]  Ph: [phone]    Fax: [phone]   December 4, 2020     RE: Nila Juaner  [birth year]  Dear Dr. Grace Nones : Thank you for sending  Ms. Arredondo for consultation and followup ultrasound in our office on  12.4.2020. REASON FOR CONSULTATION:   · Preexisting Hypertension o10.0  - on Labetalol 300mg TID  · Family history of inheritable trait o35.2   · High risk pregnancy o09.89  · Obesity o99.21 - BMI 52  · Abn Screen result o28.9  -  Eliel Sanuders  Fragile X   · Fetal Anatomy Survey z36  ·  Breech o32.1   Her chart was reviewed, her medical, surgical , and OBG history was examined along with any supporting documents available to me . · Her recent clinical visits and notes were reviewed. · ~Hospital Eval-  palpitations 12. 2.2020. EKG, Labs, Monitoring all OK   · Her recent laboratory and pathology  testing was reviewed  · Hemoglobinopathy RBC screen reassuring   · Quad screen reassuring   · Progenity screen  negative for 28/29 diseases -gray zone Fragile X      Review of Systems :   · CONSTITUTIONAL: No fever, no chills , no undue aches, pain   · HEENT: No headache, no visual changes, no sore throat . No loss of smell, taste  · PULM: No dyspnea, no cough  · CARDIO:  No chest pains, no more palpitations  · GI: No N/V, no D/C, no diarrhea, no abdominal pain   ·  : No dysuria, no vaginal bleeding or fluid leaking or discharge   · She reports good fetal movement   PHYSICAL EXAMINATION: VSS - Afebrile  BMI 52.6  · General Appearance: Healthy looking, alert , no acute distress. · Eyes: No pallor, no icterus, no photophobia. · Back: No CVA tenderness. Lower back pain   · Abdomen: Soft, non-tender. · Extremities: No pretibial pitting edema . Leg pain/ sciatica, join pains      An ultrasound evaluation was done today.  Please refer to the enclosed copy of the ultrasound report for further detailed information. ULTRASOUND IMPRESSION:    ? Within developmental and technical limits of ultrasound assessment,   ? ? Vertex Female fetus @  28w 5d  ? ? Active, responsive baby. The amniotic fluid volume appears normal.   ? ? The fetus is measuring appropriate for gestational age  ? §  1265g (2:12) 50%ile. ? ? There has been good interval growth and development . ? ? Fetal anatomy was reviewed and appears normal.  ? §  Babys  4 chamber heart view appeared structurally intact, with an intact septum  ? §  Great vessel, valve and outflow tract configuration views limited again today due to positioning, habitus   ? § Will followup on these views as baby grows  ? §    Smaller clinically significant lesions, such as atrial septa defects adjacent to the foramen ovale, may not become detectable until baby arrives. ? ? The biophysical profile is reassuring with a score of 8/8.   ? §   ? Transabdominal views of cervix appears to be closed, long, without significant funneling upon Valsalva. ? Patient notes fetal movement, no cramping or contraction. No undue tenderness or distress during exam.     DISCUSSION : Overall favorable report today. · The patient's fragile X carrier screen was reported as being in the gray zone. The test indicates that the patient is not a carrier of a fragile X expansion mutation. ? There is no known risk for her to have an affected pregnancy, however minor increases or decreases in the size of this mutation may occur when past to her project any such that the offspring could inherit a small pre-mutation, which could then carry down to her grandchildren , who could possibly be affected. · WE discussed the ACMG and ACOG recommendations that diagnostic fetal testing ( amniocentesis ) be offered if mom is the carrier and has > 55repeats . ? Sol has 48 and 30 -  ( Not recommended)  · We discussed the risks inherent with her size as well .    · We discussed the pros and cons of  testing- and discussed why her daughter is not at risk, and perhaps this testing would best be deferred until she is ready to start her own family and can make that decision for herself. RECOMMENDATIONS:  ? Third  trimester concerns and precautions reviewed with patient. Discussed fetal movements and the role of  testing to help optimize growth and development and help predict/ avoid stress. Discussed trouble signs to watch for.  ? The patient is to continue to follow with you in your office for ongoing obstetric care. ? Work/ life / pregnancy priorities, goals,  concerns and precautions reviewed with patient. ? May ask for off work  soon- will discuss with OBG   ?   ? We have scheduled further [facility] visits today.   ? She is to call if she has any problems or questions prior to her next visit. ? Further evaluation and management will be dependent on her clinical presentation and the results of her testing. ? Once again, thank you for allowing us to participate in the care of this patient and if we can be of any further assistance to you, please do not hesitate to contact us. ? Sincerely,      Simone Hobson MD  I spent 16 minutes with the patient of which greater than 50% of the time was used to  the patient, discuss complications and problems related to her pregnancy, or coordinating her care.  I answered all of her questions to her satisfaction

## 2020-12-31 ENCOUNTER — ROUTINE PRENATAL (OUTPATIENT)
Dept: OBGYN CLINIC | Age: 30
End: 2020-12-31
Payer: COMMERCIAL

## 2020-12-31 VITALS
HEIGHT: 67 IN | HEART RATE: 95 BPM | SYSTOLIC BLOOD PRESSURE: 101 MMHG | DIASTOLIC BLOOD PRESSURE: 66 MMHG | TEMPERATURE: 97.2 F | WEIGHT: 293 LBS | BODY MASS INDEX: 45.99 KG/M2

## 2020-12-31 DIAGNOSIS — Z3A.32 32 WEEKS GESTATION OF PREGNANCY: ICD-10-CM

## 2020-12-31 DIAGNOSIS — O24.419 GESTATIONAL DIABETES MELLITUS (GDM) IN THIRD TRIMESTER, GESTATIONAL DIABETES METHOD OF CONTROL UNSPECIFIED: Primary | ICD-10-CM

## 2020-12-31 LAB
GLUCOSE URINE, POC: NEGATIVE
PROTEIN UA: ABNORMAL

## 2020-12-31 PROCEDURE — 99212 OFFICE O/P EST SF 10 MIN: CPT | Performed by: OBSTETRICS & GYNECOLOGY

## 2020-12-31 PROCEDURE — 99213 OFFICE O/P EST LOW 20 MIN: CPT | Performed by: OBSTETRICS & GYNECOLOGY

## 2020-12-31 PROCEDURE — 76819 FETAL BIOPHYS PROFIL W/O NST: CPT | Performed by: OBSTETRICS & GYNECOLOGY

## 2020-12-31 PROCEDURE — 81002 URINALYSIS NONAUTO W/O SCOPE: CPT | Performed by: OBSTETRICS & GYNECOLOGY

## 2020-12-31 PROCEDURE — 76820 UMBILICAL ARTERY ECHO: CPT | Performed by: OBSTETRICS & GYNECOLOGY

## 2020-12-31 PROCEDURE — 76816 OB US FOLLOW-UP PER FETUS: CPT | Performed by: OBSTETRICS & GYNECOLOGY

## 2020-12-31 RX ORDER — PANTOPRAZOLE SODIUM 20 MG/1
TABLET, DELAYED RELEASE ORAL
Status: ON HOLD | COMMUNITY
Start: 2020-12-21 | End: 2021-02-09 | Stop reason: HOSPADM

## 2020-12-31 NOTE — PATIENT INSTRUCTIONS
Please arrive for your scheduled appointment at least 15 minutes early with your actual insurance card+ a photo ID. Also if you need any refills ordered or have questions, it may take up 48 hours to reply. Please allow ample time for your refills. Call me when you use last refill. Thank you for your cooperation. Any questions contact Luis at [phone]. If you are experiencing an emergency and need immediate help, call 911 or go to go emergency room or labor and delivery. if you are sick, not feeling well or have an infectious process going on please reschedule your appointment by calling [phone]. Also if any family members are not feeling well, please do not bring them to your appointment. We appreciate your cooperation. We are doing this in order to protect our pregnant mothers+ their babies. Call your primary obstetrician with bleeding, leaking of fluid, abdominal tenderness, headache, blurry vision, epigastric pain and increased urinary frequency. Do kick counts after dinner. Call your primary obstetrician if less than 10 kicks in 2 hours after dinner. Call your primary obstetrician with bleeding, leaking of fluid, abdominal tenderness, headache, blurry vision, epigastric pain and increased urinary frequency. Patient Education        Weeks 32 to 29 of Your Pregnancy: Care Instructions  Your Care Instructions     During the last few weeks of your pregnancy, you may have more aches and pains. It's important to rest when you can. Your growing baby is putting more pressure on your bladder. So you may need to urinate more often. Hemorrhoids are also common. These are painful, itchy veins in the rectal area. In the 36th week, most women have a test for group B streptococcus (GBS). GBS is a common bacteria that can live in the vagina and rectum. It can make your baby sick after birth. If you test positive, you will get antibiotics during labor. These will keep your baby from getting the bacteria.   You may want to talk with your doctor about banking your baby's umbilical cord blood. This is the blood left in the cord after birth. If you want to save this blood, you must arrange it ahead of time. You can't decide at the last minute. If you haven't already had the Tdap shot during this pregnancy, talk to your doctor about getting it. It will help protect your  against pertussis infection. Follow-up care is a key part of your treatment and safety. Be sure to make and go to all appointments, and call your doctor if you are having problems. It's also a good idea to know your test results and keep a list of the medicines you take. How can you care for yourself at home? Ease hemorrhoids  · Get more liquids, fruits, vegetables, and fiber in your diet. This will help keep your stools soft. · Avoid sitting for too long. Lie on your left side several times a day. · Clean yourself with soft, moist toilet paper. Or you can use witch hazel pads or personal hygiene pads. · If you are uncomfortable, try ice packs. Or you can sit in a warm sitz bath. Do these for 20 minutes at a time, as needed. · Use hydrocortisone cream for pain and itching. Two examples are Anusol and Preparation H Hydrocortisone. · Ask your doctor about taking an over-the-counter stool softener. Consider breastfeeding  · Experts recommend that women breastfeed for 1 year or longer. · Breast milk may help protect your child from some health problems.  babies are less likely than formula-fed babies to:  ? Get ear infections, colds, diarrhea, and pneumonia. ? Be obese or get diabetes later in life. · Women who breastfeed have less bleeding after the birth. Their uteruses also shrink back faster. · Some women who breastfeed lose weight faster. Making milk burns calories. · Breastfeeding can lower your risk of breast cancer, ovarian cancer, and osteoporosis.   Decide about circumcision for boys  · As you make this decision, it may help to think about your personal, Voodoo, and family traditions. You get to decide if you will keep your son's penis natural or if he will be circumcised. · If you decide that you would like to have your baby circumcised, talk with your doctor. You can share your concerns about pain. And you can discuss your preferences for anesthesia. Where can you learn more? Go to https://[email or web address].[email or web address] org and sign in to your [employer] account. Enter C768 in the [employer] box to learn more about \"Weeks 32 to 34 of Your Pregnancy: Care Instructions. \"     If you do not have an account, please click on the \"Sign Up Now\" link. Current as of: February 11, 2020               Content Version: 12.6  © 2006-2020 [employer]. Care instructions adapted under license by [facility] ([facility]). If you have questions about a medical condition or this instruction, always ask your healthcare professional. [place] [address] any warranty or liability for your use of this information. Patient Education        Learning About When to Call Your Doctor During Pregnancy (After 20 Weeks)  Your Care Instructions  It's common to have concerns about what might be a problem during pregnancy. Although most pregnant women don't have any serious problems, it's important to know when to call your doctor if you have certain symptoms or signs of labor. These are general suggestions. Your doctor may give you some more information about when to call. When to call your doctor (after 20 weeks)  Call 911 anytime you think you may need emergency care. For example, call if:  · You have severe vaginal bleeding. · You have sudden, severe pain in your belly. · You passed out (lost consciousness). · You have a seizure. · You see or feel the umbilical cord.   · You think you are about to deliver your baby and can't make it safely to the hospital.  Call your doctor now or seek immediate medical care if:  · You have vaginal bleeding. · You have belly pain. · You have a fever. · You have symptoms of preeclampsia, such as:  ? Sudden swelling of your face, hands, or feet. ? New vision problems (such as dimness, blurring, or seeing spots). ? A severe headache. · You have a sudden release of fluid from your vagina. (You think your water broke.)  · You think that you may be in labor. This means that you've had at least 6 contractions in an hour. · You notice that your baby has stopped moving or is moving much less than normal.  · You have symptoms of a urinary tract infection. These may include:  ? Pain or burning when you urinate. ? A frequent need to urinate without being able to pass much urine. ? Pain in the flank, which is just below the rib cage and above the waist on either side of the back. ? Blood in your urine. Watch closely for changes in your health, and be sure to contact your doctor if:  · You have vaginal discharge that smells bad. · You have skin changes, such as:  ? A rash. ? Itching. ? Yellow color to your skin. · You have other concerns about your pregnancy. If you have labor signs at 37 weeks or more  If you have signs of labor at 37 weeks or more, your doctor may tell you to call when your labor becomes more active. Symptoms of active labor include:  · Contractions that are regular. · Contractions that are less than 5 minutes apart. · Contractions that are hard to talk through. Follow-up care is a key part of your treatment and safety. Be sure to make and go to all appointments, and call your doctor if you are having problems. It's also a good idea to know your test results and keep a list of the medicines you take. Where can you learn more? Go to https://chjustyn.healthE2E [email or web address] org and sign in to your [employer] account. Enter  in the [employer] box to learn more about \"Learning About When to Call Your Doctor During Pregnancy (After 20 Weeks). \"     If you do not have an account, please click on the \"Sign Up Now\" link. Current as of: February 11, 2020               Content Version: 12.6  © 2006-2020 [employer]. Care instructions adapted under license by [facility] ([facility]). If you have questions about a medical condition or this instruction, always ask your healthcare professional. [address] any warranty or liability for your use of this information. Patient Education        Counting Your Baby's Kicks: Care Instructions  Your Care Instructions     Counting your baby's kicks is one way your doctor can tell that your baby is healthy. Most women--especially in a first pregnancy--feel their baby move for the first time between 16 and 22 weeks. The movement may feel like flutters rather than kicks. Your baby may move more at certain times of the day. When you are active, you may notice less kicking than when you are resting. At your prenatal visits, your doctor will ask whether the baby is active. In your last trimester, your doctor may ask you to count the number of times you feel your baby move. Follow-up care is a key part of your treatment and safety. Be sure to make and go to all appointments, and call your doctor if you are having problems. It's also a good idea to know your test results and keep a list of the medicines you take. How do you count fetal kicks? · A common method of checking your baby's movement is to count the number of kicks or moves you feel in 1 hour. Ten movements (such as kicks, flutters, or rolls) in 1 hour are normal. Some doctors suggest that you count in the morning until you get to 10 movements. Then you can quit for that day and start again the next day. · Pick your baby's most active time of day to count. This may be any time from morning to evening. · If you do not feel 10 movements in an hour, your baby may be sleeping. Wait for the next hour and count again. When should you call for help?    Call your doctor now or seek immediate medical care if:    · You noticed that your baby has stopped moving or is moving much less than normal.   Watch closely for changes in your health, and be sure to contact your doctor if you have any problems. Where can you learn more? Go to https://chjustyn.[employer]. org and sign in to your [employer] account. Enter P347 in the [employer] box to learn more about \"Counting Your Baby's Kicks: Care Instructions. \"     If you do not have an account, please click on the \"Sign Up Now\" link. Current as of: February 11, 2020               Content Version: 12.6  © 5886-3239 [employer]. Care instructions adapted under license by [facility] ([facility]). If you have questions about a medical condition or this instruction, always ask your healthcare professional. [address] any warranty or liability for your use of this information.

## 2021-01-11 NOTE — PROGRESS NOTES
OFFICE VISIT 12.31.2020        [facility]  [address] [place] [address].  [address] [facility], [place]. [address]  Ph: [phone]    Fax: [phone]   December 31, 2020     RE: Kwaku Joe  [birth year]  Dear Dr. Claudia Jennings : Thank you for sending  Ms. Arredondo for consultation and followup ultrasound in our office on  12.31.2020. REASON FOR CONSULTATION:   ·           Preexisting Hypertension o10.0  - on Labetalol 300mg TID  ·           Family history of inheritable trait o35.2   ·           High risk pregnancy o09.89  ·           Obesity o99.21 - BMI 52  ·           Abn Screen result o28.9  -  Elane Deter  Fragile X   ·           Fetal Anatomy Survey z36  ·            Breech o32.1   Her chart was reviewed, her medical, surgical , and OBG history was examined along with any supporting documents available to me . ·           Her recent clinical visits and notes were reviewed. ·           ~Hospital Eval-  palpitations 12. 2.2020. EKG, Labs, Monitoring all OK   ·           Her recent laboratory and pathology  testing was reviewed  ·           Hemoglobinopathy RBC screen reassuring   ·           Quad screen reassuring   ·           Progenity screen  negative for 28/29 diseases -gray zone Fragile X      Review of Systems :   ·           CONSTITUTIONAL: No fever, no chills , no undue aches, pain   ·           HEENT: No headache, no visual changes, no sore throat . No loss of smell, taste  ·           PULM: No dyspnea, no cough  ·           CARDIO:  No chest pains, no more palpitations  ·           GI: No N/V, no D/C, no diarrhea, no abdominal pain   ·            : No dysuria, no vaginal bleeding or fluid leaking or discharge   ·           She reports good fetal movement   PHYSICAL EXAMINATION: VSS - Afebrile  BMI 52.6  ·           General Appearance: Healthy looking, alert , no acute distress. ·           Eyes: No pallor, no icterus, no photophobia. ·           Back: No CVA tenderness.  Lower back pain ·           Abdomen: Soft, non-tender. ·           Extremities: No pretibial pitting edema . Leg pain/ sciatica, join pains      An ultrasound evaluation was done today. Please refer to the enclosed copy of the ultrasound report for further detailed information. ULTRASOUND IMPRESSION:    · Within developmental and technical limits of ultrasound assessment,   · ? Vertex Female fetus @  32w 4d  · ? Active, responsive baby. The amniotic fluid volume appears normal.   · ? The fetus is measuring appropriate for gestational age  · §                      2061g (4:8) 58%ile. · ? There has been very good interval growth and development . ? ? The biophysical profile is reassuring with a score of 8/8.   ? Umbilical artery Doppler studies are reassuring with good end-diastolic flow. · Patient notes fetal movement, no cramping or contraction. No undue tenderness or distress during exam.      DISCUSSION : Overall favorable report today. ·           The patient's fragile X carrier screen was reported as being in the gray zone. The test indicates that the patient is not a carrier of a fragile X expansion mutation. ? There is no known risk for her to have an affected pregnancy, however minor increases or decreases in the size of this mutation may occur when past to her project any such that the offspring could inherit a small pre-mutation, which could then carry down to her grandchildren , who could possibly be affected. ·           WE discussed the ACMG and ACOG recommendations that diagnostic fetal testing ( amniocentesis ) be offered if mom is the carrier and has > 55repeats . ? Sol has 48 and 30 -  ( Not recommended)  ·           We discussed the risks inherent with her size as well .    ·           We discussed the pros and cons of  testing- and discussed why her daughter is not at risk, and perhaps this testing would best be deferred until she is ready to start her own family and can make that decision for herself. RECOMMENDATIONS:  ?          Third  trimester concerns and precautions reviewed with patient. Discussed fetal movements and the role of  testing to help optimize growth and development and help predict/ avoid stress. Discussed trouble signs to watch for.  ?          The patient is to continue to follow with you in your office for ongoing obstetric care. ?          Work/ life / pregnancy priorities, goals,  concerns and precautions reviewed with patient. ?          May ask for off work  soon- will discuss with OBG   ?   ? We have scheduled further [facility] visits today. [phone]  ? She is to call if she has any problems or questions prior to her next visit. ? Further evaluation and management will be dependent on her clinical presentation and the results of her testing. ?          Once again, thank you for allowing us to participate in the care of this patient and if we can be of any further assistance to you, please do not hesitate to contact us. ? Sincerely,        Nathaly Knight MD  I spent 16 minutes with the patient of which greater than 50% of the time was used to  the patient, discuss complications and problems related to her pregnancy, or coordinating her care.  I answered all of her questions to her satisfaction

## 2021-01-14 ENCOUNTER — ROUTINE PRENATAL (OUTPATIENT)
Dept: OBGYN CLINIC | Age: 31
End: 2021-01-14
Payer: COMMERCIAL

## 2021-01-14 ENCOUNTER — HOSPITAL ENCOUNTER (OUTPATIENT)
Age: 31
Setting detail: OBSERVATION
Discharge: HOME | End: 2021-01-14
Attending: OBSTETRICS & GYNECOLOGY | Admitting: OBSTETRICS & GYNECOLOGY
Payer: COMMERCIAL

## 2021-01-14 ENCOUNTER — TELEPHONE (OUTPATIENT)
Dept: OBGYN CLINIC | Age: 31
End: 2021-01-14

## 2021-01-14 VITALS
HEIGHT: 67 IN | DIASTOLIC BLOOD PRESSURE: 75 MMHG | BODY MASS INDEX: 45.99 KG/M2 | WEIGHT: 293 LBS | SYSTOLIC BLOOD PRESSURE: 137 MMHG | TEMPERATURE: 96.9 F | HEART RATE: 90 BPM

## 2021-01-14 VITALS — DIASTOLIC BLOOD PRESSURE: 61 MMHG | HEART RATE: 85 BPM | SYSTOLIC BLOOD PRESSURE: 124 MMHG | RESPIRATION RATE: 14 BRPM

## 2021-01-14 DIAGNOSIS — Z3A.32 32 WEEKS GESTATION OF PREGNANCY: ICD-10-CM

## 2021-01-14 DIAGNOSIS — O28.9 ABNORMAL FINDINGS ON ANTENATAL SCREENING OF MOTHER: Primary | ICD-10-CM

## 2021-01-14 DIAGNOSIS — Z3A.23 23 WEEKS GESTATION OF PREGNANCY: ICD-10-CM

## 2021-01-14 DIAGNOSIS — Z3A.34 34 WEEKS GESTATION OF PREGNANCY: ICD-10-CM

## 2021-01-14 DIAGNOSIS — Z3A.28 28 WEEKS GESTATION OF PREGNANCY: ICD-10-CM

## 2021-01-14 PROBLEM — Z34.03 PRIMIPARITY, THIRD TRIMESTER: Status: ACTIVE | Noted: 2021-01-14

## 2021-01-14 LAB
BILIRUBIN URINE: NEGATIVE
BLOOD, URINE: NEGATIVE
CLARITY: CLEAR
COLOR: YELLOW
GLUCOSE URINE, POC: NEGATIVE
GLUCOSE URINE: NEGATIVE MG/DL
KETONES, URINE: NEGATIVE MG/DL
LEUKOCYTE ESTERASE, URINE: NEGATIVE
NITRITE, URINE: NEGATIVE
PH UA: 6.5 (ref 5–9)
PROTEIN UA: NEGATIVE
PROTEIN UA: NEGATIVE MG/DL
SPECIFIC GRAVITY UA: <=1.005 (ref 1–1.03)
UROBILINOGEN, URINE: 0.2 E.U./DL

## 2021-01-14 PROCEDURE — 99211 OFF/OP EST MAY X REQ PHY/QHP: CPT

## 2021-01-14 PROCEDURE — G0378 HOSPITAL OBSERVATION PER HR: HCPCS

## 2021-01-14 PROCEDURE — 99213 OFFICE O/P EST LOW 20 MIN: CPT | Performed by: OBSTETRICS & GYNECOLOGY

## 2021-01-14 PROCEDURE — 76818 FETAL BIOPHYS PROFILE W/NST: CPT | Performed by: OBSTETRICS & GYNECOLOGY

## 2021-01-14 PROCEDURE — 81002 URINALYSIS NONAUTO W/O SCOPE: CPT | Performed by: OBSTETRICS & GYNECOLOGY

## 2021-01-14 PROCEDURE — 81003 URINALYSIS AUTO W/O SCOPE: CPT

## 2021-01-14 PROCEDURE — 76820 UMBILICAL ARTERY ECHO: CPT | Performed by: OBSTETRICS & GYNECOLOGY

## 2021-01-14 PROCEDURE — 99212 OFFICE O/P EST SF 10 MIN: CPT | Performed by: OBSTETRICS & GYNECOLOGY

## 2021-01-14 PROCEDURE — 76816 OB US FOLLOW-UP PER FETUS: CPT | Performed by: OBSTETRICS & GYNECOLOGY

## 2021-01-14 RX ORDER — BLOOD-GLUCOSE METER
KIT MISCELLANEOUS
Qty: 1 KIT | Refills: 0 | Status: ON HOLD
Start: 2021-01-14 | End: 2021-02-09 | Stop reason: HOSPADM

## 2021-01-14 RX ORDER — GLUCOSAMINE HCL/CHONDROITIN SU 500-400 MG
CAPSULE ORAL
Qty: 100 STRIP | Refills: 3 | Status: ON HOLD
Start: 2021-01-14 | End: 2021-02-09 | Stop reason: HOSPADM

## 2021-01-14 RX ORDER — LANCETS 30 GAUGE
EACH MISCELLANEOUS
Qty: 100 EACH | Refills: 3 | Status: ON HOLD
Start: 2021-01-14 | End: 2021-02-09 | Stop reason: HOSPADM

## 2021-01-14 NOTE — TELEPHONE ENCOUNTER
Called Wojciech to schedule 1 week follow up appointment at [place] [address] with Dr. Som Alicea. Wojciech will check her schedule for next week (1/18/21) and call back to make her appointment.

## 2021-01-14 NOTE — PROGRESS NOTES
Nst completed. Baseline 140  with moderate variabilty+ accelelrations. Ctx noted q 3-4min. occ variable noted Reactive per dr Garrett Fernández.  Pt to L+D for evaluation of ctx report to marta santizo [facility]

## 2021-01-14 NOTE — PROGRESS NOTES
34.4 reports to unit from [facility]  For contractions on NST. Patient denies any pain. Reports good fetal movement.  Denies any LOF or VB    House officer notified of patient

## 2021-01-14 NOTE — H&P
Department of Obstetrics and Gynecology  Attending Obstetrics History and Physical      HISTORY OF PRESENT ILLNESS:      The patient is a 27 y.o.  1 parity 0 at 34 weeks 4 days. Sent form [facility] for ctx's on nst. She denies feeling ctx's. Some dysuria    Estimated Due Date:  21  Contractions:  no  Leaking of fluid: no  nfm  Blleeding:  No    PRENATAL CARE:    Complications: chronic htn      Active Problems:    Primiparity, third trimester  Resolved Problems:    * No resolved hospital problems. *        PAST OB HISTORY    OB History    Para Term  AB Living   1             SAB TAB Ectopic Molar Multiple Live Births                    # Outcome Date GA Lbr Guillaume/2nd Weight Sex Delivery Anes PTL Lv   1 Current                    Pre-eclampsia:  No      :  No      D & C:  No      Cerclage:  No      LEEP:  No      Myomectomy:  No       Labor: No    Past Medical History:    Past Medical History:   Diagnosis Date    Hypertension     PCOS (polycystic ovarian syndrome)         Past Surgical History:    Past Surgical History:   Procedure Laterality Date    APPENDECTOMY          Past Family History:  No family history on file. ROS:  Const: No fever, chills, night sweats, no recent unexplained weight gain/loss  HEENT: No blurred vision, double vision; no ear problems; no sore throat, congestion; no running nose. Resp: No cough, no sputum, no pleuritic chest pain, no sob  Cardio: No chest pain, no exertional dyspnea, no PND, no orthopnea, no palpitation, no leg swelling. GI: No dysphagia, no reflux; no abdominal pain, no n/v; no c/d.  No hematochezia    : No dysuria, no frequency, hesitancy; no hematuria  MSK: no joint pain, no myalgia, no change in ROM  Neuro: no focal weakness in extremities, no slurred speech, no double vision, no numbness or tingling in extremities  Endo: no heat/cold intolerance, no polyphagia, polydipsia or polyuria Hem: no increased bleeding, no bruising, no lymphadenopathy  Skin: no skin changes  Psych: no depressed mood, no suicidal ideation    Social History:     reports that she has never smoked. She has never used smokeless tobacco. She reports previous alcohol use. She reports that she does not use drugs. Medications Prior to Admission:  Medications Prior to Admission: pantoprazole (PROTONIX) 20 MG tablet,   LABETALOL HCL PO, Take 300 mg by mouth 2 times daily   docusate sodium (COLACE) 100 MG capsule, Take 100 mg by mouth daily   Prenatal Vit-Fe Fumarate-FA (PRENATAL PO), Take by mouth  Lancets MISC, 4 times daily. Do fasting blood sugar+ test blood sugar 2 hours after each meal  blood glucose monitor strips, Use In Vitro route 4 times daily. Do fasting blood sugar+ test blood sugar 2 hours after each meal  glucose monitoring kit (FREESTYLE) monitoring kit, Dispense one meter  [DISCONTINUED] magnesium oxide (MAG-OX) 400 (241.3 Mg) MG TABS tablet, Take 1 tablet by mouth daily    Allergies:  Patient has no known allergies. PHYSICAL EXAM:  LMP 05/17/2020    Bp= 124/61  General appearance: Comfortable  Lungs:  CTA   Heart:  Regular Rhythm  Abdomen:  Soft, non-tender, gravid  Fetal heart rate:   reactive  Cervix:    DILATION: Closed  EFFACEMENT:   50%  STATION:  -3 cm  CONSISTENCY:  medium  POSITION:  posterior    Contraction frequency:   none  Membranes:  Intact      ASSESSMENT     IUP at 34 weeks. No ctx's seen  dysuria        Plan: discussed with dr Pamela Rodrigues.  Wants u/a          Electronically signed by Jackie Bal MD on 1/14/2021 at 11:37 AM

## 2021-01-22 ENCOUNTER — ROUTINE PRENATAL (OUTPATIENT)
Dept: OBGYN CLINIC | Age: 31
End: 2021-01-22
Payer: COMMERCIAL

## 2021-01-22 VITALS
HEART RATE: 90 BPM | BODY MASS INDEX: 45.99 KG/M2 | WEIGHT: 293 LBS | SYSTOLIC BLOOD PRESSURE: 118 MMHG | TEMPERATURE: 97.1 F | DIASTOLIC BLOOD PRESSURE: 74 MMHG | HEIGHT: 67 IN

## 2021-01-22 DIAGNOSIS — O24.410 DIET CONTROLLED GESTATIONAL DIABETES MELLITUS (GDM), ANTEPARTUM: ICD-10-CM

## 2021-01-22 DIAGNOSIS — Z3A.35 35 WEEKS GESTATION OF PREGNANCY: ICD-10-CM

## 2021-01-22 LAB
GLUCOSE URINE, POC: NEGATIVE
PROTEIN UA: NEGATIVE

## 2021-01-22 PROCEDURE — 76820 UMBILICAL ARTERY ECHO: CPT | Performed by: OBSTETRICS & GYNECOLOGY

## 2021-01-22 PROCEDURE — 81002 URINALYSIS NONAUTO W/O SCOPE: CPT | Performed by: OBSTETRICS & GYNECOLOGY

## 2021-01-22 PROCEDURE — 99213 OFFICE O/P EST LOW 20 MIN: CPT | Performed by: OBSTETRICS & GYNECOLOGY

## 2021-01-22 PROCEDURE — 76815 OB US LIMITED FETUS(S): CPT | Performed by: OBSTETRICS & GYNECOLOGY

## 2021-01-22 PROCEDURE — 76818 FETAL BIOPHYS PROFILE W/NST: CPT | Performed by: OBSTETRICS & GYNECOLOGY

## 2021-01-22 PROCEDURE — 99999 PR OFFICE/OUTPT VISIT,PROCEDURE ONLY: CPT | Performed by: OBSTETRICS & GYNECOLOGY

## 2021-01-22 NOTE — PATIENT INSTRUCTIONS
Please arrive for your scheduled appointment at least 15 minutes early with your actual insurance card+ a photo ID. Also if you need any refills ordered or have questions, it may take up 48 hours to reply. Please allow ample time for your refills. Call me when you use last refill. Thank you for your cooperation. Any questions contact [facility] at [phone]. If you are experiencing an emergency and need immediate help, call 911 or go to go emergency room or labor and delivery. if you are sick, not feeling well or have an infectious process going on please reschedule your appointment by calling [phone]. Also if any family members are not feeling well, please do not bring them to your appointment. We appreciate your cooperation. We are doing this in order to protect our pregnant mothers+ their babies. Call your primary obstetrician with bleeding, leaking of fluid, abdominal tenderness, headache, blurry vision, epigastric pain and increased urinary frequency. Do kick counts after dinner. Call your primary obstetrician if less than 10 kicks in 2 hours after dinner. Call your primary obstetrician with bleeding, leaking of fluid, abdominal tenderness, headache, blurry vision, epigastric pain and increased urinary frequency. You might be having an NST at your next appt. Please eat a large snack or breakfast before coming to office. Thank you  Patient Education        Weeks 34 to 39 of Your Pregnancy: Care Instructions  Your Care Instructions     By now, your baby and your belly have grown quite large. It is almost time to give birth. Your baby's lungs are almost ready to breathe air. The bones in your baby's head are now firm enough to protect it, but soft enough to move down through the birth canal.  You may feel excited, happy, anxious, or scared. You may wonder how you will know if you are in labor or what to expect during labor. Try to be flexible in your expectations of the birth.  Because each birth is different, there is no way to know exactly what childbirth will be like for you. This care sheet will help you know what to expect and how to prepare. This may make your childbirth easier. If you haven't already had the Tdap shot during this pregnancy, talk to your doctor about getting it. It will help protect your  against pertussis infection. In the 36th week, most women have a test for group B streptococcus (GBS). GBS is a common bacteria that can live in the vagina and rectum. It can make your baby sick after birth. If you test positive, you will get antibiotics during labor. The medicine will keep your baby from getting the bacteria. Follow-up care is a key part of your treatment and safety. Be sure to make and go to all appointments, and call your doctor if you are having problems. It's also a good idea to know your test results and keep a list of the medicines you take. How can you care for yourself at home? Learn about pain relief choices  · Pain is different for every woman. Talk with your doctor about your feelings about pain. · You can choose from several types of pain relief. These include medicine or breathing techniques, as well as comfort measures. You can use more than one option. · If you choose to have pain medicine during labor, talk to your doctor about your options. Some medicines lower anxiety and help with some of the pain. Others make your lower body numb so that you won't feel pain. · Be sure to tell your doctor about your pain medicine choice before you start labor or very early in your labor. You may be able to change your mind as labor progresses. · Rarely, a woman is put to sleep by medicine given through a mask or an IV. Labor and delivery  · The first stage of labor has three parts: early, active, and transition. ? Most women have early labor at home. You can stay busy or rest, eat light snacks, drink clear fluids, and start counting contractions. ?  When talking during a contraction gets hard, you may be moving to active labor. During active labor, you should head for the hospital if you are not there already. ? You are in active labor when contractions come every 3 to 4 minutes and last about 60 seconds. Your cervix is opening more rapidly. ? If your water breaks, contractions will come faster and stronger. ? During transition, your cervix is stretching, and contractions are coming more rapidly. ? You may want to push, but your cervix might not be ready. Your doctor will tell you when to push. · The second stage starts when your cervix is completely opened and you are ready to push. ? Contractions are very strong to push the baby down the birth canal.  ? You will feel the urge to push. You may feel like you need to have a bowel movement. ? You may be coached to push with contractions. These contractions will be very strong, but you will not have them as often. You can get a little rest between contractions. ? You may be emotional and irritable. You may not be aware of what is going on around you.  ? One last push, and your baby is born. · The third stage is when a few more contractions push out the placenta. This may take 30 minutes or less. · The fourth stage is the welcome recovery. You may feel overwhelmed with emotions and exhausted but alert. This is a good time to start breastfeeding. Where can you learn more? Go to https://fotopediajustyn.healthMusicXray. org and sign in to your [email or web address] account. Enter S111 in the Datezr box to learn more about \"Weeks 34 to 36 of Your Pregnancy: Care Instructions. \"     If you do not have an account, please click on the \"Sign Up Now\" link. Current as of: February 11, 2020               Content Version: 12.6  © 8784-4212 [employer]. Care instructions adapted under license by [facility] [employer] [facility] ([facility]).  If you have questions about a medical condition or this instruction, always ask your healthcare professional. UsingMiles, [facility] disclaims any warranty or liability for your use of this information. Patient Education        Learning About When to Call Your Doctor During Pregnancy (After 20 Weeks)  Your Care Instructions  It's common to have concerns about what might be a problem during pregnancy. Although most pregnant women don't have any serious problems, it's important to know when to call your doctor if you have certain symptoms or signs of labor. These are general suggestions. Your doctor may give you some more information about when to call. When to call your doctor (after 20 weeks)  Call 911 anytime you think you may need emergency care. For example, call if:  · You have severe vaginal bleeding. · You have sudden, severe pain in your belly. · You passed out (lost consciousness). · You have a seizure. · You see or feel the umbilical cord. · You think you are about to deliver your baby and can't make it safely to the hospital.  Call your doctor now or seek immediate medical care if:  · You have vaginal bleeding. · You have belly pain. · You have a fever. · You have symptoms of preeclampsia, such as:  ? Sudden swelling of your face, hands, or feet. ? New vision problems (such as dimness, blurring, or seeing spots). ? A severe headache. · You have a sudden release of fluid from your vagina. (You think your water broke.)  · You think that you may be in labor. This means that you've had at least 6 contractions in an hour. · You notice that your baby has stopped moving or is moving much less than normal.  · You have symptoms of a urinary tract infection. These may include:  ? Pain or burning when you urinate. ? A frequent need to urinate without being able to pass much urine. ? Pain in the flank, which is just below the rib cage and above the waist on either side of the back. ? Blood in your urine.   Watch closely for changes in your health, and be sure to contact your doctor if:  · You have vaginal discharge that smells bad. · You have skin changes, such as:  ? A rash. ? Itching. ? Yellow color to your skin. · You have other concerns about your pregnancy. If you have labor signs at 37 weeks or more  If you have signs of labor at 37 weeks or more, your doctor may tell you to call when your labor becomes more active. Symptoms of active labor include:  · Contractions that are regular. · Contractions that are less than 5 minutes apart. · Contractions that are hard to talk through. Follow-up care is a key part of your treatment and safety. Be sure to make and go to all appointments, and call your doctor if you are having problems. It's also a good idea to know your test results and keep a list of the medicines you take. Where can you learn more? Go to https://Lifestanderpe7Roadeb.VIOSO. org and sign in to your [employer] account. Enter  in the Storitz box to learn more about \"Learning About When to Call Your Doctor During Pregnancy (After 20 Weeks). \"     If you do not have an account, please click on the \"Sign Up Now\" link. Current as of: February 11, 2020               Content Version: 12.6  © 7625-4809 [employer]. Care instructions adapted under license by [address] If you have questions about a medical condition or this instruction, always ask your healthcare professional. [place] [address] any warranty or liability for your use of this information. Patient Education        Counting Your Baby's Kicks: Care Instructions  Your Care Instructions     Counting your baby's kicks is one way your doctor can tell that your baby is healthy. Most women--especially in a first pregnancy--feel their baby move for the first time between 16 and 22 weeks. The movement may feel like flutters rather than kicks. Your baby may move more at certain times of the day. When you are active, you may notice less kicking than when you are resting.  At your prenatal visits, your doctor will ask whether the baby is active. In your last trimester, your doctor may ask you to count the number of times you feel your baby move. Follow-up care is a key part of your treatment and safety. Be sure to make and go to all appointments, and call your doctor if you are having problems. It's also a good idea to know your test results and keep a list of the medicines you take. How do you count fetal kicks? · A common method of checking your baby's movement is to count the number of kicks or moves you feel in 1 hour. Ten movements (such as kicks, flutters, or rolls) in 1 hour are normal. Some doctors suggest that you count in the morning until you get to 10 movements. Then you can quit for that day and start again the next day. · Pick your baby's most active time of day to count. This may be any time from morning to evening. · If you do not feel 10 movements in an hour, your baby may be sleeping. Wait for the next hour and count again. When should you call for help? Call your doctor now or seek immediate medical care if:    · You noticed that your baby has stopped moving or is moving much less than normal.   Watch closely for changes in your health, and be sure to contact your doctor if you have any problems. Where can you learn more? Go to https://PureHistoryreeseVibes.Core Oncology. org and sign in to your hopTo account. Enter L600 in the Meebo box to learn more about \"Counting Your Baby's Kicks: Care Instructions. \"     If you do not have an account, please click on the \"Sign Up Now\" link. Current as of: February 11, 2020               Content Version: 12.6  © 2277-6676 [employer]CTOR, Incorporated. Care instructions adapted under license by [facility] ([facility]). If you have questions about a medical condition or this instruction, always ask your healthcare professional. [address] any warranty or liability for your use of this information. Patient Education        Diabetes Blood Sugar Emergencies: Your Action Plan  How can you prevent a blood sugar emergency? An important part of living with diabetes is keeping your blood sugar in your target range. You'll need to know what to do if it's too high or too low. Managing your blood sugar levels helps you avoid emergencies. This care sheet will teach you about the signs of high and low blood sugar. It will help you make an action plan with your doctor for when these signs occur. Low blood sugar is more likely to happen if you take certain medicines for diabetes. It can also happen if you skip a meal, drink alcohol, or exercise more than usual.  You may get high blood sugar if you eat differently than you normally do. One example is eating more carbohydrate than usual. Having a cold, the flu, or other sudden illness can also cause high blood sugar levels. Levels can also rise if you miss a dose of medicine. Any change in how you take your medicine may affect your blood sugar level. So it's important to work with your doctor before you make any changes. Check your blood sugar  Work with your doctor to fill in the blank spaces below that apply to you. Track your levels, know your target range, and write down ways you can get your blood sugar back in your target range. A log book can help you track your levels. Take the book to all of your medical appointments. · Check your blood sugar _____ times a day, at these times:________________________________________________. (For example: Before meals, at bedtime, before exercise, during exercise, other.)  · Your blood sugar target range before a meal is ___________________. Your blood sugar target range after a meal is _______________________. · Do this--___________________________________________________--to get your blood sugar back within your safe range if your blood sugar results are _________________________________________.  (For example: Less than 70 or above 250 mg/dL.)  Call your doctor when your blood sugar results are ___________________________________. (For example: Less than 70 or above 250 mg/dL.)  What are the symptoms of low and high blood sugar? Common symptoms of low blood sugar are sweating and feeling shaky, weak, hungry, or confused. Symptoms can start quickly. Common symptoms of high blood sugar are feeling very thirsty or very hungry. You may also pass urine more often than usual. You may have blurry vision and may lose weight without trying. But some people may have high or low blood sugar without having any symptoms. That's a good reason to check your blood sugar on a regular schedule. What should you do if you have symptoms? Work with your doctor to fill in the blank spaces below that apply to you. Low blood sugar  If you have symptoms of low blood sugar, check your blood sugar. If it's below _____ ( for example, below 70), eat or drink a quick-sugar food that has about 15 grams of carbohydrate. Your goal is to get your level back to your safe range. Check your blood sugar again 15 minutes later. If it's still not in your target range, take another 15 grams of carbohydrate and check your blood sugar again in 15 minutes. Repeat this until you reach your target. Then go back to your regular testing schedule. Children usually need less than 15 grams of carbohydrate. Check with your doctor or diabetes educator for the amount that is right for your child. When you have low blood sugar, it's best to stop or reduce any physical activity until your blood sugar is back in your target range and is stable. If you must stay active, eat or drink 30 grams of carbohydrate. Then check your blood sugar again in 15 minutes. If it's not in your target range, take another 30 grams of carbohydrates. Check your blood sugar again in 15 minutes. Keep doing this until you reach your target. You can then go back to your regular testing schedule.   If your symptoms or blood sugar levels are getting worse or have not improved after 15 minutes, seek medical care right away. Here are some examples of quick-sugar foods with 15 grams of carbohydrate:  · 3 or 4 glucose tablets  · 1 tablespoon (3 teaspoons) table sugar  · ½ cup to ¾ cup (4 to 6 ounces) of fruit juice or regular (not diet) soda  · Hard candy (such as 6 Life Savers)  High blood sugar  If you have symptoms of high blood sugar, check your blood sugar. Your goal is to get your level back to your target range. If it's above ______ ( for example, above 250), follow these steps:  · If you missed a dose of your diabetes medicine, take it now. Take only the amount of medicine that you have been prescribed. Do not take more or less medicine. · Give yourself insulin if your doctor has prescribed it for high blood sugar. · Test for ketones, if the doctor told you to do so. If the results of the ketone test show a moderate-to-large amount of ketones, call the doctor for advice. · Wait 30 minutes after you take the extra insulin or the missed medicine. Check your blood sugar again. If your symptoms or blood sugar levels are getting worse or have not improved after taking these steps, seek medical care right away. Follow-up care is a key part of your treatment and safety. Be sure to make and go to all appointments, and call your doctor if you are having problems. It's also a good idea to know your test results and keep a list of the medicines you take. Where can you learn more? Go to https://QuickSolarjustyn.BestVendor. org and sign in to your Flightfox account. Enter V551 in the [facility] box to learn more about \"Diabetes Blood Sugar Emergencies: Your Action Plan. \"     If you do not have an account, please click on the \"Sign Up Now\" link. Current as of: December 20, 2019               Content Version: 12.6  © 8720-3009 GreenGar, Incorporated.    Care instructions adapted under license by [id number] [facility]. If you have questions about a medical condition or this instruction, always ask your healthcare professional. [place] [address] any warranty or liability for your use of this information. Patient Education        High Blood Pressure in Pregnancy: Care Instructions  Your Care Instructions     High blood pressure (hypertension) means that the force of blood against your artery walls is too strong. Mild high blood pressure during pregnancy is not usually dangerous. Your doctor will probably just want to watch you closely. But when blood pressure is very high, it can reduce oxygen to your baby. This can affect how well your baby grows. High blood pressure also means that you are at higher risk for:  · Preeclampsia. This is a problem that includes high blood pressure and damage to your liver or kidneys. It can also reduce how much oxygen your baby gets. In some cases, it leads to eclampsia. Eclampsia causes seizures. · Placental abruption. This is a problem when the placenta separates from the uterus before birth. It prevents the baby from getting enough oxygen and nutrients. Sometimes it can cause death for the baby and the mother. To prevent problems for you or your baby, you will have to check your blood pressure often. You will do this until after your baby is born. If your blood pressure rises suddenly or is very high during your pregnancy, your doctor may prescribe medicines. They can usually control blood pressure. If your blood pressure affects your or your baby's health, your doctor may need to deliver your baby early. After your baby is born, your blood pressure will probably improve. But sometimes blood pressure problems continue after birth. Follow-up care is a key part of your treatment and safety. Be sure to make and go to all appointments, and call your doctor if you are having problems.  It's also a good idea to know your test results and keep a list of the medicines you take. How can you care for yourself at home? · Take and write down your blood pressure at home if your doctor says to. · Take your medicines exactly as prescribed. Call your doctor if you think you are having a problem with your medicine. · Do not smoke. This is one of the best things you can do to help your baby be healthy. If you need help quitting, talk to your doctor about stop-smoking programs and medicines. These can increase your chances of quitting for good. · Don't gain too much weight during pregnancy. Talk to your doctor about how much weight gain is healthy. · Eat a healthy diet. · If your doctor says it's okay, get regular exercise. Walking or swimming several times a week can be healthy for you and your baby. · Reduce stress, and find time to relax. This is very important if you continue to work or have a busy schedule. It's also important if you have small children at home. When should you call for help? Call 911 anytime you think you may need emergency care. For example, call if:    · You passed out (lost consciousness).     · You have a seizure. Call your doctor now or seek immediate medical care if:    · You have symptoms of preeclampsia, such as:  ? Sudden swelling of your face, hands, or feet. ? New vision problems (such as dimness, blurring, or seeing spots). ? A severe headache.     · Your blood pressure is higher than it should be or rises suddenly.     · You have new nausea or vomiting.     · You think that you are in labor.     · You have pain in your belly or pelvis. Watch closely for changes in your health, and be sure to contact your doctor if:    · You gain weight rapidly. Where can you learn more? Go to https://GoYoDeomattheweb.Synarc. org and sign in to your [employer] account. Enter J321 in the Samba Energy box to learn more about \"High Blood Pressure in Pregnancy: Care Instructions. \"     If you do not have an account, please click on the \"Sign Up Now\" link. Current as of: February 11, 2020               Content Version: 12.6  © 1439-7160 Viyet, Incorporated. Care instructions adapted under license by [facility] ([facility]). If you have questions about a medical condition or this instruction, always ask your healthcare professional. [address] any warranty or liability for your use of this information.

## 2021-01-22 NOTE — PROGRESS NOTES
Nst completed. Baseline 130  with moderate variabilty+ accelelrations.  Occasional ctx noted Reactive per dr Darlene Montes

## 2021-01-27 ENCOUNTER — HOSPITAL ENCOUNTER (OUTPATIENT)
Age: 31
Discharge: HOME | End: 2021-01-27
Attending: OBSTETRICS & GYNECOLOGY | Admitting: OBSTETRICS & GYNECOLOGY
Payer: COMMERCIAL

## 2021-01-27 VITALS
DIASTOLIC BLOOD PRESSURE: 62 MMHG | SYSTOLIC BLOOD PRESSURE: 126 MMHG | TEMPERATURE: 98.1 F | HEART RATE: 90 BPM | RESPIRATION RATE: 18 BRPM

## 2021-01-27 PROBLEM — Z3A.36 PREGNANCY WITH 36 COMPLETED WEEKS GESTATION: Status: ACTIVE | Noted: 2021-01-27

## 2021-01-27 LAB
ALBUMIN SERPL-MCNC: 3.7 G/DL (ref 3.5–5.2)
ALP BLD-CCNC: 104 U/L (ref 35–104)
ALT SERPL-CCNC: 21 U/L (ref 0–32)
ANION GAP SERPL CALCULATED.3IONS-SCNC: 10 MMOL/L (ref 7–16)
AST SERPL-CCNC: 23 U/L (ref 0–31)
BACTERIA: ABNORMAL /HPF
BILIRUB SERPL-MCNC: 0.3 MG/DL (ref 0–1.2)
BILIRUBIN URINE: NEGATIVE
BLOOD, URINE: NEGATIVE
BUN BLDV-MCNC: 12 MG/DL (ref 6–20)
CALCIUM SERPL-MCNC: 9.2 MG/DL (ref 8.6–10.2)
CHLORIDE BLD-SCNC: 102 MMOL/L (ref 98–107)
CLARITY: CLEAR
CO2: 21 MMOL/L (ref 22–29)
COLOR: YELLOW
CREAT SERPL-MCNC: 0.6 MG/DL (ref 0.5–1)
EPITHELIAL CELLS, UA: ABNORMAL /HPF
GFR AFRICAN AMERICAN: >60
GFR NON-AFRICAN AMERICAN: >60 ML/MIN/1.73
GLUCOSE BLD-MCNC: 139 MG/DL (ref 74–99)
GLUCOSE URINE: NEGATIVE MG/DL
HCT VFR BLD CALC: 36.6 % (ref 34–48)
HEMOGLOBIN: 11.8 G/DL (ref 11.5–15.5)
KETONES, URINE: NEGATIVE MG/DL
LEUKOCYTE ESTERASE, URINE: NEGATIVE
MCH RBC QN AUTO: 27 PG (ref 26–35)
MCHC RBC AUTO-ENTMCNC: 32.2 % (ref 32–34.5)
MCV RBC AUTO: 83.8 FL (ref 80–99.9)
NITRITE, URINE: NEGATIVE
PDW BLD-RTO: 15.4 FL (ref 11.5–15)
PH UA: 6 (ref 5–9)
PLATELET # BLD: 250 E9/L (ref 130–450)
PMV BLD AUTO: 11 FL (ref 7–12)
POTASSIUM SERPL-SCNC: 3.8 MMOL/L (ref 3.5–5)
PROTEIN UA: NEGATIVE MG/DL
RBC # BLD: 4.37 E12/L (ref 3.5–5.5)
RBC UA: ABNORMAL /HPF (ref 0–2)
SODIUM BLD-SCNC: 133 MMOL/L (ref 132–146)
SPECIFIC GRAVITY UA: 1.01 (ref 1–1.03)
TOTAL PROTEIN: 6.7 G/DL (ref 6.4–8.3)
URIC ACID, SERUM: 5.4 MG/DL (ref 2.4–5.7)
UROBILINOGEN, URINE: 0.2 E.U./DL
WBC # BLD: 11.7 E9/L (ref 4.5–11.5)
WBC UA: ABNORMAL /HPF (ref 0–5)

## 2021-01-27 PROCEDURE — 36415 COLL VENOUS BLD VENIPUNCTURE: CPT

## 2021-01-27 PROCEDURE — 99226 PR SBSQ OBSERVATION CARE/DAY 35 MINUTES: CPT | Performed by: PHYSICIAN ASSISTANT

## 2021-01-27 PROCEDURE — 81001 URINALYSIS AUTO W/SCOPE: CPT

## 2021-01-27 PROCEDURE — 85027 COMPLETE CBC AUTOMATED: CPT

## 2021-01-27 PROCEDURE — 99211 OFF/OP EST MAY X REQ PHY/QHP: CPT

## 2021-01-27 PROCEDURE — 84550 ASSAY OF BLOOD/URIC ACID: CPT

## 2021-01-27 PROCEDURE — 80053 COMPREHEN METABOLIC PANEL: CPT

## 2021-01-27 ASSESSMENT — PAIN DESCRIPTION - DESCRIPTORS: DESCRIPTORS: HEADACHE

## 2021-01-27 NOTE — PROGRESS NOTES
Spoke with dr Kyle Smith. Updated on lab results and BP trending. Orders given for discharge.  Patient to keep scheduled appts

## 2021-01-27 NOTE — PROGRESS NOTES
Pt reports to unit from office for [facility] [employer]. Orders given    Patient has history of HTN before pregnancy. On labetalol now TID 300mg. GDM, diet controlled. Patient reports having a HA now that she rates 3/10, feeling behind her eye. States \" started in the office or I would have taken tylenol\". Denies any cramping, contractions, LOF or VB.      36.3 21

## 2021-01-27 NOTE — H&P
Department of Obstetrics and Gynecology  Labor and Delivery   Physician Assistant Obstetrics History and Physical      Patient Name: Claude Mondragon  YOB: 1990   MRN: [id number]    CHIEF COMPLAINT:  elevated blood pressure    HISTORY OF PRESENT ILLNESS:    The patient is a 27 y.o. female,  A0, Estimated Date of Delivery: 21, EGA: 36 and 3/7 weeks presents to L&D  Antepartum from office secondary to elevated /80, repeated 150/90 & c/o a mild headache that started while being seen there. She rates her headache as a \"3\"/10 and describes the headache location above her left eye. Did not take Tylenol as headache just started ~ an hour ago. Patient denies leakage of fluid, vaginal bleeding, contractions, cramping, swelling, RUQ or epigastric pain, visual changes, or urinary symptoms. Perceived fetal movement is good. Her current obstetrical history is significant for:   Chronic hypertension, takes labetalol 300 mg po TID  Maternal morbid obesity: BMI 52.63 kg/m2  GDM A1  PCOS    Lab Review  GBS: negative    PAST OB HISTORY: not applicable      Past Medical History:    Diagnosis Date    Diet controlled gestational diabetes mellitus (GDM), antepartum 2021    Hypertension     PCOS (polycystic ovarian syndrome)         Past Surgical History:    Procedure Laterality Date    APPENDECTOMY          Medications Prior to Admission:  Medications Prior to Admission: pantoprazole (PROTONIX) 20 MG tablet,   LABETALOL HCL PO, Take 300 mg by mouth 3 times daily   docusate sodium (COLACE) 100 MG capsule, Take 100 mg by mouth daily   Prenatal Vit-Fe Fumarate-FA (PRENATAL PO), Take by mouth  Lancets MISC, 4 times daily. Do fasting blood sugar+ test blood sugar 2 hours after each meal  blood glucose monitor strips, Use In Vitro route 4 times daily.  Do fasting blood sugar+ test blood sugar 2 hours after each meal  glucose monitoring kit (FREESTYLE) monitoring kit, Dispense one meter Membranes: Intact       ASSESSMENT AND PLAN:  27 y.o. female,  A0 at 39 and 3/7 weeks of gestation  From office, elevated BP and headache    R/o PIH    Chronic hypertension      Orders per Dr. Sammy Gaming:  EFM  Observation  Cycle BP's  PIH labs  U/a      Electronically signed by Federica Spear PA-C on 2021 at 10:01 AM

## 2021-01-29 ENCOUNTER — ANCILLARY PROCEDURE (OUTPATIENT)
Dept: OBGYN CLINIC | Age: 31
End: 2021-01-29
Payer: COMMERCIAL

## 2021-01-29 ENCOUNTER — ROUTINE PRENATAL (OUTPATIENT)
Dept: OBGYN CLINIC | Age: 31
End: 2021-01-29
Payer: COMMERCIAL

## 2021-01-29 VITALS
TEMPERATURE: 97.1 F | SYSTOLIC BLOOD PRESSURE: 131 MMHG | DIASTOLIC BLOOD PRESSURE: 81 MMHG | HEIGHT: 67 IN | HEART RATE: 91 BPM | WEIGHT: 293 LBS | BODY MASS INDEX: 45.99 KG/M2

## 2021-01-29 DIAGNOSIS — Z3A.36 36 WEEKS GESTATION OF PREGNANCY: ICD-10-CM

## 2021-01-29 LAB
GLUCOSE URINE, POC: NEGATIVE
PROTEIN UA: NEGATIVE

## 2021-01-29 PROCEDURE — 81002 URINALYSIS NONAUTO W/O SCOPE: CPT | Performed by: OBSTETRICS & GYNECOLOGY

## 2021-01-29 PROCEDURE — 76818 FETAL BIOPHYS PROFILE W/NST: CPT | Performed by: OBSTETRICS & GYNECOLOGY

## 2021-01-29 PROCEDURE — 99212 OFFICE O/P EST SF 10 MIN: CPT | Performed by: OBSTETRICS & GYNECOLOGY

## 2021-01-29 PROCEDURE — 76815 OB US LIMITED FETUS(S): CPT | Performed by: OBSTETRICS & GYNECOLOGY

## 2021-01-29 NOTE — PATIENT INSTRUCTIONS
Please arrive for your scheduled appointment at least 15 minutes early with your actual insurance card+ a photo ID. Also if you need any refills ordered or have questions, it may take up 48 hours to reply. Please allow ample time for your refills. Call me when you use last refill. Thank you for your cooperation. Any questions contact [facility] at [phone]. If you are experiencing an emergency and need immediate help, call 911 or go to go emergency room or labor and delivery. if you are sick, not feeling well or have an infectious process going on please reschedule your appointment by calling [phone]. Also if any family members are not feeling well, please do not bring them to your appointment. We appreciate your cooperation. We are doing this in order to protect our pregnant mothers+ their babies. Call your primary obstetrician with bleeding, leaking of fluid, abdominal tenderness, headache, blurry vision, epigastric pain and increased urinary frequency. Do kick counts after dinner. Call your primary obstetrician if less than 10 kicks in 2 hours after dinner. Call your primary obstetrician with bleeding, leaking of fluid, abdominal tenderness, headache, blurry vision, epigastric pain and increased urinary frequency. You might be having an NST at your next appt. Please eat a large snack or breakfast before coming to office. Thank you  Patient Education        Weeks 34 to 39 of Your Pregnancy: Care Instructions  Your Care Instructions     By now, your baby and your belly have grown quite large. It is almost time to give birth. Your baby's lungs are almost ready to breathe air. The bones in your baby's head are now firm enough to protect it, but soft enough to move down through the birth canal.  You may feel excited, happy, anxious, or scared. You may wonder how you will know if you are in labor or what to expect during labor. Try to be flexible in your expectations of the birth.  Because each birth is different, contraction gets hard, you may be moving to active labor. During active labor, you should head for the hospital if you are not there already. ? You are in active labor when contractions come every 3 to 4 minutes and last about 60 seconds. Your cervix is opening more rapidly. ? If your water breaks, contractions will come faster and stronger. ? During transition, your cervix is stretching, and contractions are coming more rapidly. ? You may want to push, but your cervix might not be ready. Your doctor will tell you when to push. · The second stage starts when your cervix is completely opened and you are ready to push. ? Contractions are very strong to push the baby down the birth canal.  ? You will feel the urge to push. You may feel like you need to have a bowel movement. ? You may be coached to push with contractions. These contractions will be very strong, but you will not have them as often. You can get a little rest between contractions. ? You may be emotional and irritable. You may not be aware of what is going on around you.  ? One last push, and your baby is born. · The third stage is when a few more contractions push out the placenta. This may take 30 minutes or less. · The fourth stage is the welcome recovery. You may feel overwhelmed with emotions and exhausted but alert. This is a good time to start breastfeeding. Where can you learn more? Go to https://Lucid Holdingsjustyn.GeoPage. org and sign in to your [employer] account. Enter L318 in the [employer] box to learn more about \"Weeks 34 to 36 of Your Pregnancy: Care Instructions. \"     If you do not have an account, please click on the \"Sign Up Now\" link. Current as of: February 11, 2020               Content Version: 12.6  © 4411-5372 [employer], Incorporated. Care instructions adapted under license by [facility] ([facility]).  If you have questions about a medical condition or this instruction, always ask your healthcare professional. [employer], [facility] disclaims any warranty or liability for your use of this information. Patient Education        Learning About When to Call Your Doctor During Pregnancy (After 20 Weeks)  Your Care Instructions  It's common to have concerns about what might be a problem during pregnancy. Although most pregnant women don't have any serious problems, it's important to know when to call your doctor if you have certain symptoms or signs of labor. These are general suggestions. Your doctor may give you some more information about when to call. When to call your doctor (after 20 weeks)  Call 911 anytime you think you may need emergency care. For example, call if:  · You have severe vaginal bleeding. · You have sudden, severe pain in your belly. · You passed out (lost consciousness). · You have a seizure. · You see or feel the umbilical cord. · You think you are about to deliver your baby and can't make it safely to the hospital.  Call your doctor now or seek immediate medical care if:  · You have vaginal bleeding. · You have belly pain. · You have a fever. · You have symptoms of preeclampsia, such as:  ? Sudden swelling of your face, hands, or feet. ? New vision problems (such as dimness, blurring, or seeing spots). ? A severe headache. · You have a sudden release of fluid from your vagina. (You think your water broke.)  · You think that you may be in labor. This means that you've had at least 6 contractions in an hour. · You notice that your baby has stopped moving or is moving much less than normal.  · You have symptoms of a urinary tract infection. These may include:  ? Pain or burning when you urinate. ? A frequent need to urinate without being able to pass much urine. ? Pain in the flank, which is just below the rib cage and above the waist on either side of the back. ? Blood in your urine.   Watch closely for changes in your health, and be sure to contact your doctor if:  · You have vaginal discharge that smells bad. · You have skin changes, such as:  ? A rash. ? Itching. ? Yellow color to your skin. · You have other concerns about your pregnancy. If you have labor signs at 37 weeks or more  If you have signs of labor at 37 weeks or more, your doctor may tell you to call when your labor becomes more active. Symptoms of active labor include:  · Contractions that are regular. · Contractions that are less than 5 minutes apart. · Contractions that are hard to talk through. Follow-up care is a key part of your treatment and safety. Be sure to make and go to all appointments, and call your doctor if you are having problems. It's also a good idea to know your test results and keep a list of the medicines you take. Where can you learn more? Go to https://Merge SocialpepicDownstreameb.[facility]. org and sign in to your hetras account. Enter  in the [employer] box to learn more about \"Learning About When to Call Your Doctor During Pregnancy (After 20 Weeks). \"     If you do not have an account, please click on the \"Sign Up Now\" link. Current as of: February 11, 2020               Content Version: 12.6  © 3534-7503 [employer]. Care instructions adapted under license by [facility] ([facility]). If you have questions about a medical condition or this instruction, always ask your healthcare professional. [place] [address] any warranty or liability for your use of this information. Patient Education        Counting Your Baby's Kicks: Care Instructions  Your Care Instructions     Counting your baby's kicks is one way your doctor can tell that your baby is healthy. Most women--especially in a first pregnancy--feel their baby move for the first time between 16 and 22 weeks. The movement may feel like flutters rather than kicks. Your baby may move more at certain times of the day. When you are active, you may notice less kicking than when you are resting.  At your prenatal visits, your doctor will ask whether the baby is active. In your last trimester, your doctor may ask you to count the number of times you feel your baby move. Follow-up care is a key part of your treatment and safety. Be sure to make and go to all appointments, and call your doctor if you are having problems. It's also a good idea to know your test results and keep a list of the medicines you take. How do you count fetal kicks? · A common method of checking your baby's movement is to count the number of kicks or moves you feel in 1 hour. Ten movements (such as kicks, flutters, or rolls) in 1 hour are normal. Some doctors suggest that you count in the morning until you get to 10 movements. Then you can quit for that day and start again the next day. · Pick your baby's most active time of day to count. This may be any time from morning to evening. · If you do not feel 10 movements in an hour, your baby may be sleeping. Wait for the next hour and count again. When should you call for help? Call your doctor now or seek immediate medical care if:    · You noticed that your baby has stopped moving or is moving much less than normal.   Watch closely for changes in your health, and be sure to contact your doctor if you have any problems. Where can you learn more? Go to https://Genesis NetworksmatthewGina Alexander Design.Shawarmanji. org and sign in to your Phone2Action account. Enter U932 in the [facility] box to learn more about \"Counting Your Baby's Kicks: Care Instructions. \"     If you do not have an account, please click on the \"Sign Up Now\" link. Current as of: February 11, 2020               Content Version: 12.6  © 9726-5770 FinanzCheck, Incorporated. Care instructions adapted under license by [facility] ([facility]). If you have questions about a medical condition or this instruction, always ask your healthcare professional. [address] any warranty or liability for your use of this information. than 70 or above 250 mg/dL.)  Call your doctor when your blood sugar results are ___________________________________. (For example: Less than 70 or above 250 mg/dL.)  What are the symptoms of low and high blood sugar? Common symptoms of low blood sugar are sweating and feeling shaky, weak, hungry, or confused. Symptoms can start quickly. Common symptoms of high blood sugar are feeling very thirsty or very hungry. You may also pass urine more often than usual. You may have blurry vision and may lose weight without trying. But some people may have high or low blood sugar without having any symptoms. That's a good reason to check your blood sugar on a regular schedule. What should you do if you have symptoms? Work with your doctor to fill in the blank spaces below that apply to you. Low blood sugar  If you have symptoms of low blood sugar, check your blood sugar. If it's below _____ ( for example, below 70), eat or drink a quick-sugar food that has about 15 grams of carbohydrate. Your goal is to get your level back to your safe range. Check your blood sugar again 15 minutes later. If it's still not in your target range, take another 15 grams of carbohydrate and check your blood sugar again in 15 minutes. Repeat this until you reach your target. Then go back to your regular testing schedule. Children usually need less than 15 grams of carbohydrate. Check with your doctor or diabetes educator for the amount that is right for your child. When you have low blood sugar, it's best to stop or reduce any physical activity until your blood sugar is back in your target range and is stable. If you must stay active, eat or drink 30 grams of carbohydrate. Then check your blood sugar again in 15 minutes. If it's not in your target range, take another 30 grams of carbohydrates. Check your blood sugar again in 15 minutes. Keep doing this until you reach your target. You can then go back to your regular testing schedule.   If your symptoms or blood sugar levels are getting worse or have not improved after 15 minutes, seek medical care right away. Here are some examples of quick-sugar foods with 15 grams of carbohydrate:  · 3 or 4 glucose tablets  · 1 tablespoon (3 teaspoons) table sugar  · ½ cup to ¾ cup (4 to 6 ounces) of fruit juice or regular (not diet) soda  · Hard candy (such as 6 Life Savers)  High blood sugar  If you have symptoms of high blood sugar, check your blood sugar. Your goal is to get your level back to your target range. If it's above ______ ( for example, above 250), follow these steps:  · If you missed a dose of your diabetes medicine, take it now. Take only the amount of medicine that you have been prescribed. Do not take more or less medicine. · Give yourself insulin if your doctor has prescribed it for high blood sugar. · Test for ketones, if the doctor told you to do so. If the results of the ketone test show a moderate-to-large amount of ketones, call the doctor for advice. · Wait 30 minutes after you take the extra insulin or the missed medicine. Check your blood sugar again. If your symptoms or blood sugar levels are getting worse or have not improved after taking these steps, seek medical care right away. Follow-up care is a key part of your treatment and safety. Be sure to make and go to all appointments, and call your doctor if you are having problems. It's also a good idea to know your test results and keep a list of the medicines you take. Where can you learn more? Go to https://ny.Showbucks. org and sign in to your [employer] account. Enter J399 in the [facility] box to learn more about \"Diabetes Blood Sugar Emergencies: Your Action Plan. \"     If you do not have an account, please click on the \"Sign Up Now\" link. Current as of: December 20, 2019               Content Version: 12.6  © 6234-0488 [employer], Incorporated.    Care instructions adapted under license by [facility]. If you have questions about a medical condition or this instruction, always ask your healthcare professional. [place] [address] any warranty or liability for your use of this information. Patient Education        High Blood Pressure in Pregnancy: Care Instructions  Your Care Instructions     High blood pressure (hypertension) means that the force of blood against your artery walls is too strong. Mild high blood pressure during pregnancy is not usually dangerous. Your doctor will probably just want to watch you closely. But when blood pressure is very high, it can reduce oxygen to your baby. This can affect how well your baby grows. High blood pressure also means that you are at higher risk for:  · Preeclampsia. This is a problem that includes high blood pressure and damage to your liver or kidneys. It can also reduce how much oxygen your baby gets. In some cases, it leads to eclampsia. Eclampsia causes seizures. · Placental abruption. This is a problem when the placenta separates from the uterus before birth. It prevents the baby from getting enough oxygen and nutrients. Sometimes it can cause death for the baby and the mother. To prevent problems for you or your baby, you will have to check your blood pressure often. You will do this until after your baby is born. If your blood pressure rises suddenly or is very high during your pregnancy, your doctor may prescribe medicines. They can usually control blood pressure. If your blood pressure affects your or your baby's health, your doctor may need to deliver your baby early. After your baby is born, your blood pressure will probably improve. But sometimes blood pressure problems continue after birth. Follow-up care is a key part of your treatment and safety. Be sure to make and go to all appointments, and call your doctor if you are having problems.  It's also a good idea to know your test results and keep a list of the medicines you take. How can you care for yourself at home? · Take and write down your blood pressure at home if your doctor says to. · Take your medicines exactly as prescribed. Call your doctor if you think you are having a problem with your medicine. · Do not smoke. This is one of the best things you can do to help your baby be healthy. If you need help quitting, talk to your doctor about stop-smoking programs and medicines. These can increase your chances of quitting for good. · Don't gain too much weight during pregnancy. Talk to your doctor about how much weight gain is healthy. · Eat a healthy diet. · If your doctor says it's okay, get regular exercise. Walking or swimming several times a week can be healthy for you and your baby. · Reduce stress, and find time to relax. This is very important if you continue to work or have a busy schedule. It's also important if you have small children at home. When should you call for help? Call 911 anytime you think you may need emergency care. For example, call if:    · You passed out (lost consciousness).     · You have a seizure. Call your doctor now or seek immediate medical care if:    · You have symptoms of preeclampsia, such as:  ? Sudden swelling of your face, hands, or feet. ? New vision problems (such as dimness, blurring, or seeing spots). ? A severe headache.     · Your blood pressure is higher than it should be or rises suddenly.     · You have new nausea or vomiting.     · You think that you are in labor.     · You have pain in your belly or pelvis. Watch closely for changes in your health, and be sure to contact your doctor if:    · You gain weight rapidly. Where can you learn more? Go to https://Gazillion Entertainmentmattheweb.ONtheAIR. org and sign in to your Semantics3 account. Enter M334 in the [employer] home [employer]ations box to learn more about \"High Blood Pressure in Pregnancy: Care Instructions. \"     If you do not have an account, please click on the \"Sign Up

## 2021-01-29 NOTE — PROGRESS NOTES
Nst completed. Baseline 135  with moderate variabilty+ accelelrations.  irreg ctx noted Reactive per dr Salo Galvin

## 2021-01-31 NOTE — PROGRESS NOTES
OFFICE VISIT 1.14.21        [facility] MATERNAL FETAL MEDICINE  [address] [place] [address].  [address] [place] [address]. [address]  Ph: [phone]    Fax: [phone]   January 14, 2021     RE: Kathy Robins  [birth year]  Dear Dr. Joya Reyes : Thank you for sending  Ms. Arredondo for consultation and followup ultrasound in our office on  1.14.21    REASON FOR CONSULTATION:   ·           Preexisting Hypertension o10.0  - on Labetalol 300mg TID  ·           Family history of inheritable trait o35.2   ·           High risk pregnancy o09.89  ·           Obesity o99.21 - BMI 52  ·           Abn Screen result o28.9  -  Marleny Oyster  Fragile X   ·           Fetal Anatomy Survey z36  ·            Breech o32.1   Her chart was reviewed, her medical, surgical , and OBG history was examined along with any supporting documents available to me . ·           Her recent clinical visits and notes were reviewed. ·           ~Hospital Eval-  palpitations 12. 2.2020. EKG, Labs, Monitoring all OK   ·           Her recent laboratory and pathology  testing was reviewed  ·           Hemoglobinopathy RBC screen reassuring   ·           Quad screen reassuring   ·           Progenity screen  negative for 28/29 diseases -gray zone Fragile X      Review of Systems :   ·           CONSTITUTIONAL: No fever, no chills , no undue aches, pain   ·           HEENT: No headache, no visual changes, no sore throat . No loss of smell, taste  ·           PULM: No dyspnea, no cough  ·           CARDIO:  No chest pains, no more palpitations  ·           GI: No N/V, no D/C, no diarrhea, no abdominal pain   ·            : No dysuria, no vaginal bleeding or fluid leaking or discharge   ·           She reports good fetal movement   PHYSICAL EXAMINATION: VSS - Afebrile  BMI 52.6  ·           General Appearance: Healthy looking, alert , no acute distress. ·           Eyes: No pallor, no icterus, no photophobia. ·           Back: No CVA tenderness.  Lower back pain   · Abdomen: Soft, non-tender. ·           Extremities: No pretibial pitting edema . Leg pain/ sciatica, join pains      An ultrasound evaluation was done today. Please refer to the enclosed copy of the ultrasound report for further detailed information. ULTRASOUND IMPRESSION:    · Within developmental and technical limits of ultrasound assessment,   · ? Vertex Female fetus @  34w 4d  · ? Active, responsive baby. The amniotic fluid volume appears normal.   · ? The fetus is measuring appropriate for gestational age  · §                      36g (5:4) 53%ile. · ? There has been very good interval growth and development . ? ? The biophysical profile is reassuring with a score of 10/10   ? Umbilical artery Doppler studies are somewhat elevated resistance,  but overall reassuring with good end-diastolic flow. ? She had a reactive, reassuring NST. ? Fetal Heart Rate Baseline 140bpm  ? Accelerations present , with no decelerations noted . ? Moderate variability  ? She noted fetal movement, no cramping or contractions . · Patient notes fetal movement, no cramping or contraction. No undue tenderness or distress during exam.      DISCUSSION : Overall favorable report today. ·           The patient's fragile X carrier screen was reported as being in the gray zone. The test indicates that the patient is not a carrier of a fragile X expansion mutation. ? There is no known risk for her to have an affected pregnancy, however minor increases or decreases in the size of this mutation may occur when past to her project any such that the offspring could inherit a small pre-mutation, which could then carry down to her grandchildren , who could possibly be affected. ·           WE discussed the ACMG and ACOG recommendations that diagnostic fetal testing ( amniocentesis ) be offered if mom is the carrier and has > 55repeats . ?           Sol has 48 and 30 -  ( Not recommended)  ·           We discussed the risks inherent with her size as well . ·           We discussed the pros and cons of  testing- and discussed why her daughter is not at risk, and perhaps this testing would best be deferred until she is ready to start her own family and can make that decision for herself. RECOMMENDATIONS:  ?          Third  trimester concerns and precautions reviewed with patient. Discussed fetal movements and the role of  testing to help optimize growth and development and help predict/ avoid stress. Discussed trouble signs to watch for.  ?          The patient is to continue to follow with you in your office for ongoing obstetric care. ?          Work/ life / pregnancy priorities, goals,  concerns and precautions reviewed with patient. ?       Off Work   ? We have scheduled further [facility] visits today. 2021  ? She is to call if she has any problems or questions prior to her next visit. ? Further evaluation and management will be dependent on her clinical presentation and the results of her testing. ?          Once again, thank you for allowing us to participate in the care of this patient and if we can be of any further assistance to you, please do not hesitate to contact us. ? Sincerely,        Katherine Rodarte MD  I spent 16 minutes with the patient of which greater than 50% of the time was used to  the patient, discuss complications and problems related to her pregnancy, or coordinating her care.  I answered all of her questions to her satisfaction

## 2021-02-01 NOTE — PROGRESS NOTES
OFFICE VISIT 1.14.21        [facility] MATERNAL FETAL MEDICINE  [address] [place] [address].  [address] [place] [address]. [address]  Ph: [phone]    Fax: [phone]   January 29, 2021     RE: Orville Tinajero  [birth year]  Dear Dr. Eugenia Georges : Thank you for sending  Ms. Arredondo for consultation and followup ultrasound in our office on  1.29.21    REASON FOR CONSULTATION:   ·           Preexisting Hypertension o10.0  - on Labetalol 300mg TID  ·           Family history of inheritable trait o35.2   ·           High risk pregnancy o09.89  ·           Obesity o99.21 - BMI 52  ·           Abn Screen result o28.9  -  Xiomara Bees  Fragile X   ·           Fetal Anatomy Survey z36  ·           Gestational Diabetes- Diet   ~Pt states her glucose  measures within desired range, with no worrisome highs/ lows     Her chart was reviewed, her medical, surgical , and OBG history was examined along with any supporting documents available to me . ·           Her recent clinical visits and notes were reviewed. ·           ~Hospital Eval-  palpitations 12. 2.2020. EKG, Labs, Monitoring all OK   ·           Her recent laboratory and pathology  testing was reviewed  ·           Hemoglobinopathy RBC screen reassuring   ·           Quad screen reassuring   ·           Progenity screen  negative for 28/29 diseases -gray zone Fragile X      Review of Systems :   ·           CONSTITUTIONAL: No fever, no chills , no undue aches, pain   ·           HEENT: No headache, no visual changes, no sore throat . No loss of smell, taste  ·           PULM: No dyspnea, no cough  ·           CARDIO:  No chest pains, no more palpitations  ·           GI: No N/V, no D/C, no diarrhea, no abdominal pain   ·            : No dysuria, no vaginal bleeding or fluid leaking or discharge   ·           She reports good fetal movement   PHYSICAL EXAMINATION: VSS - Afebrile  BMI 51.31  ·           General Appearance: Healthy looking, alert , no acute distress.     ·           Eyes: amniocentesis ) be offered if mom is the carrier and has > 55repeats . ? Sol has 48 and 30 -  ( Not recommended)  ·           We discussed the risks inherent with her size as well . ·           We discussed the pros and cons of  testing- and discussed why her daughter is not at risk, and perhaps this testing would best be deferred until she is ready to start her own family and can make that decision for herself. RECOMMENDATIONS:  ?          Third  trimester concerns and precautions reviewed with patient. Discussed fetal movements and the role of  testing to help optimize growth and development and help predict/ avoid stress. Discussed trouble signs to watch for.  ?          The patient is to continue to follow with you in your office for ongoing obstetric care. ?          Work/ life / pregnancy priorities, goals,  concerns and precautions reviewed with patient. ?       Off Work   ? We have scheduled further [facility] visits today. ..  ? She is to call if she has any problems or questions prior to her next visit. ? Further evaluation and management will be dependent on her clinical presentation and the results of her testing. ?          Once again, thank you for allowing us to participate in the care of this patient and if we can be of any further assistance to you, please do not hesitate to contact us. ? Sincerely,        Joelle Jasso MD  I spent 16 minutes with the patient of which greater than 50% of the time was used to  the patient, discuss complications and problems related to her pregnancy, or coordinating her care.  I answered all of her questions to her satisfaction

## 2021-02-05 ENCOUNTER — HOSPITAL ENCOUNTER (INPATIENT)
Age: 31
LOS: 4 days | Discharge: HOME | End: 2021-02-09
Attending: OBSTETRICS & GYNECOLOGY | Admitting: OBSTETRICS & GYNECOLOGY
Payer: COMMERCIAL

## 2021-02-05 ENCOUNTER — ANCILLARY PROCEDURE (OUTPATIENT)
Dept: OBGYN CLINIC | Age: 31
End: 2021-02-05
Payer: COMMERCIAL

## 2021-02-05 ENCOUNTER — ANESTHESIA (OUTPATIENT)
Dept: LABOR AND DELIVERY | Age: 31
End: 2021-02-05
Payer: COMMERCIAL

## 2021-02-05 ENCOUNTER — ROUTINE PRENATAL (OUTPATIENT)
Dept: OBGYN CLINIC | Age: 31
End: 2021-02-05
Payer: COMMERCIAL

## 2021-02-05 ENCOUNTER — ANESTHESIA EVENT (OUTPATIENT)
Dept: LABOR AND DELIVERY | Age: 31
End: 2021-02-05
Payer: COMMERCIAL

## 2021-02-05 VITALS
WEIGHT: 293 LBS | HEIGHT: 67 IN | BODY MASS INDEX: 45.99 KG/M2 | DIASTOLIC BLOOD PRESSURE: 78 MMHG | HEART RATE: 86 BPM | SYSTOLIC BLOOD PRESSURE: 128 MMHG | TEMPERATURE: 97.2 F

## 2021-02-05 DIAGNOSIS — O10.919 CHRONIC HYPERTENSION IN PREGNANCY: ICD-10-CM

## 2021-02-05 DIAGNOSIS — G89.18 POST-OP PAIN: Primary | ICD-10-CM

## 2021-02-05 DIAGNOSIS — O24.410 DIET CONTROLLED GESTATIONAL DIABETES MELLITUS (GDM), ANTEPARTUM: Primary | ICD-10-CM

## 2021-02-05 DIAGNOSIS — Z3A.37 37 WEEKS GESTATION OF PREGNANCY: ICD-10-CM

## 2021-02-05 PROBLEM — Z34.90 ENCOUNTER FOR INDUCTION OF LABOR: Status: ACTIVE | Noted: 2021-02-05

## 2021-02-05 LAB
ABO/RH: NORMAL
ALBUMIN SERPL-MCNC: 3.6 G/DL (ref 3.5–5.2)
ALP BLD-CCNC: 116 U/L (ref 35–104)
ALT SERPL-CCNC: 16 U/L (ref 0–32)
AMPHETAMINE SCREEN, URINE: NOT DETECTED
ANION GAP SERPL CALCULATED.3IONS-SCNC: 11 MMOL/L (ref 7–16)
ANTIBODY SCREEN: NORMAL
APTT: 27.7 SEC (ref 24.5–35.1)
AST SERPL-CCNC: 15 U/L (ref 0–31)
BARBITURATE SCREEN URINE: NOT DETECTED
BENZODIAZEPINE SCREEN, URINE: NOT DETECTED
BILIRUB SERPL-MCNC: 0.3 MG/DL (ref 0–1.2)
BUN BLDV-MCNC: 7 MG/DL (ref 6–20)
CALCIUM SERPL-MCNC: 9.5 MG/DL (ref 8.6–10.2)
CANNABINOID SCREEN URINE: NOT DETECTED
CHLORIDE BLD-SCNC: 101 MMOL/L (ref 98–107)
CO2: 22 MMOL/L (ref 22–29)
COCAINE METABOLITE SCREEN URINE: NOT DETECTED
CREAT SERPL-MCNC: 0.6 MG/DL (ref 0.5–1)
D DIMER: 426 NG/ML DDU
FENTANYL SCREEN, URINE: POSITIVE
FIBRINOGEN: >700 MG/DL (ref 225–540)
GFR AFRICAN AMERICAN: >60
GFR NON-AFRICAN AMERICAN: >60 ML/MIN/1.73
GLUCOSE BLD-MCNC: 80 MG/DL (ref 74–99)
GLUCOSE URINE, POC: NEGATIVE
HCT VFR BLD CALC: 36.9 % (ref 34–48)
HEMOGLOBIN: 11.9 G/DL (ref 11.5–15.5)
INR BLD: 1
Lab: ABNORMAL
MCH RBC QN AUTO: 26.7 PG (ref 26–35)
MCHC RBC AUTO-ENTMCNC: 32.2 % (ref 32–34.5)
MCV RBC AUTO: 82.9 FL (ref 80–99.9)
METHADONE SCREEN, URINE: NOT DETECTED
OPIATE SCREEN URINE: NOT DETECTED
OXYCODONE URINE: NOT DETECTED
PDW BLD-RTO: 15.8 FL (ref 11.5–15)
PHENCYCLIDINE SCREEN URINE: NOT DETECTED
PLATELET # BLD: 253 E9/L (ref 130–450)
PMV BLD AUTO: 11.7 FL (ref 7–12)
POTASSIUM SERPL-SCNC: 3.7 MMOL/L (ref 3.5–5)
PROTEIN UA: NEGATIVE
PROTHROMBIN TIME: 11.3 SEC (ref 9.3–12.4)
RBC # BLD: 4.45 E12/L (ref 3.5–5.5)
SODIUM BLD-SCNC: 134 MMOL/L (ref 132–146)
TOTAL PROTEIN: 6.7 G/DL (ref 6.4–8.3)
URIC ACID, SERUM: 4.8 MG/DL (ref 2.4–5.7)
WBC # BLD: 12.2 E9/L (ref 4.5–11.5)

## 2021-02-05 PROCEDURE — 86901 BLOOD TYPING SEROLOGIC RH(D): CPT

## 2021-02-05 PROCEDURE — 76820 UMBILICAL ARTERY ECHO: CPT | Performed by: OBSTETRICS & GYNECOLOGY

## 2021-02-05 PROCEDURE — 81002 URINALYSIS NONAUTO W/O SCOPE: CPT | Performed by: OBSTETRICS & GYNECOLOGY

## 2021-02-05 PROCEDURE — 85027 COMPLETE CBC AUTOMATED: CPT

## 2021-02-05 PROCEDURE — 85610 PROTHROMBIN TIME: CPT

## 2021-02-05 PROCEDURE — 85378 FIBRIN DEGRADE SEMIQUANT: CPT

## 2021-02-05 PROCEDURE — 76818 FETAL BIOPHYS PROFILE W/NST: CPT | Performed by: OBSTETRICS & GYNECOLOGY

## 2021-02-05 PROCEDURE — 80053 COMPREHEN METABOLIC PANEL: CPT

## 2021-02-05 PROCEDURE — 84550 ASSAY OF BLOOD/URIC ACID: CPT

## 2021-02-05 PROCEDURE — 80307 DRUG TEST PRSMV CHEM ANLYZR: CPT

## 2021-02-05 PROCEDURE — 86850 RBC ANTIBODY SCREEN: CPT

## 2021-02-05 PROCEDURE — 6370000000 HC RX 637 (ALT 250 FOR IP): Performed by: OBSTETRICS & GYNECOLOGY

## 2021-02-05 PROCEDURE — 85384 FIBRINOGEN ACTIVITY: CPT

## 2021-02-05 PROCEDURE — 99213 OFFICE O/P EST LOW 20 MIN: CPT | Performed by: OBSTETRICS & GYNECOLOGY

## 2021-02-05 PROCEDURE — 85730 THROMBOPLASTIN TIME PARTIAL: CPT

## 2021-02-05 PROCEDURE — 36415 COLL VENOUS BLD VENIPUNCTURE: CPT

## 2021-02-05 PROCEDURE — 86900 BLOOD TYPING SEROLOGIC ABO: CPT

## 2021-02-05 PROCEDURE — 76815 OB US LIMITED FETUS(S): CPT | Performed by: OBSTETRICS & GYNECOLOGY

## 2021-02-05 PROCEDURE — 99999 PR OFFICE/OUTPT VISIT,PROCEDURE ONLY: CPT | Performed by: OBSTETRICS & GYNECOLOGY

## 2021-02-05 PROCEDURE — G0480 DRUG TEST DEF 1-7 CLASSES: HCPCS

## 2021-02-05 PROCEDURE — 1220000001 HC SEMI PRIVATE L&D R&B

## 2021-02-05 RX ORDER — DOCUSATE SODIUM 100 MG/1
100 CAPSULE, LIQUID FILLED ORAL 2 TIMES DAILY
Status: DISCONTINUED | OUTPATIENT
Start: 2021-02-05 | End: 2021-02-07

## 2021-02-05 RX ORDER — SODIUM CHLORIDE, SODIUM LACTATE, POTASSIUM CHLORIDE, CALCIUM CHLORIDE 600; 310; 30; 20 MG/100ML; MG/100ML; MG/100ML; MG/100ML
INJECTION, SOLUTION INTRAVENOUS CONTINUOUS
Status: DISCONTINUED | OUTPATIENT
Start: 2021-02-05 | End: 2021-02-07

## 2021-02-05 RX ORDER — SODIUM CHLORIDE 0.9 % (FLUSH) 0.9 %
10 SYRINGE (ML) INJECTION PRN
Status: DISCONTINUED | OUTPATIENT
Start: 2021-02-05 | End: 2021-02-07

## 2021-02-05 RX ORDER — ONDANSETRON 2 MG/ML
4 INJECTION INTRAMUSCULAR; INTRAVENOUS EVERY 6 HOURS PRN
Status: DISCONTINUED | OUTPATIENT
Start: 2021-02-05 | End: 2021-02-07

## 2021-02-05 RX ORDER — SODIUM CHLORIDE 0.9 % (FLUSH) 0.9 %
10 SYRINGE (ML) INJECTION EVERY 12 HOURS SCHEDULED
Status: DISCONTINUED | OUTPATIENT
Start: 2021-02-05 | End: 2021-02-07

## 2021-02-05 RX ADMIN — Medication 25 MCG: at 18:29

## 2021-02-05 RX ADMIN — Medication 25 MCG: at 22:47

## 2021-02-05 RX ADMIN — LABETALOL HYDROCHLORIDE 300 MG: 200 TABLET, FILM COATED ORAL at 18:26

## 2021-02-05 ASSESSMENT — LIFESTYLE VARIABLES: SMOKING_STATUS: 0

## 2021-02-05 NOTE — H&P
Medications Prior to Admission:  Medications Prior to Admission: Lancets MISC, 4 times daily. Do fasting blood sugar+ test blood sugar 2 hours after each meal  blood glucose monitor strips, Use In Vitro route 4 times daily. Do fasting blood sugar+ test blood sugar 2 hours after each meal  glucose monitoring kit (FREESTYLE) monitoring kit, Dispense one meter  pantoprazole (PROTONIX) 20 MG tablet,   LABETALOL HCL PO, Take 300 mg by mouth 3 times daily   docusate sodium (COLACE) 100 MG capsule, Take 100 mg by mouth daily   Prenatal Vit-Fe Fumarate-FA (PRENATAL PO), Take by mouth  Allergies:  Patient has no known allergies. Review of Systems:   Ears, nose, mouth, throat, and face: negative  Respiratory: negative  Cardiovascular: negative  Gastrointestinal: negative  Genitourinary:negative  Integument/breast: negative  Hematologic/lymphatic: negative  Musculoskeletal:negative  Neurological: negative  Behavioral/Psych: negative  Endocrine: negative  Allergic/Immunologic: negative    PHYSICAL EXAM:    General appearance:  awake, alert, cooperative, no apparent distress, and appears stated age  Neurologic:  Awake, alert, oriented to name, place and time. Cranial nerves II-XII are grossly intact. Motor is 5 out of 5 bilaterally. Cerebellar finger to nose, heel to shin intact. Sensory is intact.   Babinski down going, Romberg negative, and gait is normal.  Lungs:  No increased work of breathing, good air exchange, clear to auscultation bilaterally, no crackles or wheezing  Heart:  Normal apical impulse, regular rate and rhythm, normal S1 and S2, no S3 or S4, and no murmur noted  Abdomen:  No scars, normal bowel sounds, soft, non-distended, non-tender, no masses palpated, no hepatosplenomegally  Fetal heart rate:  Baseline Heart Rate 140, accelerations:  present, decelerations:  absnet  Pelvis:  External Genitalia: General appearance; normal, Hair distribution; normal, Lesions absent  Cervix:    DILATION:  clcm EFFACEMENT:   50%  STATION:  -3cm      Contraction frequency:  0 minutes  Membranes:  intact    LMP 05/17/2020     General Labs:    CBC:   Lab Results   Component Value Date    WBC 11.7 01/27/2021    RBC 4.37 01/27/2021    HGB 11.8 01/27/2021    HCT 36.6 01/27/2021    MCV 83.8 01/27/2021    RDW 15.4 01/27/2021     01/27/2021     CMP:    Lab Results   Component Value Date     01/27/2021    K 3.8 01/27/2021    K 3.9 12/02/2020     01/27/2021    CO2 21 01/27/2021    BUN 12 01/27/2021    PROT 6.7 01/27/2021     U/A:  No components found for: JEREMIAS Lafleur, Jeanette Rutledge, ANGEL, UBILI, UBLOOD, Heri, [place], [place], [facility], [place], [place], [place], [facility], [place]    ASSESSMENT AND PLAN:  IUP at 40 5/7 weeks  IOL per MFM = HTN  Dr. Jana Daniels notified      Electronically signed by Boogie Ramesh MD on 2/5/2021 at 2:24 PM

## 2021-02-05 NOTE — PROGRESS NOTES
Fetal movement slowing down but seems that movement is related to her diet   No concerns   Glucose log scanned

## 2021-02-05 NOTE — ANESTHESIA PRE PROCEDURE
Department of Anesthesiology  Preprocedure Note       Name:  Ariel Foster   Age:  27 y.o.  :  1990                                          MRN:  [id number]         Date:  2021      Surgeon: * No surgeons listed *    Procedure: * No procedures listed *    Medications prior to admission:   Prior to Admission medications    Medication Sig Start Date End Date Taking? Authorizing Provider   pantoprazole (PROTONIX) 20 MG tablet  20  Yes Historical Provider, MD   LABETALOL HCL PO Take 300 mg by mouth 3 times daily    Yes Historical Provider, MD   docusate sodium (COLACE) 100 MG capsule Take 100 mg by mouth daily    Yes Historical Provider, MD   Prenatal Vit-Fe Fumarate-FA (PRENATAL PO) Take by mouth   Yes Historical Provider, MD   Lancets MISC 4 times daily. Do fasting blood sugar+ test blood sugar 2 hours after each meal 21   Philipp Pettit MD   blood glucose monitor strips Use In Vitro route 4 times daily. Do fasting blood sugar+ test blood sugar 2 hours after each meal 21   Philipp Pettit MD   glucose monitoring kit (FREESTYLE) monitoring kit Dispense one meter 21   Philipp Pettit MD       Current medications:    No current facility-administered medications for this encounter.         Allergies:  No Known Allergies    Problem List:    Patient Active Problem List   Diagnosis Code    23 weeks gestation of pregnancy Z3A.23    Palpitations with regular cardiac rhythm R00.2    28 weeks gestation of pregnancy Z3A.28    32 weeks gestation of pregnancy Z3A.32    Primiparity, third trimester Z34.03    35 weeks gestation of pregnancy Z3A.35    Diet controlled gestational diabetes mellitus (GDM), antepartum O20.18    Pregnancy with 39 completed weeks gestation Z3A.36    Elevated blood pressure affecting pregnancy in third trimester, antepartum O16.3    36 weeks gestation of pregnancy Z3A.36    37 weeks gestation of pregnancy Z3A.37       Past Medical History: Diagnosis Date    Diet controlled gestational diabetes mellitus (GDM), antepartum 1/22/2021    Hypertension     PCOS (polycystic ovarian syndrome)        Past Surgical History:        Procedure Laterality Date    APPENDECTOMY  2013       Social History:    Social History     Tobacco Use    Smoking status: Never Smoker    Smokeless tobacco: Never Used   Substance Use Topics    Alcohol use: Not Currently                                Counseling given: Not Answered      Vital Signs (Current):   Vitals:    02/05/21 1515 02/05/21 1517   BP:  128/64   Pulse:  85   Resp:  16   Temp:  36.7 °C (98.1 °F)   TempSrc:  Oral   Weight: (!) 330 lb (149.7 kg)    Height: 5' 7\" (1.702 m)                                               BP Readings from Last 3 Encounters:   02/05/21 128/64   02/05/21 128/78   01/29/21 131/81       NPO Status:                                                                                 BMI:   Wt Readings from Last 3 Encounters:   02/05/21 (!) 330 lb (149.7 kg)   02/05/21 (!) 330 lb 9.6 oz (150 kg)   01/29/21 (!) 327 lb 9.6 oz (148.6 kg)     Body mass index is 51.69 kg/m². CBC:   Lab Results   Component Value Date    WBC 11.7 01/27/2021    RBC 4.37 01/27/2021    HGB 11.8 01/27/2021    HCT 36.6 01/27/2021    MCV 83.8 01/27/2021    RDW 15.4 01/27/2021     01/27/2021       CMP:   Lab Results   Component Value Date     01/27/2021    K 3.8 01/27/2021    K 3.9 12/02/2020     01/27/2021    CO2 21 01/27/2021    BUN 12 01/27/2021    CREATININE 0.6 01/27/2021    GFRAA >60 01/27/2021    LABGLOM >60 01/27/2021    GLUCOSE 139 01/27/2021    PROT 6.7 01/27/2021    CALCIUM 9.2 01/27/2021    BILITOT 0.3 01/27/2021    ALKPHOS 104 01/27/2021    AST 23 01/27/2021    ALT 21 01/27/2021       POC Tests: No results for input(s): POCGLU, POCNA, POCK, POCCL, POCBUN, POCHEMO, POCHCT in the last 72 hours.     Coags: No results found for: PROTIME, INR, APTT CO2 21 01/27/2021    BUN 12 01/27/2021    CREATININE 0.6 01/27/2021    GFRAA >60 01/27/2021    LABGLOM >60 01/27/2021    GLUCOSE 139 01/27/2021    PROT 6.7 01/27/2021    CALCIUM 9.2 01/27/2021    BILITOT 0.3 01/27/2021    ALKPHOS 104 01/27/2021    AST 23 01/27/2021    ALT 21 01/27/2021     BMP    Lab Results   Component Value Date     01/27/2021    K 3.8 01/27/2021    K 3.9 12/02/2020     01/27/2021    CO2 21 01/27/2021    BUN 12 01/27/2021    CREATININE 0.6 01/27/2021    CALCIUM 9.2 01/27/2021    GFRAA >60 01/27/2021    LABGLOM >60 01/27/2021    GLUCOSE 139 01/27/2021     POCGlucose  No results for input(s): GLUCOSE in the last 72 hours.      Coags  No results found for: PROTIME, INR, APTT    HCG (If Applicable) No results found for: PREGTESTUR, PREGSERUM, HCG, HCGQUANT     ABGs   No results found for: PHART, PO2ART, SXG7YXD, JXX3HTM, BEART, R8BLVVLF     Type & Screen (If Applicable)  Lab Results   Component Value Date    ABORH O POS 07/17/2020     Active Problem List with ICD10 Codes  Patient Active Problem List   Diagnosis Code    23 weeks gestation of pregnancy Z3A.23    Palpitations with regular cardiac rhythm R00.2    28 weeks gestation of pregnancy Z3A.28    32 weeks gestation of pregnancy Z3A.32    Primiparity, third trimester Z34.03    35 weeks gestation of pregnancy Z3A.35    Diet controlled gestational diabetes mellitus (GDM), antepartum O20.18    Pregnancy with 39 completed weeks gestation Z3A.36    Elevated blood pressure affecting pregnancy in third trimester, antepartum O16.3    36 weeks gestation of pregnancy Z3A.36    37 weeks gestation of pregnancy Z3A.37       HIGINIO Leyva - ALYSON  February 5, 2021  3:58 PM      HIGINIO Leyva - ALYSON   2/5/2021

## 2021-02-05 NOTE — PATIENT INSTRUCTIONS
Please arrive for your scheduled appointment at least 15 minutes early with your actual insurance card+ a photo ID. Also if you need any refills ordered or have questions, it may take up 48 hours to reply. Please allow ample time for your refills. Call me when you use last refill. Thank you for your cooperation. Any questions contact Luis at [phone]. If you are experiencing an emergency and need immediate help, call 911 or go to go emergency room or labor and delivery. if you are sick, not feeling well or have an infectious process going on please reschedule your appointment by calling [phone]. Also if any family members are not feeling well, please do not bring them to your appointment. We appreciate your cooperation. We are doing this in order to protect our pregnant mothers+ their babies. Call your primary obstetrician with bleeding, leaking of fluid, abdominal tenderness, headache, blurry vision, epigastric pain and increased urinary frequency. Do kick counts after dinner. Call your primary obstetrician if less than 10 kicks in 2 hours after dinner. Call your primary obstetrician with bleeding, leaking of fluid, abdominal tenderness, headache, blurry vision, epigastric pain and increased urinary frequency. You might be having an NST at your next appt. Please eat a large snack or breakfast before coming to office. Thank you  Patient Education        Week 40 of Your Pregnancy: Care Instructions  Your Care Instructions     You are near the end of your pregnancy--and you're probably pretty uncomfortable. It may be harder to walk around. Lying down probably isn't comfortable either. You may have trouble getting to sleep or staying asleep. Most women deliver their babies between 40 and 41 weeks. This is a good time to think about packing a bag for the hospital with items you'll need. Then you'll be ready when labor starts. Follow-up care is a key part of your treatment and safety.  Be sure to make and go to all appointments, and call your doctor if you are having problems. It's also a good idea to know your test results and keep a list of the medicines you take. How can you care for yourself at home? Learn about breastfeeding  · Breastfeeding is best for your baby and good for you. · Breast milk has antibodies to help your baby fight infections. · Mothers who breastfeed often lose weight faster, because making milk burns calories. · Learning the best ways to hold your baby will make breastfeeding easier. · Let your partner bathe and diaper the baby to keep your partner from feeling left out. Snuggle together when you breastfeed. · You may want to learn how to use a breast pump and store your milk. · If you choose to bottle feed, make the feeding feel like breastfeeding so you can bond with your baby. Always hold your baby and the bottle. Do not prop bottles or let your baby fall asleep with a bottle. Learn about crying  · It is common for babies to cry for 1 to 3 hours a day. Some cry more, some cry less. · Babies don't cry to make you upset or because you are a bad parent. · Crying is how your baby communicates. Your baby may be hungry; have gas; need a diaper change; or feel cold, warm, tired, lonely, or tense. Sometimes babies cry for unknown reasons. · If you respond to your baby's needs, he or she will learn to trust you. · Try to stay calm when your baby cries. Your baby may get more upset if he or she senses that you are upset. Know how to care for your   · Your baby's umbilical cord stump will drop off on its own, usually between 1 and 2 weeks. To care for your baby's umbilical cord area:  ? Clean the area at the bottom of the cord 2 or 3 times a day. ? Pay special attention to the area where the cord attaches to the skin. ? Keep the diaper folded below the cord. ? Use a damp washcloth or cotton ball to sponge bathe your baby until the stump has come off.   · Your baby's first dark stool is called meconium. After the meconium is passed, your baby will develop his or her own bowel pattern. ? Some babies, especially  babies, have several bowel movements a day. Others have one or two a day, or one every 2 to 3 days. ?  babies often have loose, yellow stools. Formula-fed babies have more formed stools. ? If your baby's stools look like little pellets, he or she is constipated. After 2 days of constipation, call your baby's doctor. · If your baby will be circumcised, you can care for him at home. ? Gently rinse his penis with warm water after every diaper change. Do not try to remove the film that forms on the penis. This film will go away on its own. Pat dry. ? Put petroleum ointment, such as Vaseline, on the area of the diaper that will touch your baby's penis. This will keep the diaper from sticking to your baby. ? Ask the doctor about giving your baby acetaminophen (Tylenol) for pain. Where can you learn more? Go to https://Federated Sample.Floobits. org and sign in to your BAASBOX account. Enter 68 21 [id number] in the Passado box to learn more about \"Week 37 of Your Pregnancy: Care Instructions. \"     If you do not have an account, please click on the \"Sign Up Now\" link. Current as of: February 11, 2020               Content Version: 12.6  © 1738-3127 BeHome247, Incorporated. Care instructions adapted under license by [facility] ([facility]). If you have questions about a medical condition or this instruction, always ask your healthcare professional. [place] [address] any warranty or liability for your use of this information. Patient Education        Learning About When to Call Your Doctor During Pregnancy (After 20 Weeks)  Your Care Instructions  It's common to have concerns about what might be a problem during pregnancy.  Although most pregnant women don't have any serious problems, it's important to know when to call your doctor if you that are less than 5 minutes apart. · Contractions that are hard to talk through. Follow-up care is a key part of your treatment and safety. Be sure to make and go to all appointments, and call your doctor if you are having problems. It's also a good idea to know your test results and keep a list of the medicines you take. Where can you learn more? Go to https://BOS Better On-Line SolutionspepicewHansen And Son.Clear-Data Analytics. org and sign in to your Seanodes account. Enter  in the Humble Bundle box to learn more about \"Learning About When to Call Your Doctor During Pregnancy (After 20 Weeks). \"     If you do not have an account, please click on the \"Sign Up Now\" link. Current as of: February 11, 2020               Content Version: 12.6  © 6329-8602 Zuppler, ReflexPhotonics. Care instructions adapted under license by [facility] ([facility]). If you have questions about a medical condition or this instruction, always ask your healthcare professional. [place] [address] any warranty or liability for your use of this information. Patient Education        Counting Your Baby's Kicks: Care Instructions  Your Care Instructions     Counting your baby's kicks is one way your doctor can tell that your baby is healthy. Most women--especially in a first pregnancy--feel their baby move for the first time between 16 and 22 weeks. The movement may feel like flutters rather than kicks. Your baby may move more at certain times of the day. When you are active, you may notice less kicking than when you are resting. At your prenatal visits, your doctor will ask whether the baby is active. In your last trimester, your doctor may ask you to count the number of times you feel your baby move. Follow-up care is a key part of your treatment and safety. Be sure to make and go to all appointments, and call your doctor if you are having problems. It's also a good idea to know your test results and keep a list of the medicines you take.   How do you count fetal kicks? · A common method of checking your baby's movement is to count the number of kicks or moves you feel in 1 hour. Ten movements (such as kicks, flutters, or rolls) in 1 hour are normal. Some doctors suggest that you count in the morning until you get to 10 movements. Then you can quit for that day and start again the next day. · Pick your baby's most active time of day to count. This may be any time from morning to evening. · If you do not feel 10 movements in an hour, your baby may be sleeping. Wait for the next hour and count again. When should you call for help? Call your doctor now or seek immediate medical care if:    · You noticed that your baby has stopped moving or is moving much less than normal.   Watch closely for changes in your health, and be sure to contact your doctor if you have any problems. Where can you learn more? Go to https://Conjur.GetIntent. org and sign in to your Astro account. Enter N452 in the Freightos box to learn more about \"Counting Your Baby's Kicks: Care Instructions. \"     If you do not have an account, please click on the \"Sign Up Now\" link. Current as of: February 11, 2020               Content Version: 12.6  © 2006-2020 Healthwise, Incorporated. Care instructions adapted under license by [facility] ([facility]). If you have questions about a medical condition or this instruction, always ask your healthcare professional. [place] [address] any warranty or liability for your use of this information. Patient Education        Diabetes Blood Sugar Emergencies: Your Action Plan  How can you prevent a blood sugar emergency? An important part of living with diabetes is keeping your blood sugar in your target range. You'll need to know what to do if it's too high or too low. Managing your blood sugar levels helps you avoid emergencies. This care sheet will teach you about the signs of high and low blood sugar.  It will help you make an action plan with your doctor for when these signs occur. Low blood sugar is more likely to happen if you take certain medicines for diabetes. It can also happen if you skip a meal, drink alcohol, or exercise more than usual.  You may get high blood sugar if you eat differently than you normally do. One example is eating more carbohydrate than usual. Having a cold, the flu, or other sudden illness can also cause high blood sugar levels. Levels can also rise if you miss a dose of medicine. Any change in how you take your medicine may affect your blood sugar level. So it's important to work with your doctor before you make any changes. Check your blood sugar  Work with your doctor to fill in the blank spaces below that apply to you. Track your levels, know your target range, and write down ways you can get your blood sugar back in your target range. A log book can help you track your levels. Take the book to all of your medical appointments. · Check your blood sugar _____ times a day, at these times:________________________________________________. (For example: Before meals, at bedtime, before exercise, during exercise, other.)  · Your blood sugar target range before a meal is ___________________. Your blood sugar target range after a meal is _______________________. · Do this--___________________________________________________--to get your blood sugar back within your safe range if your blood sugar results are _________________________________________. (For example: Less than 70 or above 250 mg/dL.)  Call your doctor when your blood sugar results are ___________________________________. (For example: Less than 70 or above 250 mg/dL.)  What are the symptoms of low and high blood sugar? Common symptoms of low blood sugar are sweating and feeling shaky, weak, hungry, or confused. Symptoms can start quickly. Common symptoms of high blood sugar are feeling very thirsty or very hungry.  You may also pass urine more often than usual. You may have blurry vision and may lose weight without trying. But some people may have high or low blood sugar without having any symptoms. That's a good reason to check your blood sugar on a regular schedule. What should you do if you have symptoms? Work with your doctor to fill in the blank spaces below that apply to you. Low blood sugar  If you have symptoms of low blood sugar, check your blood sugar. If it's below _____ ( for example, below 70), eat or drink a quick-sugar food that has about 15 grams of carbohydrate. Your goal is to get your level back to your safe range. Check your blood sugar again 15 minutes later. If it's still not in your target range, take another 15 grams of carbohydrate and check your blood sugar again in 15 minutes. Repeat this until you reach your target. Then go back to your regular testing schedule. Children usually need less than 15 grams of carbohydrate. Check with your doctor or diabetes educator for the amount that is right for your child. When you have low blood sugar, it's best to stop or reduce any physical activity until your blood sugar is back in your target range and is stable. If you must stay active, eat or drink 30 grams of carbohydrate. Then check your blood sugar again in 15 minutes. If it's not in your target range, take another 30 grams of carbohydrates. Check your blood sugar again in 15 minutes. Keep doing this until you reach your target. You can then go back to your regular testing schedule. If your symptoms or blood sugar levels are getting worse or have not improved after 15 minutes, seek medical care right away.    Here are some examples of quick-sugar foods with 15 grams of carbohydrate:  · 3 or 4 glucose tablets  · 1 tablespoon (3 teaspoons) table sugar  · ½ cup to ¾ cup (4 to 6 ounces) of fruit juice or regular (not diet) soda  · Hard candy (such as 6 Life Savers)  High blood sugar  If you have symptoms of high blood sugar, check your blood sugar. Your goal is to get your level back to your target range. If it's above ______ ( for example, above 250), follow these steps:  · If you missed a dose of your diabetes medicine, take it now. Take only the amount of medicine that you have been prescribed. Do not take more or less medicine. · Give yourself insulin if your doctor has prescribed it for high blood sugar. · Test for ketones, if the doctor told you to do so. If the results of the ketone test show a moderate-to-large amount of ketones, call the doctor for advice. · Wait 30 minutes after you take the extra insulin or the missed medicine. Check your blood sugar again. If your symptoms or blood sugar levels are getting worse or have not improved after taking these steps, seek medical care right away. Follow-up care is a key part of your treatment and safety. Be sure to make and go to all appointments, and call your doctor if you are having problems. It's also a good idea to know your test results and keep a list of the medicines you take. Where can you learn more? Go to https://RQx Pharmaceuticalspepiceweb.Ladies Who Launch. org and sign in to your Senic account. Enter R774 in the OmniLytics box to learn more about \"Diabetes Blood Sugar Emergencies: Your Action Plan. \"     If you do not have an account, please click on the \"Sign Up Now\" link. Current as of: December 20, 2019               Content Version: 12.6  © 2542-5811 Enteye, Incorporated. Care instructions adapted under license by [facility] ([facility]). If you have questions about a medical condition or this instruction, always ask your healthcare professional. [place] [address] any warranty or liability for your use of this information.          Patient Education        High Blood Pressure in Pregnancy: Care Instructions  Your Care Instructions     High blood pressure (hypertension) means that the force of blood against your artery walls is too medicines. These can increase your chances of quitting for good. · Don't gain too much weight during pregnancy. Talk to your doctor about how much weight gain is healthy. · Eat a healthy diet. · If your doctor says it's okay, get regular exercise. Walking or swimming several times a week can be healthy for you and your baby. · Reduce stress, and find time to relax. This is very important if you continue to work or have a busy schedule. It's also important if you have small children at home. When should you call for help? Call 911 anytime you think you may need emergency care. For example, call if:    · You passed out (lost consciousness).     · You have a seizure. Call your doctor now or seek immediate medical care if:    · You have symptoms of preeclampsia, such as:  ? Sudden swelling of your face, hands, or feet. ? New vision problems (such as dimness, blurring, or seeing spots). ? A severe headache.     · Your blood pressure is higher than it should be or rises suddenly.     · You have new nausea or vomiting.     · You think that you are in labor.     · You have pain in your belly or pelvis. Watch closely for changes in your health, and be sure to contact your doctor if:    · You gain weight rapidly. Where can you learn more? Go to https://turboBOTZ.MediaTrust. org and sign in to your flatev account. Enter E172 in the [facility] box to learn more about \"High Blood Pressure in Pregnancy: Care Instructions. \"     If you do not have an account, please click on the \"Sign Up Now\" link. Current as of: February 11, 2020               Content Version: 12.6  © 3111-7707 [place] , Incorporated. Care instructions adapted under license by [facility] ([facility]). If you have questions about a medical condition or this instruction, always ask your healthcare professional. [address] any warranty or liability for your use of this information.

## 2021-02-05 NOTE — PROGRESS NOTES
37w5d presented to unit from [facility] for induction of labor due to [facility]. Pt denies VB or LOF. Occassional ctx. + FM. Hx of HTN, GDM. Pt placed on EFM.

## 2021-02-06 PROCEDURE — 1220000001 HC SEMI PRIVATE L&D R&B

## 2021-02-06 PROCEDURE — 6370000000 HC RX 637 (ALT 250 FOR IP): Performed by: OBSTETRICS & GYNECOLOGY

## 2021-02-06 PROCEDURE — 6360000002 HC RX W HCPCS: Performed by: OBSTETRICS & GYNECOLOGY

## 2021-02-06 RX ADMIN — LABETALOL HYDROCHLORIDE 300 MG: 200 TABLET, FILM COATED ORAL at 00:16

## 2021-02-06 RX ADMIN — BUTORPHANOL TARTRATE 2 MG: 2 INJECTION, SOLUTION INTRAMUSCULAR; INTRAVENOUS at 23:43

## 2021-02-06 RX ADMIN — Medication 25 MCG: at 06:55

## 2021-02-06 RX ADMIN — Medication 1 MILLI-UNITS/MIN: at 17:15

## 2021-02-06 RX ADMIN — LABETALOL HYDROCHLORIDE 300 MG: 200 TABLET, FILM COATED ORAL at 23:43

## 2021-02-06 RX ADMIN — Medication 25 MCG: at 10:55

## 2021-02-06 RX ADMIN — LABETALOL HYDROCHLORIDE 300 MG: 200 TABLET, FILM COATED ORAL at 08:27

## 2021-02-06 RX ADMIN — BUTORPHANOL TARTRATE 2 MG: 2 INJECTION, SOLUTION INTRAMUSCULAR; INTRAVENOUS at 17:32

## 2021-02-06 RX ADMIN — Medication 25 MCG: at 02:47

## 2021-02-06 RX ADMIN — BUTORPHANOL TARTRATE 2 MG: 2 INJECTION, SOLUTION INTRAMUSCULAR; INTRAVENOUS at 04:24

## 2021-02-06 RX ADMIN — LABETALOL HYDROCHLORIDE 300 MG: 200 TABLET, FILM COATED ORAL at 16:38

## 2021-02-06 ASSESSMENT — PAIN SCALES - GENERAL
PAINLEVEL_OUTOF10: 3
PAINLEVEL_OUTOF10: 3

## 2021-02-06 ASSESSMENT — PAIN DESCRIPTION - PROGRESSION: CLINICAL_PROGRESSION: GRADUALLY WORSENING

## 2021-02-06 ASSESSMENT — PAIN - FUNCTIONAL ASSESSMENT: PAIN_FUNCTIONAL_ASSESSMENT: ACTIVITIES ARE NOT PREVENTED

## 2021-02-06 ASSESSMENT — PAIN DESCRIPTION - DESCRIPTORS: DESCRIPTORS: CRAMPING

## 2021-02-06 NOTE — PROGRESS NOTES
Assumed care of patient. Patient resting in bed. No needs at this time. Call light and belongings within reach.

## 2021-02-06 NOTE — PROGRESS NOTES
RN at bedside continuously adjusting monitor. Pt moved from chair to laying in bed on side.  FHT audible in 130's, FHT difficult to trace

## 2021-02-07 VITALS — SYSTOLIC BLOOD PRESSURE: 120 MMHG | DIASTOLIC BLOOD PRESSURE: 73 MMHG | OXYGEN SATURATION: 99 %

## 2021-02-07 PROBLEM — I10 ESSENTIAL HYPERTENSION: Status: ACTIVE | Noted: 2021-02-07

## 2021-02-07 PROCEDURE — 6370000000 HC RX 637 (ALT 250 FOR IP): Performed by: OBSTETRICS & GYNECOLOGY

## 2021-02-07 PROCEDURE — 51798 US URINE CAPACITY MEASURE: CPT

## 2021-02-07 PROCEDURE — 6360000002 HC RX W HCPCS: Performed by: OBSTETRICS & GYNECOLOGY

## 2021-02-07 PROCEDURE — 2500000003 HC RX 250 WO HCPCS: Performed by: ANESTHESIOLOGY

## 2021-02-07 PROCEDURE — 7100000000 HC PACU RECOVERY - FIRST 15 MIN: Performed by: OBSTETRICS & GYNECOLOGY

## 2021-02-07 PROCEDURE — 3700000025 EPIDURAL BLOCK: Performed by: ANESTHESIOLOGY

## 2021-02-07 PROCEDURE — 6360000002 HC RX W HCPCS: Performed by: NURSE ANESTHETIST, CERTIFIED REGISTERED

## 2021-02-07 PROCEDURE — 2580000003 HC RX 258: Performed by: OBSTETRICS & GYNECOLOGY

## 2021-02-07 PROCEDURE — 99222 1ST HOSP IP/OBS MODERATE 55: CPT | Performed by: FAMILY MEDICINE

## 2021-02-07 PROCEDURE — 2709999900 HC NON-CHARGEABLE SUPPLY: Performed by: OBSTETRICS & GYNECOLOGY

## 2021-02-07 PROCEDURE — 7100000001 HC PACU RECOVERY - ADDTL 15 MIN: Performed by: OBSTETRICS & GYNECOLOGY

## 2021-02-07 PROCEDURE — 3700000000 HC ANESTHESIA ATTENDED CARE: Performed by: OBSTETRICS & GYNECOLOGY

## 2021-02-07 PROCEDURE — 88307 TISSUE EXAM BY PATHOLOGIST: CPT

## 2021-02-07 PROCEDURE — 3700000001 HC ADD 15 MINUTES (ANESTHESIA): Performed by: OBSTETRICS & GYNECOLOGY

## 2021-02-07 PROCEDURE — 1220000000 HC SEMI PRIVATE OB R&B

## 2021-02-07 PROCEDURE — 2500000003 HC RX 250 WO HCPCS: Performed by: NURSE ANESTHETIST, CERTIFIED REGISTERED

## 2021-02-07 PROCEDURE — 3609079900 HC CESAREAN SECTION: Performed by: OBSTETRICS & GYNECOLOGY

## 2021-02-07 RX ORDER — FENTANYL CITRATE 50 UG/ML
25 INJECTION, SOLUTION INTRAMUSCULAR; INTRAVENOUS EVERY 5 MIN PRN
Status: DISCONTINUED | OUTPATIENT
Start: 2021-02-07 | End: 2021-02-09 | Stop reason: HOSPADM

## 2021-02-07 RX ORDER — PANTOPRAZOLE SODIUM 40 MG/1
40 TABLET, DELAYED RELEASE ORAL DAILY
Status: DISCONTINUED | OUTPATIENT
Start: 2021-02-07 | End: 2021-02-09 | Stop reason: HOSPADM

## 2021-02-07 RX ORDER — MORPHINE SULFATE 2 MG/ML
2 INJECTION, SOLUTION INTRAMUSCULAR; INTRAVENOUS EVERY 5 MIN PRN
Status: DISCONTINUED | OUTPATIENT
Start: 2021-02-07 | End: 2021-02-09 | Stop reason: HOSPADM

## 2021-02-07 RX ORDER — LABETALOL 100 MG/1
100 TABLET, FILM COATED ORAL EVERY 12 HOURS SCHEDULED
Status: DISCONTINUED | OUTPATIENT
Start: 2021-02-07 | End: 2021-02-07

## 2021-02-07 RX ORDER — DIPHENHYDRAMINE HYDROCHLORIDE 50 MG/ML
12.5 INJECTION INTRAMUSCULAR; INTRAVENOUS
Status: ACTIVE | OUTPATIENT
Start: 2021-02-07 | End: 2021-02-07

## 2021-02-07 RX ORDER — DOCUSATE SODIUM 100 MG/1
100 CAPSULE, LIQUID FILLED ORAL 2 TIMES DAILY
Status: DISCONTINUED | OUTPATIENT
Start: 2021-02-07 | End: 2021-02-09 | Stop reason: HOSPADM

## 2021-02-07 RX ORDER — MODIFIED LANOLIN
OINTMENT (GRAM) TOPICAL
Status: DISCONTINUED | OUTPATIENT
Start: 2021-02-07 | End: 2021-02-09 | Stop reason: HOSPADM

## 2021-02-07 RX ORDER — SODIUM CHLORIDE 0.9 % (FLUSH) 0.9 %
10 SYRINGE (ML) INJECTION PRN
Status: DISCONTINUED | OUTPATIENT
Start: 2021-02-07 | End: 2021-02-09 | Stop reason: HOSPADM

## 2021-02-07 RX ORDER — FENTANYL CITRATE 50 UG/ML
INJECTION, SOLUTION INTRAMUSCULAR; INTRAVENOUS PRN
Status: DISCONTINUED | OUTPATIENT
Start: 2021-02-07 | End: 2021-02-07 | Stop reason: SDUPTHER

## 2021-02-07 RX ORDER — CEPHALEXIN 500 MG/1
500 CAPSULE ORAL EVERY 8 HOURS SCHEDULED
Status: COMPLETED | OUTPATIENT
Start: 2021-02-07 | End: 2021-02-09

## 2021-02-07 RX ORDER — MORPHINE SULFATE 4 MG/ML
4 INJECTION, SOLUTION INTRAMUSCULAR; INTRAVENOUS
Status: DISCONTINUED | OUTPATIENT
Start: 2021-02-07 | End: 2021-02-09 | Stop reason: HOSPADM

## 2021-02-07 RX ORDER — SIMETHICONE 80 MG
80 TABLET,CHEWABLE ORAL EVERY 6 HOURS PRN
Status: DISCONTINUED | OUTPATIENT
Start: 2021-02-07 | End: 2021-02-09 | Stop reason: HOSPADM

## 2021-02-07 RX ORDER — LIDOCAINE HYDROCHLORIDE AND EPINEPHRINE 20; 5 MG/ML; UG/ML
INJECTION, SOLUTION EPIDURAL; INFILTRATION; INTRACAUDAL; PERINEURAL PRN
Status: DISCONTINUED | OUTPATIENT
Start: 2021-02-07 | End: 2021-02-07 | Stop reason: SDUPTHER

## 2021-02-07 RX ORDER — FERROUS SULFATE 325(65) MG
325 TABLET ORAL 2 TIMES DAILY WITH MEALS
Status: DISCONTINUED | OUTPATIENT
Start: 2021-02-07 | End: 2021-02-09 | Stop reason: HOSPADM

## 2021-02-07 RX ORDER — LABETALOL 200 MG/1
150 TABLET, FILM COATED ORAL EVERY 12 HOURS SCHEDULED
Status: DISCONTINUED | OUTPATIENT
Start: 2021-02-08 | End: 2021-02-08

## 2021-02-07 RX ORDER — PROMETHAZINE HYDROCHLORIDE 25 MG/ML
6.25 INJECTION, SOLUTION INTRAMUSCULAR; INTRAVENOUS
Status: ACTIVE | OUTPATIENT
Start: 2021-02-07 | End: 2021-02-07

## 2021-02-07 RX ORDER — PRENATAL WITH FERROUS FUM AND FOLIC ACID 3080; 920; 120; 400; 22; 1.84; 3; 20; 10; 1; 12; 200; 27; 25; 2 [IU]/1; [IU]/1; MG/1; [IU]/1; MG/1; MG/1; MG/1; MG/1; MG/1; MG/1; UG/1; MG/1; MG/1; MG/1; MG/1
1 TABLET ORAL DAILY
Status: DISCONTINUED | OUTPATIENT
Start: 2021-02-07 | End: 2021-02-09 | Stop reason: HOSPADM

## 2021-02-07 RX ORDER — TRISODIUM CITRATE DIHYDRATE AND CITRIC ACID MONOHYDRATE 500; 334 MG/5ML; MG/5ML
30 SOLUTION ORAL ONCE
Status: COMPLETED | OUTPATIENT
Start: 2021-02-07 | End: 2021-02-07

## 2021-02-07 RX ORDER — MEPERIDINE HYDROCHLORIDE 25 MG/ML
12.5 INJECTION INTRAMUSCULAR; INTRAVENOUS; SUBCUTANEOUS EVERY 5 MIN PRN
Status: DISCONTINUED | OUTPATIENT
Start: 2021-02-07 | End: 2021-02-07 | Stop reason: HOSPADM

## 2021-02-07 RX ORDER — MORPHINE SULFATE 2 MG/ML
2 INJECTION, SOLUTION INTRAMUSCULAR; INTRAVENOUS
Status: DISCONTINUED | OUTPATIENT
Start: 2021-02-07 | End: 2021-02-09 | Stop reason: HOSPADM

## 2021-02-07 RX ORDER — SODIUM CHLORIDE, SODIUM LACTATE, POTASSIUM CHLORIDE, CALCIUM CHLORIDE 600; 310; 30; 20 MG/100ML; MG/100ML; MG/100ML; MG/100ML
INJECTION, SOLUTION INTRAVENOUS CONTINUOUS
Status: DISCONTINUED | OUTPATIENT
Start: 2021-02-07 | End: 2021-02-09 | Stop reason: HOSPADM

## 2021-02-07 RX ORDER — METRONIDAZOLE 500 MG/1
500 TABLET ORAL EVERY 8 HOURS SCHEDULED
Status: DISCONTINUED | OUTPATIENT
Start: 2021-02-07 | End: 2021-02-09

## 2021-02-07 RX ORDER — SODIUM CHLORIDE, SODIUM LACTATE, POTASSIUM CHLORIDE, CALCIUM CHLORIDE 600; 310; 30; 20 MG/100ML; MG/100ML; MG/100ML; MG/100ML
INJECTION, SOLUTION INTRAVENOUS CONTINUOUS
Status: DISCONTINUED | OUTPATIENT
Start: 2021-02-07 | End: 2021-02-07

## 2021-02-07 RX ORDER — ACETAMINOPHEN 325 MG/1
650 TABLET ORAL EVERY 4 HOURS PRN
Status: DISCONTINUED | OUTPATIENT
Start: 2021-02-07 | End: 2021-02-09 | Stop reason: HOSPADM

## 2021-02-07 RX ORDER — KETOROLAC TROMETHAMINE 30 MG/ML
30 INJECTION, SOLUTION INTRAMUSCULAR; INTRAVENOUS EVERY 6 HOURS
Status: COMPLETED | OUTPATIENT
Start: 2021-02-07 | End: 2021-02-08

## 2021-02-07 RX ORDER — NALOXONE HYDROCHLORIDE 0.4 MG/ML
0.4 INJECTION, SOLUTION INTRAMUSCULAR; INTRAVENOUS; SUBCUTANEOUS PRN
Status: DISCONTINUED | OUTPATIENT
Start: 2021-02-07 | End: 2021-02-07

## 2021-02-07 RX ORDER — NALBUPHINE HCL 10 MG/ML
5 AMPUL (ML) INJECTION EVERY 4 HOURS PRN
Status: DISCONTINUED | OUTPATIENT
Start: 2021-02-07 | End: 2021-02-07

## 2021-02-07 RX ORDER — ONDANSETRON 2 MG/ML
4 INJECTION INTRAMUSCULAR; INTRAVENOUS EVERY 6 HOURS PRN
Status: DISCONTINUED | OUTPATIENT
Start: 2021-02-07 | End: 2021-02-09 | Stop reason: HOSPADM

## 2021-02-07 RX ORDER — SODIUM CHLORIDE 0.9 % (FLUSH) 0.9 %
10 SYRINGE (ML) INJECTION EVERY 12 HOURS SCHEDULED
Status: DISCONTINUED | OUTPATIENT
Start: 2021-02-07 | End: 2021-02-09 | Stop reason: HOSPADM

## 2021-02-07 RX ORDER — 0.9 % SODIUM CHLORIDE 0.9 %
250 INTRAVENOUS SOLUTION INTRAVENOUS
Status: DISCONTINUED | OUTPATIENT
Start: 2021-02-07 | End: 2021-02-07 | Stop reason: HOSPADM

## 2021-02-07 RX ORDER — ONDANSETRON 2 MG/ML
INJECTION INTRAMUSCULAR; INTRAVENOUS PRN
Status: DISCONTINUED | OUTPATIENT
Start: 2021-02-07 | End: 2021-02-07 | Stop reason: SDUPTHER

## 2021-02-07 RX ORDER — IBUPROFEN 800 MG/1
800 TABLET ORAL EVERY 8 HOURS PRN
Status: DISCONTINUED | OUTPATIENT
Start: 2021-02-07 | End: 2021-02-09 | Stop reason: HOSPADM

## 2021-02-07 RX ORDER — TRISODIUM CITRATE DIHYDRATE AND CITRIC ACID MONOHYDRATE 500; 334 MG/5ML; MG/5ML
30 SOLUTION ORAL ONCE
Status: DISCONTINUED | OUTPATIENT
Start: 2021-02-07 | End: 2021-02-07

## 2021-02-07 RX ORDER — OXYCODONE HYDROCHLORIDE AND ACETAMINOPHEN 5; 325 MG/1; MG/1
2 TABLET ORAL EVERY 4 HOURS PRN
Status: DISCONTINUED | OUTPATIENT
Start: 2021-02-07 | End: 2021-02-08

## 2021-02-07 RX ORDER — OXYCODONE HYDROCHLORIDE AND ACETAMINOPHEN 5; 325 MG/1; MG/1
1 TABLET ORAL EVERY 4 HOURS PRN
Status: DISCONTINUED | OUTPATIENT
Start: 2021-02-07 | End: 2021-02-08

## 2021-02-07 RX ORDER — BISACODYL 10 MG
10 SUPPOSITORY, RECTAL RECTAL DAILY PRN
Status: DISCONTINUED | OUTPATIENT
Start: 2021-02-07 | End: 2021-02-09 | Stop reason: HOSPADM

## 2021-02-07 RX ORDER — ONDANSETRON 4 MG/1
8 TABLET, ORALLY DISINTEGRATING ORAL EVERY 8 HOURS PRN
Status: DISCONTINUED | OUTPATIENT
Start: 2021-02-07 | End: 2021-02-09 | Stop reason: HOSPADM

## 2021-02-07 RX ADMIN — SODIUM CITRATE AND CITRIC ACID MONOHYDRATE 30 ML: 500; 334 SOLUTION ORAL at 12:36

## 2021-02-07 RX ADMIN — Medication 15 ML/HR: at 09:26

## 2021-02-07 RX ADMIN — ONDANSETRON 4 MG: 2 INJECTION INTRAMUSCULAR; INTRAVENOUS at 13:09

## 2021-02-07 RX ADMIN — CEFAZOLIN 3000 MG: 10 INJECTION, POWDER, FOR SOLUTION INTRAVENOUS at 12:35

## 2021-02-07 RX ADMIN — SIMETHICONE 80 MG: 80 TABLET, CHEWABLE ORAL at 20:40

## 2021-02-07 RX ADMIN — FENTANYL CITRATE 100 MCG: 50 INJECTION, SOLUTION INTRAMUSCULAR; INTRAVENOUS at 12:38

## 2021-02-07 RX ADMIN — SODIUM CHLORIDE, POTASSIUM CHLORIDE, SODIUM LACTATE AND CALCIUM CHLORIDE: 600; 310; 30; 20 INJECTION, SOLUTION INTRAVENOUS at 13:00

## 2021-02-07 RX ADMIN — LABETALOL HYDROCHLORIDE 300 MG: 200 TABLET, FILM COATED ORAL at 06:58

## 2021-02-07 RX ADMIN — LIDOCAINE HYDROCHLORIDE,EPINEPHRINE BITARTRATE 8 ML: 20; .005 INJECTION, SOLUTION EPIDURAL; INFILTRATION; INTRACAUDAL; PERINEURAL at 12:41

## 2021-02-07 RX ADMIN — Medication: at 18:42

## 2021-02-07 RX ADMIN — SODIUM CHLORIDE, POTASSIUM CHLORIDE, SODIUM LACTATE AND CALCIUM CHLORIDE: 600; 310; 30; 20 INJECTION, SOLUTION INTRAVENOUS at 12:41

## 2021-02-07 RX ADMIN — Medication 15 ML/HR: at 02:25

## 2021-02-07 RX ADMIN — KETOROLAC TROMETHAMINE 30 MG: 30 INJECTION, SOLUTION INTRAMUSCULAR at 15:19

## 2021-02-07 RX ADMIN — Medication 20 MILLI-UNITS/MIN: at 06:58

## 2021-02-07 RX ADMIN — SODIUM CHLORIDE, POTASSIUM CHLORIDE, SODIUM LACTATE AND CALCIUM CHLORIDE: 600; 310; 30; 20 INJECTION, SOLUTION INTRAVENOUS at 06:58

## 2021-02-07 RX ADMIN — LIDOCAINE HYDROCHLORIDE,EPINEPHRINE BITARTRATE 2 ML: 20; .005 INJECTION, SOLUTION EPIDURAL; INFILTRATION; INTRACAUDAL; PERINEURAL at 12:45

## 2021-02-07 RX ADMIN — KETOROLAC TROMETHAMINE 30 MG: 30 INJECTION, SOLUTION INTRAMUSCULAR at 21:30

## 2021-02-07 RX ADMIN — OXYCODONE HYDROCHLORIDE AND ACETAMINOPHEN 2 TABLET: 5; 325 TABLET ORAL at 18:43

## 2021-02-07 RX ADMIN — DOCUSATE SODIUM 100 MG: 100 CAPSULE ORAL at 20:32

## 2021-02-07 RX ADMIN — LIDOCAINE HYDROCHLORIDE,EPINEPHRINE BITARTRATE 10 ML: 20; .005 INJECTION, SOLUTION EPIDURAL; INFILTRATION; INTRACAUDAL; PERINEURAL at 12:38

## 2021-02-07 RX ADMIN — CEPHALEXIN 500 MG: 500 CAPSULE ORAL at 18:42

## 2021-02-07 ASSESSMENT — PULMONARY FUNCTION TESTS
PIF_VALUE: 1
PIF_VALUE: 1
PIF_VALUE: 0
PIF_VALUE: 1
PIF_VALUE: -2
PIF_VALUE: 1
PIF_VALUE: -3
PIF_VALUE: 1
PIF_VALUE: 1
PIF_VALUE: 0
PIF_VALUE: 1

## 2021-02-07 ASSESSMENT — PAIN SCALES - GENERAL
PAINLEVEL_OUTOF10: 8
PAINLEVEL_OUTOF10: 0
PAINLEVEL_OUTOF10: 8

## 2021-02-07 ASSESSMENT — LIFESTYLE VARIABLES: SMOKING_STATUS: 0

## 2021-02-07 NOTE — PROGRESS NOTES
Dr. Opal Pinon called in updated on sve and orders received pt needs a c/s due to failure to progress.

## 2021-02-07 NOTE — FLOWSHEET NOTE
Admitted to room 302 from [facility] via cart. Oriented to nurse call system. [facility] Iv LR infusing to right arm. Mepilex dressing clean,dry and intact to abdoimnal incision. Peipad with small amount of lochia. Spear patent draining yellow urine. PCDs on. Instructed not to drink from straws or to drink carbonated  Beverages. Call lite with in reach.  States had the Flu vaccine and will take the T-Dap  Prior to discharge

## 2021-02-07 NOTE — ANESTHESIA PRE PROCEDURE
Department of Anesthesiology  Preprocedure Note       Name:  Boubacar Vivar   Age:  27 y.o.  :  1990                                          MRN:  [id number]         Date:  2021      Surgeon: Fabi Montemayor):  Juventino Runner, MD    Procedure: Procedure(s):   SECTION    Medications prior to admission:   Prior to Admission medications    Medication Sig Start Date End Date Taking? Authorizing Provider   pantoprazole (PROTONIX) 20 MG tablet  20  Yes Historical Provider, MD   LABETALOL HCL PO Take 300 mg by mouth 3 times daily    Yes Historical Provider, MD   docusate sodium (COLACE) 100 MG capsule Take 100 mg by mouth daily    Yes Historical Provider, MD   Prenatal Vit-Fe Fumarate-FA (PRENATAL PO) Take by mouth   Yes Historical Provider, MD   Lancets MISC 4 times daily. Do fasting blood sugar+ test blood sugar 2 hours after each meal 21   Katherine Rodarte MD   blood glucose monitor strips Use In Vitro route 4 times daily.  Do fasting blood sugar+ test blood sugar 2 hours after each meal 21   Katherine Rodarte MD   glucose monitoring kit (FREESTYLE) monitoring kit Dispense one meter 21   Katherine Rodarte MD       Current medications:    Current Facility-Administered Medications   Medication Dose Route Frequency Provider Last Rate Last Admin    naloxone [facility]) injection 0.4 mg  0.4 mg Intravenous PRN Chepe Andrews, DO        nalbuphine (NUBAIN) injection 5 mg  5 mg Intravenous Q4H PRN Chepe Andrews, DO        fentaNYL 1.85mcg/ml and Bupivicaine 0.1% in 0.9% NS 135ml infusion (OB) epidural  15 mL/hr Epidural Continuous Chepe Andrews DO 15 mL/hr at 21 0926 15 mL/hr at 21 6168    lactated ringers infusion   Intravenous Continuous Juventino Runner, MD        citric acid-sodium citrate (BICITRA) solution 30 mL  30 mL Oral Once Juventino Runner, MD  Elevated blood pressure affecting pregnancy in third trimester, antepartum O16.3    36 weeks gestation of pregnancy Z3A.36    37 weeks gestation of pregnancy Z3A.37    Encounter for induction of labor Z34.90       Past Medical History:        Diagnosis Date    Diet controlled gestational diabetes mellitus (GDM), antepartum 1/22/2021    Hypertension     PCOS (polycystic ovarian syndrome)        Past Surgical History:        Procedure Laterality Date    APPENDECTOMY  2013       Social History:    Social History     Tobacco Use    Smoking status: Never Smoker    Smokeless tobacco: Never Used   Substance Use Topics    Alcohol use: Not Currently                                Counseling given: Not Answered      Vital Signs (Current):   Vitals:    02/07/21 0645 02/07/21 0715 02/07/21 0730 02/07/21 0745   BP: (!) 141/77 (!) 140/74  135/79   Pulse:  75     Resp:  16     Temp:  36.6 °C (97.9 °F)     TempSrc:  Oral     SpO2: 93% 91% 95% 90%   Weight:       Height:                                                  BP Readings from Last 3 Encounters:   02/07/21 135/79   02/05/21 128/78   01/29/21 131/81       NPO Status:                                                                                 BMI:   Wt Readings from Last 3 Encounters:   02/05/21 (!) 330 lb (149.7 kg)   02/05/21 (!) 330 lb 9.6 oz (150 kg)   01/29/21 (!) 327 lb 9.6 oz (148.6 kg)     Body mass index is 51.69 kg/m².     CBC:   Lab Results   Component Value Date    WBC 12.2 02/05/2021    RBC 4.45 02/05/2021    HGB 11.9 02/05/2021    HCT 36.9 02/05/2021    MCV 82.9 02/05/2021    RDW 15.8 02/05/2021     02/05/2021       CMP:   Lab Results   Component Value Date     02/05/2021    K 3.7 02/05/2021    K 3.9 12/02/2020     02/05/2021    CO2 22 02/05/2021    BUN 7 02/05/2021    CREATININE 0.6 02/05/2021    GFRAA >60 02/05/2021    LABGLOM >60 02/05/2021    GLUCOSE 80 02/05/2021    PROT 6.7 02/05/2021    CALCIUM 9.5 02/05/2021 BILITOT 0.3 02/05/2021    ALKPHOS 116 02/05/2021    AST 15 02/05/2021    ALT 16 02/05/2021       POC Tests: No results for input(s): POCGLU, POCNA, POCK, POCCL, POCBUN, POCHEMO, POCHCT in the last 72 hours. Coags:   Lab Results   Component Value Date    PROTIME 11.3 02/05/2021    INR 1.0 02/05/2021    APTT 27.7 02/05/2021       HCG (If Applicable): No results found for: PREGTESTUR, PREGSERUM, HCG, HCGQUANT     ABGs: No results found for: PHART, PO2ART, GFO0PXQ, SRR6UBL, BEART, Y4PNHTZW     Type & Screen (If Applicable):  No results found for: LABABO, LABRH    Drug/Infectious Status (If Applicable):  No results found for: HIV, HEPCAB    COVID-19 Screening (If Applicable):   Lab Results   Component Value Date    COVID19 Not Detected 02/01/2021         Anesthesia Evaluation  Patient summary reviewed and Nursing notes reviewed no history of anesthetic complications:   Airway: Mallampati: II  TM distance: >3 FB   Neck ROM: full  Mouth opening: > = 3 FB Dental: normal exam         Pulmonary:Negative Pulmonary ROS breath sounds clear to auscultation      (-) not a current smoker          Patient did not smoke on day of surgery. Cardiovascular:  Exercise tolerance: good (>4 METS),   (+) hypertension: moderate,         Rhythm: regular  Rate: normal           Beta Blocker:  Not on Beta Blocker         Neuro/Psych:   Negative Neuro/Psych ROS              GI/Hepatic/Renal:   (+) morbid obesity          Endo/Other:    (+) DiabetesType II DM, well controlled, , .                  ROS comment: Gestational diabetes. Diet controlled. Abdominal:           Vascular: negative vascular ROS. Anesthesia Plan      general, spinal and epidural     ASA 3     (Discussed labor options with patient and spouse. Questions answered. Patient agrees to epidural when needed.)        Anesthetic plan and risks discussed with patient and spouse. Plan discussed with attending. CBC   Lab Results   Component Value Date    WBC 12.2 02/05/2021    RBC 4.45 02/05/2021    HGB 11.9 02/05/2021    HCT 36.9 02/05/2021    MCV 82.9 02/05/2021    RDW 15.8 02/05/2021     02/05/2021     CMP    Lab Results   Component Value Date     02/05/2021    K 3.7 02/05/2021    K 3.9 12/02/2020     02/05/2021    CO2 22 02/05/2021    BUN 7 02/05/2021    CREATININE 0.6 02/05/2021    GFRAA >60 02/05/2021    LABGLOM >60 02/05/2021    GLUCOSE 80 02/05/2021    PROT 6.7 02/05/2021    CALCIUM 9.5 02/05/2021    BILITOT 0.3 02/05/2021    ALKPHOS 116 02/05/2021    AST 15 02/05/2021    ALT 16 02/05/2021     BMP    Lab Results   Component Value Date     02/05/2021    K 3.7 02/05/2021    K 3.9 12/02/2020     02/05/2021    CO2 22 02/05/2021    BUN 7 02/05/2021    CREATININE 0.6 02/05/2021    CALCIUM 9.5 02/05/2021    GFRAA >60 02/05/2021    LABGLOM >60 02/05/2021    GLUCOSE 80 02/05/2021     POCGlucose  Recent Labs     02/05/21  1810   GLUCOSE 80        Coags  Lab Results   Component Value Date    PROTIME 11.3 02/05/2021    INR 1.0 02/05/2021    APTT 27.7 02/05/2021       HCG (If Applicable) No results found for: PREGTESTUR, PREGSERUM, HCG, HCGQUANT     ABGs   No results found for: PHART, PO2ART, CTI3BMB, FZK7KWD, BEART, S5KCYTSE     Type & Screen (If Applicable)  Lab Results   Component Value Date    ABORH O POS 02/05/2021     Active Problem List with ICD10 Codes  Patient Active Problem List   Diagnosis Code    23 weeks gestation of pregnancy Z3A.23    Palpitations with regular cardiac rhythm R00.2    28 weeks gestation of pregnancy Z3A.28    32 weeks gestation of pregnancy Z3A.32    Primiparity, third trimester Z34.03    35 weeks gestation of pregnancy Z3A.35    Diet controlled gestational diabetes mellitus (GDM), antepartum O20.18    Pregnancy with 39 completed weeks gestation Z3A.36    Elevated blood pressure affecting pregnancy in third trimester, antepartum O16.3  36 weeks gestation of pregnancy Z3A.36    37 weeks gestation of pregnancy Z3A.37    Encounter for induction of labor Z34.90       HIGINIO Alicea - CRNA  February 7, 2021  12:24 PM      HIGINIO Alicea - ALYSON   2/7/2021

## 2021-02-07 NOTE — PROCEDURES
PREOPERATIVE DIAGNOSES:     1.  38 week intrauterine pregnancy.   2.  failure to progress      POSTOPERATIVE DIAGNOSES:     Same.      PROCEDURE:  primary low transverse  section.      SURGEON:  Clarisse Sequeira MD   Assist: Aimee Rositaast  ESTIMATED BLOOD LOSS:  473 mL.      COMPLICATIONS:  None.         ANESTHESIA:  epidural.         FINDINGS:  [unfilled]  infant Apgars APGAR One: N/A  and APGAR Five: N/A , weight: [unfilled] lbs  [unfilled] oz  .  Normal  tubes and ovaries bilaterally.         DETAILS OF PROCEDURE:  See Full Note Dictated    Clarisse Sequeira 2021 1:28 PM

## 2021-02-07 NOTE — LACTATION NOTE
Pt wants to pump & bottle feed. Encouraged to also try to latch baby, if baby will do so, for the first few days to help baby receive colostrum. Will set her up with [employer] pump, bottles, cleaning supplies- plan to pump q 3-4 hr to stimulate her milk production. Pt requests electric breast pump for home to increase her milk supply.

## 2021-02-07 NOTE — ANESTHESIA PROCEDURE NOTES
Epidural Block    Patient location during procedure: OB  Start time: 2/7/2021 2:15 AM  End time: 2/7/2021 2:30 AM  Reason for block: labor epidural  Staffing  Performed: resident/CRNA   Anesthesiologist: Nimisha Way DO  Resident/CRNA: HIGINIO Oscar CRNA  Preanesthetic Checklist  Completed: patient identified, IV checked, site marked, risks and benefits discussed, surgical consent, monitors and equipment checked, pre-op evaluation, timeout performed, anesthesia consent given, oxygen available and patient being monitored  Epidural  Patient position: sitting  Prep: ChloraPrep  Patient monitoring: cardiac monitor, continuous pulse ox and frequent blood pressure checks  Approach: midline  Location: lumbar (1-5)  Injection technique: BRITTNEY air  Provider prep: mask and sterile gloves  Needle  Needle type: Tuohy   Needle gauge: 18 G  Needle length: 2.5 in  Needle insertion depth: 8 cm  Catheter type: end hole  Catheter size: 20 G.   Catheter at skin depth: 14 cm  Test dose: negative  Assessment  Hemodynamics: stable  Attempts: 1

## 2021-02-08 LAB
HCT VFR BLD CALC: 31.2 % (ref 34–48)
HEMOGLOBIN: 9.9 G/DL (ref 11.5–15.5)
MCH RBC QN AUTO: 26.5 PG (ref 26–35)
MCHC RBC AUTO-ENTMCNC: 31.7 % (ref 32–34.5)
MCV RBC AUTO: 83.6 FL (ref 80–99.9)
METER GLUCOSE: 117 MG/DL (ref 74–99)
METER GLUCOSE: 99 MG/DL (ref 74–99)
PDW BLD-RTO: 15.7 FL (ref 11.5–15)
PLATELET # BLD: 214 E9/L (ref 130–450)
PMV BLD AUTO: 11.4 FL (ref 7–12)
RBC # BLD: 3.73 E12/L (ref 3.5–5.5)
WBC # BLD: 10.2 E9/L (ref 4.5–11.5)

## 2021-02-08 PROCEDURE — 99232 SBSQ HOSP IP/OBS MODERATE 35: CPT | Performed by: INTERNAL MEDICINE

## 2021-02-08 PROCEDURE — 6370000000 HC RX 637 (ALT 250 FOR IP): Performed by: OBSTETRICS & GYNECOLOGY

## 2021-02-08 PROCEDURE — 6370000000 HC RX 637 (ALT 250 FOR IP): Performed by: FAMILY MEDICINE

## 2021-02-08 PROCEDURE — 36415 COLL VENOUS BLD VENIPUNCTURE: CPT

## 2021-02-08 PROCEDURE — 85027 COMPLETE CBC AUTOMATED: CPT

## 2021-02-08 PROCEDURE — 2580000003 HC RX 258: Performed by: OBSTETRICS & GYNECOLOGY

## 2021-02-08 PROCEDURE — 1220000000 HC SEMI PRIVATE OB R&B

## 2021-02-08 PROCEDURE — 82962 GLUCOSE BLOOD TEST: CPT

## 2021-02-08 PROCEDURE — 6360000002 HC RX W HCPCS: Performed by: OBSTETRICS & GYNECOLOGY

## 2021-02-08 RX ORDER — HYDROCODONE BITARTRATE AND ACETAMINOPHEN 5; 325 MG/1; MG/1
2 TABLET ORAL EVERY 4 HOURS PRN
Status: DISCONTINUED | OUTPATIENT
Start: 2021-02-08 | End: 2021-02-09 | Stop reason: HOSPADM

## 2021-02-08 RX ORDER — NIFEDIPINE 30 MG/1
30 TABLET, FILM COATED, EXTENDED RELEASE ORAL DAILY
Status: DISCONTINUED | OUTPATIENT
Start: 2021-02-08 | End: 2021-02-09 | Stop reason: HOSPADM

## 2021-02-08 RX ORDER — HYDROCODONE BITARTRATE AND ACETAMINOPHEN 5; 325 MG/1; MG/1
1 TABLET ORAL EVERY 4 HOURS PRN
Status: DISCONTINUED | OUTPATIENT
Start: 2021-02-08 | End: 2021-02-09 | Stop reason: HOSPADM

## 2021-02-08 RX ADMIN — DOCUSATE SODIUM 100 MG: 100 CAPSULE ORAL at 20:22

## 2021-02-08 RX ADMIN — DOCUSATE SODIUM 100 MG: 100 CAPSULE ORAL at 09:36

## 2021-02-08 RX ADMIN — CEPHALEXIN 500 MG: 500 CAPSULE ORAL at 00:44

## 2021-02-08 RX ADMIN — SIMETHICONE 80 MG: 80 TABLET, CHEWABLE ORAL at 23:36

## 2021-02-08 RX ADMIN — CEPHALEXIN 500 MG: 500 CAPSULE ORAL at 22:59

## 2021-02-08 RX ADMIN — ONDANSETRON 8 MG: 4 TABLET, ORALLY DISINTEGRATING ORAL at 11:41

## 2021-02-08 RX ADMIN — METRONIDAZOLE 500 MG: 500 TABLET ORAL at 06:50

## 2021-02-08 RX ADMIN — PRENATAL WITH FERROUS FUM AND FOLIC ACID 1 TABLET: 3080; 920; 120; 400; 22; 1.84; 3; 20; 10; 1; 12; 200; 27; 25; 2 TABLET ORAL at 09:35

## 2021-02-08 RX ADMIN — CEPHALEXIN 500 MG: 500 CAPSULE ORAL at 06:50

## 2021-02-08 RX ADMIN — HYDROCODONE BITARTRATE AND ACETAMINOPHEN 1 TABLET: 5; 325 TABLET ORAL at 16:19

## 2021-02-08 RX ADMIN — SODIUM CHLORIDE, PRESERVATIVE FREE 10 ML: 5 INJECTION INTRAVENOUS at 04:34

## 2021-02-08 RX ADMIN — LABETALOL HYDROCHLORIDE 150 MG: 200 TABLET, FILM COATED ORAL at 09:36

## 2021-02-08 RX ADMIN — METRONIDAZOLE 500 MG: 500 TABLET ORAL at 22:58

## 2021-02-08 RX ADMIN — METRONIDAZOLE 500 MG: 500 TABLET ORAL at 14:49

## 2021-02-08 RX ADMIN — FERROUS SULFATE TAB 325 MG (65 MG ELEMENTAL FE) 325 MG: 325 (65 FE) TAB at 09:36

## 2021-02-08 RX ADMIN — SIMETHICONE 80 MG: 80 TABLET, CHEWABLE ORAL at 09:36

## 2021-02-08 RX ADMIN — ACETAMINOPHEN 650 MG: 325 TABLET ORAL at 06:51

## 2021-02-08 RX ADMIN — SODIUM CHLORIDE, PRESERVATIVE FREE 10 ML: 5 INJECTION INTRAVENOUS at 09:37

## 2021-02-08 RX ADMIN — METRONIDAZOLE 500 MG: 500 TABLET ORAL at 00:43

## 2021-02-08 RX ADMIN — SODIUM CHLORIDE, PRESERVATIVE FREE 10 ML: 5 INJECTION INTRAVENOUS at 11:01

## 2021-02-08 RX ADMIN — KETOROLAC TROMETHAMINE 30 MG: 30 INJECTION, SOLUTION INTRAMUSCULAR at 04:33

## 2021-02-08 RX ADMIN — HYDROCODONE BITARTRATE AND ACETAMINOPHEN 2 TABLET: 5; 325 TABLET ORAL at 20:21

## 2021-02-08 RX ADMIN — KETOROLAC TROMETHAMINE 30 MG: 30 INJECTION, SOLUTION INTRAMUSCULAR at 11:01

## 2021-02-08 RX ADMIN — IBUPROFEN 800 MG: 800 TABLET, FILM COATED ORAL at 17:32

## 2021-02-08 RX ADMIN — SIMETHICONE 80 MG: 80 TABLET, CHEWABLE ORAL at 03:34

## 2021-02-08 RX ADMIN — SIMETHICONE 80 MG: 80 TABLET, CHEWABLE ORAL at 14:49

## 2021-02-08 RX ADMIN — CEPHALEXIN 500 MG: 500 CAPSULE ORAL at 14:49

## 2021-02-08 RX ADMIN — HYDROCODONE BITARTRATE AND ACETAMINOPHEN 1 TABLET: 5; 325 TABLET ORAL at 09:35

## 2021-02-08 ASSESSMENT — PAIN SCALES - GENERAL
PAINLEVEL_OUTOF10: 6
PAINLEVEL_OUTOF10: 3
PAINLEVEL_OUTOF10: 8
PAINLEVEL_OUTOF10: 3

## 2021-02-08 NOTE — CONSULTS
[facility]    Pt seen/examined by me at 4:45 PM.    Reason for Consult:  Medical management of hypertension    History of Present Illness:  27 y.o. female,  to 3, with a history of essential hypertension since age 25, presented for induction of labor at 37 weeks gestation on  [facility], underwent  this afternoon for failure to progress. We are consulted for hypertension management. I saw the patient on the post-partum unit. Pt has had hypertension since age 25. Vin Knutson in the past.  Was on Coreg prior to pregnancy at 25 mg BID and it was not effective, her systolic BP was always 663'V/454'L. States labetolol during pregnancy was effective; she did need the dose increased all the way to 300 mg TID. Denies cardiac or vascular problems. Had some ankle edema towards the end of pregnancy, not much. Denies any shortness of breath. Admits to periodic headache behind left eye off and on for a few years. She had it this morning but not now, is resolved since delivery. She denies any blurry vision. He did receive normal saline 2 L during delivery today. Denies focal numbness tingling or weakness in the arms. She did receive epidural anesthesia for the  and she is still numb from the waist down. Has PCOS and was on Metformin prior to pregnancy. Stopped that for the pregnancy. Was found to have gestational diabetes as well. Diet controlled. Did not require insulin. She works here as a PACU nurse. Informant(s) for H&P: patient    REVIEW OF SYSTEMS:  Complete ROS performed with patient, pertinent positives and negatives are listed in the HPI.     PMH:  Past Medical History:   Diagnosis Date    Diet controlled gestational diabetes mellitus (GDM), antepartum 2021    Hypertension     PCOS (polycystic ovarian syndrome)        Surgical History:  Past Surgical History: Procedure Laterality Date    APPENDECTOMY  2013     SECTION N/A 2021     SECTION performed by Мария Thompson MD at [facility] OR       Medications Prior to Admission:    Prior to Admission medications    Medication Sig Start Date End Date Taking? Authorizing Provider   pantoprazole (PROTONIX) 20 MG tablet  20  Yes Historical Provider, MD   LABETALOL HCL PO Take 300 mg by mouth 3 times daily    Yes Historical Provider, MD   docusate sodium (COLACE) 100 MG capsule Take 100 mg by mouth daily    Yes Historical Provider, MD   Prenatal Vit-Fe Fumarate-FA (PRENATAL PO) Take by mouth   Yes Historical Provider, MD   Lancets MISC 4 times daily. Do fasting blood sugar+ test blood sugar 2 hours after each meal 21   Graham Freitas MD   blood glucose monitor strips Use In Vitro route 4 times daily. Do fasting blood sugar+ test blood sugar 2 hours after each meal 21   Graham Freitas MD   glucose monitoring kit (FREESTYLE) monitoring kit Dispense one meter 21   Graham Freitas MD       Allergies:    Patient has no known allergies. Social History:    reports that she has never smoked. She has never used smokeless tobacco. She reports previous alcohol use. She reports that she does not use drugs. Family History:   family history is not on file.     PHYSICAL EXAM:  Vitals:  /60   Pulse 70   Temp 98.2 °F (36.8 °C) (Oral)   Resp 18   Ht 5' 7\" (1.702 m)   Wt (!) 330 lb (149.7 kg)   LMP 2020   SpO2 95%   Breastfeeding Unknown   BMI 51.69 kg/m²   General Appearance: alert and oriented to person, place and time and in no acute distress, obese,  present, nourished, good hygiene  Skin: warm and dry, good turgor, no rashes, no jaundice  Head: normocephalic and atraumatic  Eyes: pupils equal, round, and reactive to light, extraocular eye movements intact, conjunctivae normal  Neck: neck supple and non tender without mass Pulmonary/Chest: clear to auscultation bilaterally- no wheezes, rales or rhonchi, normal air movement, no respiratory distress  Cardiovascular: normal rate, normal S1 and S2 and no carotid bruits  Abdomen: soft, non-tender, non-distended, normal bowel sounds, no masses or organomegaly  Extremities: no cyanosis, no clubbing and no edema  Neurologic: no cranial nerve deficit and speech normal, ental status normal        LABS:  Recent Labs     02/05/21 1810      K 3.7      CO2 22   BUN 7   CREATININE 0.6   GLUCOSE 80   CALCIUM 9.5       Recent Labs     02/05/21 1810   WBC 12.2*   RBC 4.45   HGB 11.9   HCT 36.9   MCV 82.9   MCH 26.7   MCHC 32.2   RDW 15.8*      MPV 11.7       No results for input(s): POCGLU in the last 72 hours.     CBC with Differential:    Lab Results   Component Value Date    WBC 12.2 02/05/2021    RBC 4.45 02/05/2021    HGB 11.9 02/05/2021    HCT 36.9 02/05/2021     02/05/2021    MCV 82.9 02/05/2021    MCH 26.7 02/05/2021    MCHC 32.2 02/05/2021    RDW 15.8 02/05/2021    LYMPHOPCT 13.4 12/02/2020    MONOPCT 6.0 12/02/2020    BASOPCT 0.2 12/02/2020    MONOSABS 0.75 12/02/2020    LYMPHSABS 1.69 12/02/2020    EOSABS 0.20 12/02/2020    BASOSABS 0.02 12/02/2020     CMP:    Lab Results   Component Value Date     02/05/2021    K 3.7 02/05/2021    K 3.9 12/02/2020     02/05/2021    CO2 22 02/05/2021    BUN 7 02/05/2021    CREATININE 0.6 02/05/2021    GFRAA >60 02/05/2021    LABGLOM >60 02/05/2021    GLUCOSE 80 02/05/2021    PROT 6.7 02/05/2021    LABALBU 3.6 02/05/2021    CALCIUM 9.5 02/05/2021    BILITOT 0.3 02/05/2021    ALKPHOS 116 02/05/2021    AST 15 02/05/2021    ALT 16 02/05/2021     BMP:    Lab Results   Component Value Date     02/05/2021    K 3.7 02/05/2021    K 3.9 12/02/2020     02/05/2021    CO2 22 02/05/2021    BUN 7 02/05/2021    LABALBU 3.6 02/05/2021    CREATININE 0.6 02/05/2021    CALCIUM 9.5 02/05/2021    GFRAA >60 02/05/2021 LABGLOM >60 2021    GLUCOSE 80 2021     Hepatic Function Panel:    Lab Results   Component Value Date    ALKPHOS 116 2021    ALT 16 2021    AST 15 2021    PROT 6.7 2021    BILITOT 0.3 2021    LABALBU 3.6 2021     Albumin:    Lab Results   Component Value Date    LABALBU 3.6 2021     Calcium:    Lab Results   Component Value Date    CALCIUM 9.5 2021     Magnesium:    Lab Results   Component Value Date    MG 1.8 2020     PT/INR:    Lab Results   Component Value Date    PROTIME 11.3 2021    INR 1.0 2021     U/A:    Lab Results   Component Value Date    NITRITE neg 2020    COLORU Yellow 2021    PROTEINU Negative 2021    PHUR 6.0 2021    WBCUA NONE 2021    RBCUA NONE 2021    BACTERIA RARE 2021    CLARITYU Clear 2021    SPECGRAV 1.010 2021    LEUKOCYTESUR Negative 2021    UROBILINOGEN 0.2 2021    BILIRUBINUR Negative 2021    BILIRUBINUR neg 2020    BLOODU Negative 2021    GLUCOSEU negative 2021    GLUCOSEU Negative 2021       Radiology:   No orders to display       EKG:  None to report      ASSESSMENT:      Principal Problem:    Encounter for induction of labor  Active Problems:    Diet controlled gestational diabetes mellitus (GDM), antepartum    Elevated blood pressure affecting pregnancy in third trimester, antepartum    Essential hypertension  Resolved Problems:    * No resolved hospital problems.  *      PLAN:    1.  1 Para 0 to 1, S/p  delivery of healthy baby girl at 37 weeks this afternoon -- doing well since delivery 2. Essential hypertension -- pt has hx of this since she was 25years old. Was on Ziac for a while, then had to go to Coreg 25 mg BID before pregnancy, she states that even that was not effective. On labetalol 300 mg every 8 hours during the pregnancy and this was effective for her. Wants to continue if she continues to need anti-hypertensive. Renal function normal today. Urinalysis with no protein. 3. Hx of PCOS -- off metformin since pregnancy  4. Gestational diabetes -- diet controlled. Does have a hx of impaired glucose tolerance prior to pregnancy. 5. Morbid obesity    Likely to have beta-blocker withdrawal if suddenly stops labetalol. Because BP normal since delivery and hasn't had labetalol since this AM, mid-day dose not required. Pt likely has lower BP at this time due to epidural.  BP likely to increase. Will give labetalol 150 mg PO BID for now, watch BP's closely for now. May or may not need to increase back up to 300 mg TID. Watch sugars 2 times/day for now, coverage with medium scale insulin if needed. Thank you very much for this interesting consult and we will continue to follow along. Feel free to call with any questions at [phone]. Electronically signed by Ifeoma Guzman DO on 2/7/2021 at 9:06 PM    NOTE: This report was transcribed using voice recognition software. Every effort was made to ensure accuracy; however, inadvertent computerized transcription errors may be present.

## 2021-02-08 NOTE — FLOWSHEET NOTE
Wojciech had a large amount of undigested food emesis, states she feels better now. Increased ambulation encouraged.

## 2021-02-08 NOTE — PROGRESS NOTES
Neck: neck supple and non tender without mass   Pulmonary/Chest: clear to auscultation bilaterally- no wheezes, rales or rhonchi, normal air movement, no respiratory distress  Cardiovascular: normal rate, normal S1 and S2 and no carotid bruits  Abdomen: soft, non-tender, non-distended, normal bowel sounds, no masses or organomegaly  Extremities: no cyanosis, no clubbing and +1 edema  Neurologic: no cranial nerve deficit and speech normal        Recent Labs     02/05/21  1810      K 3.7      CO2 22   BUN 7   CREATININE 0.6   GLUCOSE 80   CALCIUM 9.5       Recent Labs     02/05/21  1810 02/08/21  0425   WBC 12.2* 10.2   RBC 4.45 3.73   HGB 11.9 9.9*   HCT 36.9 31.2*   MCV 82.9 83.6   MCH 26.7 26.5   MCHC 32.2 31.7*   RDW 15.8* 15.7*    214   MPV 11.7 11.4         Assessment:    Principal Problem:    Encounter for induction of labor  Active Problems:    Diet controlled gestational diabetes mellitus (GDM), antepartum    Elevated blood pressure affecting pregnancy in third trimester, antepartum    Essential hypertension  Resolved Problems:    * No resolved hospital problems. *      Plan:  1. Essential HTN, worsened with pregnancy, was on labetalol 300 tid, changed to 150 bid, switch to nifedipine  2. Hx of PCOS, off her metformin, we can restart it back  3. Gestational diabetes, diet controlled, no intervention for now. NOTE: This report was transcribed using voice recognition software. Every effort was made to ensure accuracy; however, inadvertent computerized transcription errors may be present.   Electronically signed by Elizabeth Feldman MD on 2/8/2021 at 8:43 AM

## 2021-02-08 NOTE — LACTATION NOTE
Mom plans on pumping only and supplementing with formula. Normal milk production reviewed and encouraged to maintain pumping routine to establish supply. Encouraged parents to give whatever colostrum she pumps and to call if they need help. Support provided.

## 2021-02-08 NOTE — OP NOTE
[id number] [address]                                OPERATIVE REPORT    PATIENT NAME: Laurence Ramírez                 :        1990  MED REC NO:   [id number]                            ROOM:       0302  ACCOUNT NO:   [de-identified]                           ADMIT DATE: 2021  PROVIDER:     Gilda Goldberg MD    DATE OF PROCEDURE:  2021    PREOPERATIVE DIAGNOSES:  Intrauterine pregnancy at 38 weeks and failure  to progress, induced for gestational hypertension and diabetes. POSTOPERATIVE DIAGNOSES:  Intrauterine pregnancy at 38 weeks and failure  to progress, induced for gestational hypertension and diabetes. PROCEDURE PERFORMED:  Primary low transverse  section. SURGEON:  Gilda Goldberg MD    ASSISTANT:  Dr. Phylicia Bentley. ANESTHESIA:  Epidural.    COMPLICATIONS:  None. FINDINGS:  Female infant, 6 pounds 3 ounces. Apgars were 8 and 8. INDICATIONS:  The patient is a primigravida at 38 weeks, induced per  recommendation of M, Dr. Som Alicea, due to labile blood pressures and  gestational diabetes. The patient was placed on Cytotec x5 doses. Cervix was unchanged. Pitocin was started. She spontaneously ruptured  overnight. Received an epidural.  She was 3 to 4 cm for greater than 6  hours. Pitocin had been maxed out for the duration; therefore,  determined to perform a  section due to arrest of dilatation. DESCRIPTION OF PROCEDURE:  After consent was confirmed with the patient,  she was taken to the operating room and prepped and draped in the dorsal  supine position under epidural anesthesia that was noted to be adequate. A Pfannenstiel skin incision was made with a scalpel and carried down to  the underlying layer of the fascia with a Bovie.   The fascia was nicked  in the midline, transected bilaterally with a Bovie, and dissected superiorly and inferiorly from the rectus sheath without complications. The rectus was  in the midline. The peritoneum was elevated  and entered bluntly, dissected bilaterally manually. Bladder blade was  inserted. Vesicouterine peritoneum was incised in a smile fashion. Bladder flap was created. Bladder blade was then reinserted. Uterus  was incised with a scalpel. Membranes were ruptured and noted to be  clear and freely flowing. Baby was delivered vertex, vigorous at birth. Nose and mouth were suctioned with bulb suction. Cord gases and cord  blood were obtained. Cord was clamped and cut after a 30-second delay,  handing off to the waiting nursing staff. Placenta was manually  extracted and sent to Pathology. Uterus was exteriorized and cleared of  all clots and debris. The uterus was returned to the pelvis. The  uterine incision was closed with chromic suture in a running locked  fashion x2. Excellent hemostasis was confirmed. Fascia was then closed  with Stratafix. After all instrumentations were removed, subcutaneous  tissue was irrigated and reapproximated with plain and subcuticular  staple was used to close the skin followed by Mepilex dressing. Sponge,  lap, and needle counts were correct x2.  3 gm of Ancef given  preoperatively and the patient was transferred to Recovery in stable  condition.         Heladio Conway MD    D: 02/07/2021 13:53:33       T: 02/07/2021 14:44:52     [email or web address]  Job#: [id number]     Doc#: [id number]    CC:

## 2021-02-08 NOTE — ANESTHESIA POSTPROCEDURE EVALUATION
Department of Anesthesiology  Postprocedure Note    Patient: Cory Cervantes  MRN: [id number]  YOB: 1990  Date of evaluation: 2021  Time:  9:54 AM     Procedure Summary     Date: 21 Room / Location: [facility]    Anesthesia Start: 215 Anesthesia Stop: 0539    Procedures:        SECTION (N/A Uterus)      Labor Analgesia Diagnosis: (Failure to Progress)    Surgeons: Radha Steven MD Responsible Provider: Anshu Liu MD    Anesthesia Type: general, spinal, epidural ASA Status: 3          Anesthesia Type: general, spinal, epidural    Enrike Phase I: Enrike Score: 10    Enrike Phase II:      Last vitals: Reviewed and per EMR flowsheets.        Anesthesia Post Evaluation    Patient location during evaluation: bedside  Patient participation: complete - patient participated  Level of consciousness: awake and alert  Pain score: 0  Airway patency: patent  Nausea & Vomiting: no nausea  Complications: no  Cardiovascular status: blood pressure returned to baseline and hemodynamically stable  Respiratory status: acceptable  Hydration status: euvolemic

## 2021-02-08 NOTE — FLOWSHEET NOTE
Dr. Yee Villagomez notified TW=142/85, will hold Procardia for now since [facility] had dose of Labetalol this morning.

## 2021-02-08 NOTE — PROGRESS NOTES
SUBJECTIVE:   Patient without complaint    OBJECTIVE:   /83   Pulse 76   Temp 98.2 °F (36.8 °C) (Oral)   Resp 18   Ht 5' 7\" (1.702 m)   Wt (!) 330 lb (149.7 kg)   LMP 05/17/2020   SpO2 95%   Breastfeeding Unknown   BMI 51.69 kg/m²      Recent Labs     02/05/21  1810   WBC 12.2*   RBC 4.45   HGB 11.9   HCT 36.9   MCV 82.9   MCH 26.7   MCHC 32.2   RDW 15.8*      MPV 11.7      Incision clean, dry, intact   Abdomen soft, nontender    ASSESSMENT/PLAN:   Postoperative Day #1   Advance care   Hx HTN and GDM = hospitalist following   Acute blood loss anemia - FeSO4    Keith Candelario 2/8/2021 2:58 AM

## 2021-02-09 VITALS
SYSTOLIC BLOOD PRESSURE: 132 MMHG | HEIGHT: 67 IN | RESPIRATION RATE: 16 BRPM | TEMPERATURE: 98.4 F | HEART RATE: 84 BPM | DIASTOLIC BLOOD PRESSURE: 76 MMHG | WEIGHT: 293 LBS | OXYGEN SATURATION: 95 % | BODY MASS INDEX: 45.99 KG/M2

## 2021-02-09 LAB
EKG ATRIAL RATE: 88 BPM
EKG P AXIS: 35 DEGREES
EKG P-R INTERVAL: 152 MS
EKG Q-T INTERVAL: 372 MS
EKG QRS DURATION: 94 MS
EKG QTC CALCULATION (BAZETT): 450 MS
EKG R AXIS: 5 DEGREES
EKG T AXIS: 21 DEGREES
EKG VENTRICULAR RATE: 88 BPM
METER GLUCOSE: 114 MG/DL (ref 74–99)

## 2021-02-09 PROCEDURE — 6370000000 HC RX 637 (ALT 250 FOR IP): Performed by: OBSTETRICS & GYNECOLOGY

## 2021-02-09 PROCEDURE — 99232 SBSQ HOSP IP/OBS MODERATE 35: CPT | Performed by: INTERNAL MEDICINE

## 2021-02-09 PROCEDURE — 6370000000 HC RX 637 (ALT 250 FOR IP): Performed by: INTERNAL MEDICINE

## 2021-02-09 PROCEDURE — 93005 ELECTROCARDIOGRAM TRACING: CPT | Performed by: INTERNAL MEDICINE

## 2021-02-09 PROCEDURE — 82962 GLUCOSE BLOOD TEST: CPT

## 2021-02-09 RX ORDER — HYDROCODONE BITARTRATE AND ACETAMINOPHEN 5; 325 MG/1; MG/1
1 TABLET ORAL EVERY 6 HOURS PRN
Qty: 12 TABLET | Refills: 0 | Status: SHIPPED | OUTPATIENT
Start: 2021-02-09 | End: 2021-02-12

## 2021-02-09 RX ORDER — LORAZEPAM 1 MG/1
1 TABLET ORAL ONCE
Status: COMPLETED | OUTPATIENT
Start: 2021-02-09 | End: 2021-02-09

## 2021-02-09 RX ORDER — NIFEDIPINE 30 MG/1
30 TABLET, FILM COATED, EXTENDED RELEASE ORAL DAILY
Qty: 30 TABLET | Refills: 3 | Status: SHIPPED | OUTPATIENT
Start: 2021-02-10 | End: 2021-11-22

## 2021-02-09 RX ADMIN — LORAZEPAM 1 MG: 1 TABLET ORAL at 02:58

## 2021-02-09 RX ADMIN — SIMETHICONE 80 MG: 80 TABLET, CHEWABLE ORAL at 09:28

## 2021-02-09 RX ADMIN — DOCUSATE SODIUM 100 MG: 100 CAPSULE ORAL at 09:28

## 2021-02-09 RX ADMIN — BISACODYL 10 MG: 10 SUPPOSITORY RECTAL at 09:29

## 2021-02-09 RX ADMIN — NIFEDIPINE 30 MG: 30 TABLET, EXTENDED RELEASE ORAL at 09:28

## 2021-02-09 RX ADMIN — CEPHALEXIN 500 MG: 500 CAPSULE ORAL at 07:45

## 2021-02-09 RX ADMIN — HYDROCODONE BITARTRATE AND ACETAMINOPHEN 2 TABLET: 5; 325 TABLET ORAL at 00:29

## 2021-02-09 RX ADMIN — METRONIDAZOLE 500 MG: 500 TABLET ORAL at 07:45

## 2021-02-09 ASSESSMENT — PAIN SCALES - GENERAL: PAINLEVEL_OUTOF10: 7

## 2021-02-09 ASSESSMENT — PAIN DESCRIPTION - FREQUENCY: FREQUENCY: INTERMITTENT

## 2021-02-09 ASSESSMENT — PAIN DESCRIPTION - DESCRIPTORS: DESCRIPTORS: DISCOMFORT;SORE;TENDER

## 2021-02-09 ASSESSMENT — PAIN DESCRIPTION - PAIN TYPE: TYPE: SURGICAL PAIN

## 2021-02-09 NOTE — PROGRESS NOTES
[facility] [place] [facility] screening results were discussed with parent. Questions answered. Brochure given to parent. Advised to monitor developmental milestones and contact physician for any concerns.    Edu Garnica

## 2021-02-09 NOTE — CARE COORDINATION
SW Discharge Planning   SW received consult for \" mother positive fentanyl\"    SW reviewed mother's chart, and per physician  note on 1/11/21. Mother is prescribed 300mg TID of labetalol. Labetalol will cause a false positive urine screen for fentanyl per Jalyn Smith at toxicology.      Electronically signed by CLARENCE Hernandez on 2/9/2021 at 8:05 AM

## 2021-02-09 NOTE — PLAN OF CARE
Problem: Pain:  Goal: Control of acute pain  Description: Control of acute pain  2/9/2021 1158 by Marlo Sanders RN  Outcome: Completed  2/9/2021 1012 by Marlo Sanders RN  Outcome: Met This Shift  Goal: Control of chronic pain  Description: Control of chronic pain  2/9/2021 1158 by Marlo Sanders RN  Outcome: Completed  2/9/2021 1012 by Marlo Sanders RN  Outcome: Met This Shift     Problem: Discharge Planning:  Goal: Discharged to appropriate level of care  Description: Discharged to appropriate level of care  Outcome: Completed     Problem: Mood - Altered:  Goal: Mood stable  Description: Mood stable  Outcome: Completed     Problem: Nausea/Vomiting:  Goal: Absence of nausea/vomiting  Description: Absence of nausea/vomiting  Outcome: Completed     Problem: Venous Thromboembolism:  Goal: Will show no signs or symptoms of venous thromboembolism  Description: Will show no signs or symptoms of venous thromboembolism  Outcome: Completed  Goal: Absence of signs or symptoms of impaired coagulation  Description: Absence of signs or symptoms of impaired coagulation  Outcome: Completed

## 2021-02-09 NOTE — FLOWSHEET NOTE
Pt discharge teaching and instructions completed. Pt verbalizes understanding to follow up in 1 week or before with any problems/concerns.  All questions answered

## 2021-02-09 NOTE — PROGRESS NOTES
SUBJECTIVE:   Patient without complaint    OBJECTIVE:   /76   Pulse 84   Temp 98.4 °F (36.9 °C) (Oral)   Resp 16   Ht 5' 7\" (1.702 m)   Wt (!) 330 lb (149.7 kg)   LMP 05/17/2020   SpO2 95%   Breastfeeding Unknown   BMI 51.69 kg/m²    Incision clean, dry, intact   Abdomen soft, nontender    ASSESSMENT/PLAN:   Postoperative Day #2   Discharge home with precaution after bowel movement   Follow-up 1 week in office   Post operative precautions given   HTN/GDM per hospitalist   S/p panic attack - uses ativan prn at home       Franck Mast 2/9/2021 7:50 AM

## 2021-02-09 NOTE — PROGRESS NOTES
CLINICAL PHARMACY NOTE: MEDS TO [address] Select Patient?: No  Total # of Prescriptions Filled: 2   The following medications were delivered to the patient:  · Nifedipine 30 mg  · Hydrocodone 5-325 mg  Total # of Interventions Completed: 7  Time Spent (min): 75    Additional Documentation:

## 2021-02-09 NOTE — FLOWSHEET NOTE
C/o feeling heart racing and sob sobbing reports history of panic attack vitals taken hr 96 sat . Slow breaths encouraged . Occasional irregular heart beats noted . Dr Hipolito Craig called through perfect serve. Call redirected back to hospital . Who then paged Dr. Miguel Serrato. Notified of above. Orders received. Will do EKG.

## 2021-02-09 NOTE — PROGRESS NOTES
[facility] Progress Note    Admitting Date and Time: 2/5/2021  2:04 PM  Admit Dx: Encounter for induction of labor [Z34.90]    Subjective:  Patient is being followed for Encounter for induction of labor [Z34.90]   Pt feels good, BP is controlled on nifedipine, no HA, lightheadedness of palpiation    ROS: denies fever, chills, cp, sob, n/v, HA unless stated above.       NIFEdipine  30 mg Oral Daily    sodium chloride flush  10 mL Intravenous 2 times per day    pantoprazole  40 mg Oral Daily    rho(D) immune globulin  300 mcg Intramuscular Once    docusate sodium  100 mg Oral BID    ferrous sulfate  325 mg Oral BID WC    prenatal vitamin  1 tablet Oral Daily    measles, mumps & rubella vaccine  0.5 mL Subcutaneous Prior to discharge    Tdap-Dtap  0.5 mL Intramuscular Prior to discharge         HYDROcodone 5 mg - acetaminophen, 1 tablet, Q4H PRN      HYDROcodone 5 mg - acetaminophen, 2 tablet, Q4H PRN      fentanNYL, 25 mcg, Q5 Min PRN      morphine, 2 mg, Q5 Min PRN      sodium chloride flush, 10 mL, PRN      acetaminophen, 650 mg, Q4H PRN      ibuprofen, 800 mg, Q8H PRN      morphine, 2 mg, Q2H PRN    Or      morphine, 4 mg, Q2H PRN      ondansetron, 4 mg, Q6H PRN      ondansetron, 8 mg, Q8H PRN      simethicone, 80 mg, Q6H PRN      bisacodyl, 10 mg, Daily PRN      lanolin, , Q1H PRN         Objective:    /76   Pulse 84   Temp 98.4 °F (36.9 °C) (Oral)   Resp 16   Ht 5' 7\" (1.702 m)   Wt (!) 330 lb (149.7 kg)   LMP 05/17/2020   SpO2 95%   Breastfeeding Unknown   BMI 51.69 kg/m²     General Appearance: alert and oriented to person, place and time and in no acute distress  Skin: warm and dry  Head: normocephalic and atraumatic  Eyes: pupils equal, round, and reactive to light, extraocular eye movements intact, conjunctivae normal  Neck: neck supple and non tender without mass Pulmonary/Chest: clear to auscultation bilaterally- no wheezes, rales or rhonchi, normal air movement, no respiratory distress  Cardiovascular: normal rate, normal S1 and S2 and no carotid bruits  Abdomen: soft, non-tender, non-distended, normal bowel sounds, no masses or organomegaly  Extremities: no cyanosis, no clubbing and +1 edema  Neurologic: no cranial nerve deficit and speech normal        No results for input(s): NA, K, CL, CO2, BUN, CREATININE, GLUCOSE, CALCIUM in the last 72 hours. Recent Labs     02/08/21  0425   WBC 10.2   RBC 3.73   HGB 9.9*   HCT 31.2*   MCV 83.6   MCH 26.5   MCHC 31.7*   RDW 15.7*      MPV 11.4         Assessment:    Principal Problem:    Encounter for induction of labor  Active Problems:    Diet controlled gestational diabetes mellitus (GDM), antepartum    Elevated blood pressure affecting pregnancy in third trimester, antepartum    Essential hypertension  Resolved Problems:    * No resolved hospital problems. *      Plan:  1. Essential HTN, worsened with pregnancy, was on labetalol 300 tid, changed to 150 bid, switched to nifedipine with good control, can be discharged on nifedipine  2. Hx of PCOS, off her metformin, we can restart it back  3. Gestational diabetes, diet controlled, no intervention for now. NOTE: This report was transcribed using voice recognition software. Every effort was made to ensure accuracy; however, inadvertent computerized transcription errors may be present.   Electronically signed by Bee Leonard MD on 2/9/2021 at 10:03 AM

## 2021-02-09 NOTE — LACTATION NOTE
Mom formula fed last night, plans on pumping at home more. Encouraged frequent pumping sessions to establish milk supply. Reviewed benefits and safety of skin to skin. Inst on adequate I/O and importance of keeping track of diapers at home. Instructed on signs of dehydration such as infant refusing to feed, decreased wet diapers and infant becoming listless and notify provider if these occur. Reviewed with mom the importance of notifying the physician if baby looks more jaundiced. Lactation office # given if follow-up needed, as well as other helpful resources. Encouraged to call with any concerns. Support and encouragement given.

## 2021-02-09 NOTE — FLOWSHEET NOTE
ekg done normal sinus rythym. Still sobbing. Support offered. Voicing concerns re: \"taking good care of her\" referring to infant. Reports good support at home. still feels occasional irregular heart beat but feels they are lessening. Does report had irregular beats during pregnancy related to slightly low magnesium levels. Has used ativan in past for panic attacks with success. will monitor.  Fob at bedside

## 2021-02-11 LAB
FENTANYL URINE: <1 NG/ML
NORFENTANYL, URINE: <1 NG/ML

## 2021-02-11 NOTE — DISCHARGE SUMMARY
[address] [address]                               DISCHARGE SUMMARY    PATIENT NAME: Mark Roach                 :        1990  MED REC NO:   [id number]                            ROOM:       0302  ACCOUNT NO:   [de-identified]                           ADMIT DATE: 2021  PROVIDER:     Alex Lopez MD                  [facility] DATE:  2021    BRIEF HISTORY OF PRESENT ILLNESS AND HOSPITAL COURSE:  She was admitted  on  for induction of labor from [facility] office at 40 and 5/7th weeks  due to hypertension and labile blood pressures as well as blood sugars. Recommended delivery per Dr. Holger Arshad. Cytotec was placed five times. Pitocin was started. She spontaneously ruptured overnight. She  progressed to 3 to 4 cm for greater than 6 hours. We were unable to get  adequate contractions due to the fact that Pitocin was maxed out. Internal monitoring was placed after observation for a period time.  section was consented, delivering a female infant, 6 pounds and  3 ounces, Apgars 8 and 8. On postpartum day 1, she had no complaints,  vital signs were stable, afebrile. Her care was advanced. She had  blood pressures of 135/83. Hospitalist was following. They restarted  her labetalol and monitoring her blood sugars. On day 2, she had no  complaints and she was discharged home with precautions after a panic  attack, and continue her blood pressure medications and instructions to  follow up in one week in the office for blood pressure check. PHYSICIANS FOLLOWING THE PATIENT:  Alex Lopez MD, and  hospitalist.    LEVEL OF FUNCTION:  Stable. CAREGIVERS:  Self and family. SPECIAL PROBLEMS:  Hypertension and gestational diabetes. DISCHARGE INSTRUCTIONS:  Follow up in the office in one week, and  continue labetalol.         Kate Santos MD D: 02/11/2021 0:04:07       T: 02/11/2021 2:07:33     [email or web address]  Job#: [id number]     Doc#: [id number]    CC:

## 2021-02-15 ENCOUNTER — HOSPITAL ENCOUNTER (OUTPATIENT)
Age: 31
Discharge: HOME | End: 2021-02-15
Payer: COMMERCIAL

## 2021-02-15 LAB
ALBUMIN SERPL-MCNC: 3.8 G/DL (ref 3.5–5.2)
ALP BLD-CCNC: 105 U/L (ref 35–104)
ALT SERPL-CCNC: 28 U/L (ref 0–32)
ANION GAP SERPL CALCULATED.3IONS-SCNC: 10 MMOL/L (ref 7–16)
AST SERPL-CCNC: 21 U/L (ref 0–31)
BILIRUB SERPL-MCNC: 0.2 MG/DL (ref 0–1.2)
BUN BLDV-MCNC: 11 MG/DL (ref 6–20)
CALCIUM SERPL-MCNC: 9.1 MG/DL (ref 8.6–10.2)
CHLORIDE BLD-SCNC: 104 MMOL/L (ref 98–107)
CO2: 23 MMOL/L (ref 22–29)
CREAT SERPL-MCNC: 0.8 MG/DL (ref 0.5–1)
GFR AFRICAN AMERICAN: >60
GFR NON-AFRICAN AMERICAN: >60 ML/MIN/1.73
GLUCOSE BLD-MCNC: 97 MG/DL (ref 74–99)
HBA1C MFR BLD: 5.5 % (ref 4–5.6)
HCT VFR BLD CALC: 35 % (ref 34–48)
HEMOGLOBIN: 10.9 G/DL (ref 11.5–15.5)
MCH RBC QN AUTO: 26.1 PG (ref 26–35)
MCHC RBC AUTO-ENTMCNC: 31.1 % (ref 32–34.5)
MCV RBC AUTO: 83.7 FL (ref 80–99.9)
PDW BLD-RTO: 15.3 FL (ref 11.5–15)
PLATELET # BLD: 393 E9/L (ref 130–450)
PMV BLD AUTO: 10 FL (ref 7–12)
POTASSIUM SERPL-SCNC: 4.1 MMOL/L (ref 3.5–5)
RBC # BLD: 4.18 E12/L (ref 3.5–5.5)
SODIUM BLD-SCNC: 137 MMOL/L (ref 132–146)
TOTAL PROTEIN: 6.8 G/DL (ref 6.4–8.3)
WBC # BLD: 9.6 E9/L (ref 4.5–11.5)

## 2021-02-15 PROCEDURE — 85027 COMPLETE CBC AUTOMATED: CPT

## 2021-02-15 PROCEDURE — 83036 HEMOGLOBIN GLYCOSYLATED A1C: CPT

## 2021-02-15 PROCEDURE — 80053 COMPREHEN METABOLIC PANEL: CPT

## 2021-02-15 PROCEDURE — 36415 COLL VENOUS BLD VENIPUNCTURE: CPT

## 2021-06-10 ENCOUNTER — HOSPITAL ENCOUNTER (OUTPATIENT)
Age: 31
Discharge: HOME | End: 2021-06-10
Payer: COMMERCIAL

## 2021-06-10 LAB
C-REACTIVE PROTEIN: 0.6 MG/DL (ref 0–0.4)
RHEUMATOID FACTOR: <10 IU/ML (ref 0–13)
SEDIMENTATION RATE, ERYTHROCYTE: 9 MM/HR (ref 0–20)

## 2021-06-10 PROCEDURE — 36415 COLL VENOUS BLD VENIPUNCTURE: CPT

## 2021-06-10 PROCEDURE — 86038 ANTINUCLEAR ANTIBODIES: CPT

## 2021-06-10 PROCEDURE — 86140 C-REACTIVE PROTEIN: CPT

## 2021-06-10 PROCEDURE — 86618 LYME DISEASE ANTIBODY: CPT

## 2021-06-10 PROCEDURE — 86431 RHEUMATOID FACTOR QUANT: CPT

## 2021-06-10 PROCEDURE — 85651 RBC SED RATE NONAUTOMATED: CPT

## 2021-06-11 LAB — ANTI-NUCLEAR ANTIBODY (ANA): NEGATIVE

## 2021-06-12 LAB
LYME (B. BURGDORFERI) AB IGG WB: NEGATIVE
LYME AB IGM BY WB:: NEGATIVE

## 2021-10-22 ENCOUNTER — HOSPITAL ENCOUNTER (OUTPATIENT)
Dept: GENERAL RADIOLOGY | Age: 31
Discharge: HOME | End: 2021-10-24
Payer: COMMERCIAL

## 2021-10-22 ENCOUNTER — HOSPITAL ENCOUNTER (OUTPATIENT)
Age: 31
Discharge: HOME | End: 2021-10-24
Payer: COMMERCIAL

## 2021-10-22 DIAGNOSIS — M25.571 PAIN IN JOINT OF RIGHT FOOT: ICD-10-CM

## 2021-10-22 DIAGNOSIS — M25.571 PAIN IN JOINT OF RIGHT FOOT: Primary | ICD-10-CM

## 2021-10-22 PROCEDURE — 73630 X-RAY EXAM OF FOOT: CPT

## 2021-10-25 RX ORDER — PREDNISONE 10 MG/1
10 TABLET ORAL DAILY
Qty: 18 TABLET | Refills: 1 | Status: SHIPPED | OUTPATIENT
Start: 2021-10-25 | End: 2021-11-03

## 2021-11-22 ENCOUNTER — OFFICE VISIT (OUTPATIENT)
Dept: PODIATRY | Age: 31
End: 2021-11-22
Payer: COMMERCIAL

## 2021-11-22 VITALS
DIASTOLIC BLOOD PRESSURE: 84 MMHG | SYSTOLIC BLOOD PRESSURE: 144 MMHG | TEMPERATURE: 97.2 F | WEIGHT: 293 LBS | BODY MASS INDEX: 51.69 KG/M2

## 2021-11-22 DIAGNOSIS — S90.31XD CONTUSION OF RIGHT FOOT, SUBSEQUENT ENCOUNTER: Primary | ICD-10-CM

## 2021-11-22 PROCEDURE — 99212 OFFICE O/P EST SF 10 MIN: CPT | Performed by: PODIATRIST

## 2021-11-22 RX ORDER — ETODOLAC 400 MG/1
400 TABLET, FILM COATED ORAL 2 TIMES DAILY
Qty: 180 TABLET | Refills: 1 | Status: SHIPPED
Start: 2021-11-22 | End: 2022-02-08

## 2021-11-22 RX ORDER — HYDROCHLOROTHIAZIDE 25 MG/1
25 TABLET ORAL DAILY
COMMUNITY

## 2021-11-22 RX ORDER — CARVEDILOL 25 MG/1
TABLET ORAL DAILY
COMMUNITY
Start: 2021-10-03

## 2021-11-22 RX ORDER — LOSARTAN POTASSIUM 100 MG/1
100 TABLET ORAL DAILY
COMMUNITY

## 2021-11-22 NOTE — LETTER
[address] [place] [address]  [address]  Phone: [phone]  [facility] [place]        November 22, 2021     Patient: Sheyla Pak   YOB: 1990   Date of Visit: 11/22/2021       To Whom it May Concern:    Wojciech Arredondo was seen in my clinic on 11/22/2021. She must wear cam walker at work at all time until further notice per Dr. Doreen Dubose with no other restrictions noted pt is released to work full duty     If you have any questions or concerns, please don't hesitate to call.     Sincerely,         Tj Almaraz DPM

## 2022-01-11 ENCOUNTER — APPOINTMENT (OUTPATIENT)
Dept: ULTRASOUND IMAGING | Age: 32
End: 2022-01-11
Payer: COMMERCIAL

## 2022-01-11 ENCOUNTER — APPOINTMENT (OUTPATIENT)
Dept: CT IMAGING | Age: 32
End: 2022-01-11
Payer: COMMERCIAL

## 2022-01-11 ENCOUNTER — HOSPITAL ENCOUNTER (EMERGENCY)
Age: 32
Discharge: HOME | End: 2022-01-11
Attending: EMERGENCY MEDICINE
Payer: COMMERCIAL

## 2022-01-11 VITALS
HEART RATE: 90 BPM | RESPIRATION RATE: 16 BRPM | DIASTOLIC BLOOD PRESSURE: 75 MMHG | HEIGHT: 67 IN | WEIGHT: 293 LBS | OXYGEN SATURATION: 98 % | TEMPERATURE: 98.6 F | SYSTOLIC BLOOD PRESSURE: 145 MMHG | BODY MASS INDEX: 45.99 KG/M2

## 2022-01-11 DIAGNOSIS — R10.12 LEFT UPPER QUADRANT ABDOMINAL PAIN: ICD-10-CM

## 2022-01-11 DIAGNOSIS — R11.2 NAUSEA AND VOMITING, INTRACTABILITY OF VOMITING NOT SPECIFIED, UNSPECIFIED VOMITING TYPE: Primary | ICD-10-CM

## 2022-01-11 LAB
ALBUMIN SERPL-MCNC: 4.3 G/DL (ref 3.5–5.2)
ALP BLD-CCNC: 87 U/L (ref 35–104)
ALT SERPL-CCNC: 28 U/L (ref 0–32)
ANION GAP SERPL CALCULATED.3IONS-SCNC: 18 MMOL/L (ref 7–16)
AST SERPL-CCNC: 29 U/L (ref 0–31)
BACTERIA: ABNORMAL /HPF
BASOPHILS ABSOLUTE: 0.04 E9/L (ref 0–0.2)
BASOPHILS RELATIVE PERCENT: 0.3 % (ref 0–2)
BILIRUB SERPL-MCNC: 0.6 MG/DL (ref 0–1.2)
BILIRUBIN URINE: ABNORMAL
BLOOD, URINE: NEGATIVE
BUN BLDV-MCNC: 13 MG/DL (ref 6–20)
CALCIUM SERPL-MCNC: 9.4 MG/DL (ref 8.6–10.2)
CHLORIDE BLD-SCNC: 96 MMOL/L (ref 98–107)
CLARITY: CLEAR
CO2: 21 MMOL/L (ref 22–29)
COLOR: YELLOW
CREAT SERPL-MCNC: 0.8 MG/DL (ref 0.5–1)
EOSINOPHILS ABSOLUTE: 0.13 E9/L (ref 0.05–0.5)
EOSINOPHILS RELATIVE PERCENT: 0.9 % (ref 0–6)
EPITHELIAL CELLS, UA: ABNORMAL /HPF
GFR AFRICAN AMERICAN: >60
GFR NON-AFRICAN AMERICAN: >60 ML/MIN/1.73
GLUCOSE BLD-MCNC: 156 MG/DL (ref 74–99)
GLUCOSE URINE: NEGATIVE MG/DL
HCG, URINE, POC: NEGATIVE
HCT VFR BLD CALC: 47.8 % (ref 34–48)
HEMOGLOBIN: 14.6 G/DL (ref 11.5–15.5)
IMMATURE GRANULOCYTES #: 0.05 E9/L
IMMATURE GRANULOCYTES %: 0.3 % (ref 0–5)
KETONES, URINE: NEGATIVE MG/DL
LACTIC ACID: 2.9 MMOL/L (ref 0.5–2.2)
LEUKOCYTE ESTERASE, URINE: NEGATIVE
LYMPHOCYTES ABSOLUTE: 1.61 E9/L (ref 1.5–4)
LYMPHOCYTES RELATIVE PERCENT: 10.9 % (ref 20–42)
Lab: NORMAL
MCH RBC QN AUTO: 26.6 PG (ref 26–35)
MCHC RBC AUTO-ENTMCNC: 30.5 % (ref 32–34.5)
MCV RBC AUTO: 87.2 FL (ref 80–99.9)
MONOCYTES ABSOLUTE: 1.21 E9/L (ref 0.1–0.95)
MONOCYTES RELATIVE PERCENT: 8.2 % (ref 2–12)
NEGATIVE QC PASS/FAIL: NORMAL
NEUTROPHILS ABSOLUTE: 11.78 E9/L (ref 1.8–7.3)
NEUTROPHILS RELATIVE PERCENT: 79.4 % (ref 43–80)
NITRITE, URINE: NEGATIVE
PDW BLD-RTO: 14.1 FL (ref 11.5–15)
PH UA: 6 (ref 5–9)
PLATELET # BLD: 336 E9/L (ref 130–450)
PMV BLD AUTO: 10.2 FL (ref 7–12)
POSITIVE QC PASS/FAIL: NORMAL
POTASSIUM SERPL-SCNC: 3.6 MMOL/L (ref 3.5–5)
PRO-BNP: 6 PG/ML (ref 0–125)
PROTEIN UA: 30 MG/DL
RBC # BLD: 5.48 E12/L (ref 3.5–5.5)
RBC UA: ABNORMAL /HPF (ref 0–2)
SODIUM BLD-SCNC: 135 MMOL/L (ref 132–146)
SPECIFIC GRAVITY UA: >=1.03 (ref 1–1.03)
TOTAL PROTEIN: 7.6 G/DL (ref 6.4–8.3)
TROPONIN, HIGH SENSITIVITY: <6 NG/L (ref 0–9)
UROBILINOGEN, URINE: 0.2 E.U./DL
WBC # BLD: 14.8 E9/L (ref 4.5–11.5)
WBC UA: ABNORMAL /HPF (ref 0–5)

## 2022-01-11 PROCEDURE — 74177 CT ABD & PELVIS W/CONTRAST: CPT

## 2022-01-11 PROCEDURE — 71275 CT ANGIOGRAPHY CHEST: CPT

## 2022-01-11 PROCEDURE — 2580000003 HC RX 258: Performed by: EMERGENCY MEDICINE

## 2022-01-11 PROCEDURE — 80053 COMPREHEN METABOLIC PANEL: CPT

## 2022-01-11 PROCEDURE — 83880 ASSAY OF NATRIURETIC PEPTIDE: CPT

## 2022-01-11 PROCEDURE — 6360000002 HC RX W HCPCS: Performed by: EMERGENCY MEDICINE

## 2022-01-11 PROCEDURE — 81001 URINALYSIS AUTO W/SCOPE: CPT

## 2022-01-11 PROCEDURE — 99285 EMERGENCY DEPT VISIT HI MDM: CPT

## 2022-01-11 PROCEDURE — 6360000004 HC RX CONTRAST MEDICATION: Performed by: RADIOLOGY

## 2022-01-11 PROCEDURE — 93975 VASCULAR STUDY: CPT

## 2022-01-11 PROCEDURE — 6370000000 HC RX 637 (ALT 250 FOR IP)

## 2022-01-11 PROCEDURE — 83605 ASSAY OF LACTIC ACID: CPT

## 2022-01-11 PROCEDURE — 96374 THER/PROPH/DIAG INJ IV PUSH: CPT

## 2022-01-11 PROCEDURE — 85025 COMPLETE CBC W/AUTO DIFF WBC: CPT

## 2022-01-11 PROCEDURE — 93005 ELECTROCARDIOGRAM TRACING: CPT | Performed by: EMERGENCY MEDICINE

## 2022-01-11 PROCEDURE — 96375 TX/PRO/DX INJ NEW DRUG ADDON: CPT

## 2022-01-11 PROCEDURE — 76856 US EXAM PELVIC COMPLETE: CPT

## 2022-01-11 PROCEDURE — 96372 THER/PROPH/DIAG INJ SC/IM: CPT

## 2022-01-11 PROCEDURE — 84484 ASSAY OF TROPONIN QUANT: CPT

## 2022-01-11 PROCEDURE — 6360000002 HC RX W HCPCS: Performed by: PHYSICIAN ASSISTANT

## 2022-01-11 RX ORDER — METOPROLOL TARTRATE 5 MG/5ML
5 INJECTION INTRAVENOUS ONCE
Status: DISCONTINUED | OUTPATIENT
Start: 2022-01-11 | End: 2022-01-11 | Stop reason: HOSPADM

## 2022-01-11 RX ORDER — ONDANSETRON 4 MG/1
4 TABLET, ORALLY DISINTEGRATING ORAL EVERY 8 HOURS PRN
Qty: 20 TABLET | Refills: 0 | Status: SHIPPED | OUTPATIENT
Start: 2022-01-11 | End: 2022-02-08

## 2022-01-11 RX ORDER — ONDANSETRON 2 MG/ML
4 INJECTION INTRAMUSCULAR; INTRAVENOUS ONCE
Status: COMPLETED | OUTPATIENT
Start: 2022-01-11 | End: 2022-01-11

## 2022-01-11 RX ORDER — FENTANYL CITRATE 50 UG/ML
50 INJECTION, SOLUTION INTRAMUSCULAR; INTRAVENOUS ONCE
Status: COMPLETED | OUTPATIENT
Start: 2022-01-11 | End: 2022-01-11

## 2022-01-11 RX ORDER — ONDANSETRON 4 MG/1
4 TABLET, ORALLY DISINTEGRATING ORAL ONCE
Status: COMPLETED | OUTPATIENT
Start: 2022-01-11 | End: 2022-01-11

## 2022-01-11 RX ORDER — 0.9 % SODIUM CHLORIDE 0.9 %
1000 INTRAVENOUS SOLUTION INTRAVENOUS ONCE
Status: COMPLETED | OUTPATIENT
Start: 2022-01-11 | End: 2022-01-11

## 2022-01-11 RX ORDER — DICYCLOMINE HCL 20 MG
20 TABLET ORAL 4 TIMES DAILY PRN
Qty: 30 TABLET | Refills: 0 | Status: SHIPPED | OUTPATIENT
Start: 2022-01-11 | End: 2022-02-08

## 2022-01-11 RX ORDER — PROMETHAZINE HYDROCHLORIDE 25 MG/ML
25 INJECTION, SOLUTION INTRAMUSCULAR; INTRAVENOUS ONCE
Status: COMPLETED | OUTPATIENT
Start: 2022-01-11 | End: 2022-01-11

## 2022-01-11 RX ORDER — ONDANSETRON 4 MG/1
TABLET, ORALLY DISINTEGRATING ORAL
Status: COMPLETED
Start: 2022-01-11 | End: 2022-01-11

## 2022-01-11 RX ORDER — DICYCLOMINE HYDROCHLORIDE 10 MG/ML
20 INJECTION INTRAMUSCULAR ONCE
Status: COMPLETED | OUTPATIENT
Start: 2022-01-11 | End: 2022-01-11

## 2022-01-11 RX ADMIN — IOPAMIDOL 90 ML: 755 INJECTION, SOLUTION INTRAVENOUS at 11:54

## 2022-01-11 RX ADMIN — ONDANSETRON 4 MG: 2 INJECTION INTRAMUSCULAR; INTRAVENOUS at 16:11

## 2022-01-11 RX ADMIN — HYDROMORPHONE HYDROCHLORIDE 1 MG: 1 INJECTION, SOLUTION INTRAMUSCULAR; INTRAVENOUS; SUBCUTANEOUS at 09:51

## 2022-01-11 RX ADMIN — SODIUM CHLORIDE 1000 ML: 9 INJECTION, SOLUTION INTRAVENOUS at 14:04

## 2022-01-11 RX ADMIN — SODIUM CHLORIDE 1000 ML: 9 INJECTION, SOLUTION INTRAVENOUS at 05:55

## 2022-01-11 RX ADMIN — FENTANYL CITRATE 50 MCG: 50 INJECTION, SOLUTION INTRAMUSCULAR; INTRAVENOUS at 05:55

## 2022-01-11 RX ADMIN — ONDANSETRON 4 MG: 4 TABLET, ORALLY DISINTEGRATING ORAL at 01:56

## 2022-01-11 RX ADMIN — PROMETHAZINE HYDROCHLORIDE 25 MG: 25 INJECTION INTRAMUSCULAR; INTRAVENOUS at 02:20

## 2022-01-11 RX ADMIN — DICYCLOMINE HYDROCHLORIDE 20 MG: 20 INJECTION, SOLUTION INTRAMUSCULAR at 15:40

## 2022-01-11 RX ADMIN — IOPAMIDOL 75 ML: 755 INJECTION, SOLUTION INTRAVENOUS at 04:52

## 2022-01-11 ASSESSMENT — ENCOUNTER SYMPTOMS
COUGH: 0
NAUSEA: 1
WHEEZING: 0
SORE THROAT: 0
DIARRHEA: 0
VOMITING: 1
SHORTNESS OF BREATH: 0
EYE REDNESS: 0
ABDOMINAL PAIN: 1
EYE PAIN: 0
EYE DISCHARGE: 0
BACK PAIN: 0

## 2022-01-11 ASSESSMENT — PAIN DESCRIPTION - PAIN TYPE: TYPE: ACUTE PAIN

## 2022-01-11 ASSESSMENT — PAIN SCALES - GENERAL
PAINLEVEL_OUTOF10: 6
PAINLEVEL_OUTOF10: 10

## 2022-01-11 ASSESSMENT — PAIN DESCRIPTION - FREQUENCY: FREQUENCY: INTERMITTENT

## 2022-01-11 ASSESSMENT — PAIN DESCRIPTION - ORIENTATION: ORIENTATION: LEFT

## 2022-01-11 ASSESSMENT — PAIN DESCRIPTION - LOCATION: LOCATION: ABDOMEN

## 2022-01-11 NOTE — ED NOTES
Report given to Cass Lares RN and [facility] LPN.  Care of patient relinquished     Marj Pringle RN  01/11/22 [id number]

## 2022-01-11 NOTE — ED NOTES
Pt and family aware that we are awaiting IV team to place line to obtain new labs and establish access for meds/fluids. No concerns at this time.  Pt cooperative, call light in reach     Alice Delacruz RN  01/11/22 [id number]

## 2022-01-11 NOTE — ED NOTES
This RN in to medicate patient. Patient in ultrasound at this time.      Marcela Quintero, MICHELET  01/11/22 4539

## 2022-01-11 NOTE — ED NOTES
Assumed care of patient. Introduced self as RN, patient requesting pain meds at this time. Will inform provider and continue to monitor.      Mica Heck RN  01/11/22 [id number]

## 2022-01-11 NOTE — ED NOTES
Department of Emergency Medicine  FIRST PROVIDER TRIAGE NOTE             Independent MLP           1/11/22  1:53 AM EST    Date of Encounter: 1/11/22   MRN: [id number]      HPI: Carson Hagen is a 32 y.o. female who presents to the ED for Abdominal Pain (left sided pain for couple days. ), Nausea, and Emesis  Pt presents with 2 day hx of LLQ ab pain. Pt had sharp pain at beginning and thought probably had ruptured cyst (has hx of this). However, she started developing chills, nausea and vomiting. ROS: Negative for cp, sob, back pain, fever or urinary complaints. PE: Gen Appearance/Constitutional: alert  CV: tachycardia  Pulm: CTA bilat  GI: tender to palpation LLQ deep pelvis. Initial Plan of Care: All treatment areas with department are currently occupied. Plan to order/Initiate the following while awaiting opening in ED: labs, imaging studies, analgesics and antiemetics. , ODT zofran provided in triage, pt has continued to retch and heave after this and IM phenergan administered at triage area. .    Initial Plan of Care: Initiate Treatment-Testing, Proceed toTreatment Area When Bed Available for ED Attending/MLP to Continue Care    Electronically signed by Gustavo Frias PA-C   DD: 1/11/22         Gustavo Frias PA-C  01/11/22 ROBERT Burns  01/11/22 [id number]

## 2022-01-11 NOTE — ED PROVIDER NOTES
HPI   Patient is a 32 y.o. female with a past medical history of hypertension, PCOS, presenting to the Emergency Department for abdominal pain, nausea, vomiting. History obtained by patient. Symptoms are moderate in severity and persistent since onset. They are improved by time and worsened by nothing. Patient presents for abdominal pain. She states starting on Saturday she had a sudden onset of sharp stabbing abdominal pain in her left lower quadrant. She states it lasted a few seconds and resolved. She has a history of PCOS and states she has multiple ovarian cysts. She states they have ruptured multiple times in the past.  She states this feels like a prior cyst rupture. That sharp pain improved after a few minutes. She now only has a dull ache to the left side of her abdomen and it has been constant since. Starting yesterday she had multiple episodes of nonbloody, nonbilious emesis. She states very nauseous. Denies any pain in her chest.  She has no diarrhea or constipation. Denies any recent sick contacts    Review of Systems   Constitutional: Negative for chills and fever. HENT: Negative for congestion and sore throat. Eyes: Negative for pain, discharge and redness. Respiratory: Negative for cough, shortness of breath and wheezing. Cardiovascular: Negative for chest pain. Gastrointestinal: Positive for abdominal pain, nausea and vomiting. Negative for diarrhea. Genitourinary: Negative for dysuria, flank pain, frequency, pelvic pain, vaginal bleeding and vaginal discharge. Musculoskeletal: Negative for arthralgias and back pain. Skin: Negative for rash and wound. Neurological: Negative for weakness and headaches. All other systems reviewed and are negative. Physical Exam  Vitals and nursing note reviewed. Constitutional:       General: She is not in acute distress. Appearance: She is well-developed. She is not ill-appearing.    HENT:      Head: Normocephalic and atraumatic. Eyes:      Pupils: Pupils are equal, round, and reactive to light. Cardiovascular:      Rate and Rhythm: Normal rate and regular rhythm. Pulmonary:      Effort: Pulmonary effort is normal. No respiratory distress. Breath sounds: Normal breath sounds. No wheezing or rales. Abdominal:      General: Bowel sounds are normal.      Palpations: Abdomen is soft. Tenderness: There is abdominal tenderness in the left lower quadrant. There is no guarding or rebound. Musculoskeletal:      Cervical back: Normal range of motion and neck supple. Skin:     General: Skin is warm and dry. Capillary Refill: Capillary refill takes less than 2 seconds. Neurological:      General: No focal deficit present. Mental Status: She is alert and oriented to person, place, and time. Cranial Nerves: No cranial nerve deficit. Coordination: Coordination normal.          Procedures     MDM   Patient presented to the Emergency Department for abdominal pain. They are clinically stable, vital signs stable, non toxic appearing. Abdominal exam reassuring, nonperitoneal.  Labs and imaging obtained. Supportive care given. Labs concerning for some mild dehydration. She has been vomiting. Vomiting controlled after Phenergan. IV fluids given. Mild lactic acidosis. No concern for DKA. Abdominal labs reassuring. Mild leukocytosis which could be stress induced. No fevers. CT with hepatic fatty infiltration but no other acute process. She remains nonperitoneal.  Ultrasound also performed as she does have a history of PCOS. She has good flow, no concern for torsion. Ultrasound reassuring. Patient with no chest pain, dyspnea or other cardiac complaints when I evaluated, no concern for cardiac or pulm etiology. . No concern for ACS or other cardiac/pulm etiology  Patient signed out to oncoming day team pending ultrasound and symptom control.   Educated patient about symptoms and imaging up to this 87.2 80.0 - 99.9 fL    MCH 26.6 26.0 - 35.0 pg    MCHC 30.5 (L) 32.0 - 34.5 %    RDW 14.1 11.5 - 15.0 fL    Platelets 385 859 - 293 E9/L    MPV 10.2 7.0 - 12.0 fL    Neutrophils % 79.4 43.0 - 80.0 %    Immature Granulocytes % 0.3 0.0 - 5.0 %    Lymphocytes % 10.9 (L) 20.0 - 42.0 %    Monocytes % 8.2 2.0 - 12.0 %    Eosinophils % 0.9 0.0 - 6.0 %    Basophils % 0.3 0.0 - 2.0 %    Neutrophils Absolute 11.78 (H) 1.80 - 7.30 E9/L    Immature Granulocytes # 0.05 E9/L    Lymphocytes Absolute 1.61 1.50 - 4.00 E9/L    Monocytes Absolute 1.21 (H) 0.10 - 0.95 E9/L    Eosinophils Absolute 0.13 0.05 - 0.50 E9/L    Basophils Absolute 0.04 0.00 - 0.20 E9/L   Comprehensive Metabolic Panel   Result Value Ref Range    Sodium 135 132 - 146 mmol/L    Potassium 3.6 3.5 - 5.0 mmol/L    Chloride 96 (L) 98 - 107 mmol/L    CO2 21 (L) 22 - 29 mmol/L    Anion Gap 18 (H) 7 - 16 mmol/L    Glucose 156 (H) 74 - 99 mg/dL    BUN 13 6 - 20 mg/dL    CREATININE 0.8 0.5 - 1.0 mg/dL    GFR Non-African American >60 >=60 mL/min/1.73    GFR African American >60     Calcium 9.4 8.6 - 10.2 mg/dL    Total Protein 7.6 6.4 - 8.3 g/dL    Albumin 4.3 3.5 - 5.2 g/dL    Total Bilirubin 0.6 0.0 - 1.2 mg/dL    Alkaline Phosphatase 87 35 - 104 U/L    ALT 28 0 - 32 U/L    AST 29 0 - 31 U/L   Urinalysis   Result Value Ref Range    Color, UA Yellow Straw/Yellow    Clarity, UA Clear Clear    Glucose, Ur Negative Negative mg/dL    Bilirubin Urine MODERATE (A) Negative    Ketones, Urine Negative Negative mg/dL    Specific Gravity, UA >=1.030 1.005 - 1.030    Blood, Urine Negative Negative    pH, UA 6.0 5.0 - 9.0    Protein, UA 30 (A) Negative mg/dL    Urobilinogen, Urine 0.2 <2.0 E.U./dL    Nitrite, Urine Negative Negative    Leukocyte Esterase, Urine Negative Negative   Lactic Acid, Plasma   Result Value Ref Range    Lactic Acid 2.9 (H) 0.5 - 2.2 mmol/L   Microscopic Urinalysis   Result Value Ref Range    WBC, UA NONE 0 - 5 /HPF    RBC, UA NONE 0 - 2 /HPF    Epithelial Cells, UA MODERATE /HPF    Bacteria, UA MODERATE (A) None Seen /HPF   Troponin   Result Value Ref Range    Troponin, High Sensitivity <6 0 - 9 ng/L   Brain Natriuretic Peptide   Result Value Ref Range    Pro-BNP 6 0 - 125 pg/mL   POC Pregnancy Urine Qual   Result Value Ref Range    HCG, Urine, POC Negative Negative    Lot Number [id number]     Positive QC Pass/Fail Pass     Negative QC Pass/Fail Pass    EKG 12 Lead   Result Value Ref Range    Ventricular Rate 128 BPM    Atrial Rate 128 BPM    P-R Interval 146 ms    QRS Duration 90 ms    Q-T Interval 308 ms    QTc Calculation (Bazett) 449 ms    P Axis 43 degrees    R Axis -6 degrees    T Axis 35 degrees       Radiology:  CTA PULMONARY W CONTRAST   Final Result   1. There is no pulmonary embolus   2. There are no findings of atypical or COVID pneumonia   3. Hepatic steatosis. US DUP ABD PEL RETRO SCROT COMPLETE   Final Result   Normal pelvic ultrasound. No free fluid or acute pelvic disease identified. RECOMMENDATIONS:   Unavailable         US PELVIS COMPLETE   Final Result   Normal pelvic ultrasound. No free fluid or acute pelvic disease identified. RECOMMENDATIONS:   Unavailable         CT ABDOMEN PELVIS W IV CONTRAST Additional Contrast? None   Final Result   Hepatic fatty infiltration. No acute process otherwise identified. Short-term follow-up recommended if   symptoms persist.                 ------------------------- NURSING NOTES AND VITALS REVIEWED ---------------------------  Date / Time Roomed:  1/11/2022  4:30 AM  ED Bed Assignment:  02/02    The nursing notes within the ED encounter and vital signs as below have been reviewed.    BP (!) 145/75   Pulse 90   Temp 98.6 °F (37 °C) (Oral)   Resp 16   Ht 5' 7\" (1.702 m)   Wt (!) 350 lb (158.8 kg)   SpO2 98%   BMI 54.82 kg/m²   Oxygen Saturation Interpretation: Normal      ------------------------------------------ PROGRESS NOTES ------------------------------------------  ED COURSE MEDICATIONS:                Medications   ondansetron (ZOFRAN-ODT) disintegrating tablet 4 mg (4 mg Oral Given 1/11/22 0156)   promethazine (PHENERGAN) injection 25 mg (25 mg IntraMUSCular Given 1/11/22 0220)   iopamidol (ISOVUE-370) 76 % injection 75 mL (75 mLs IntraVENous Given 1/11/22 0452)   fentaNYL (SUBLIMAZE) injection 50 mcg (50 mcg IntraVENous Given 1/11/22 0555)   0.9 % sodium chloride bolus (0 mLs IntraVENous Stopped 1/11/22 1126)   HYDROmorphone (DILAUDID) injection 1 mg (1 mg IntraVENous Given 1/11/22 0951)   0.9 % sodium chloride bolus (0 mLs IntraVENous Stopped 1/11/22 1714)   iopamidol (ISOVUE-370) 76 % injection 90 mL (90 mLs IntraVENous Given 1/11/22 1154)   dicyclomine (BENTYL) injection 20 mg (20 mg IntraMUSCular Given 1/11/22 1540)   ondansetron (ZOFRAN) injection 4 mg (4 mg IntraVENous Given 1/11/22 1611)       I have spoken with the patient and discussed todays results, in addition to providing specific details for the plan of care and counseling regarding the diagnosis and prognosis pending US and pain/nausea control. Their questions are answered at this time and they are agreeable with the plan. I discussed at length with them reasons for immediate return here for re evaluation pending they go home They will followup with primary care by calling their office tomorrow. --------------------------------- ADDITIONAL PROVIDER NOTES ---------------------------------  At this time the patient is without objective evidence of an acute process requiring hospitalization or inpatient management pending US. They have remained hemodynamically stable throughout their entire ED visit and are stable for discharge with outpatient follow-up pending US. The plan has been discussed in detail and they are aware of the specific conditions for emergent return, as well as the importance of follow-up.       Discharge Medication List as of 1/11/2022  4:09 PM      START taking these medications    Details dicyclomine (BENTYL) 20 MG tablet Take 1 tablet by mouth 4 times daily as needed (Abd Cramps), Disp-30 tablet, R-0Print      ondansetron (ZOFRAN ODT) 4 MG disintegrating tablet Place 1 tablet under the tongue every 8 hours as needed for Nausea or Vomiting, Disp-20 tablet, R-0Print             Diagnosis:  1. Nausea and vomiting, intractability of vomiting not specified, unspecified vomiting type    2. Left upper quadrant abdominal pain        Disposition:  Patient's disposition: Discharge to home pending US and symptom control  Patient's condition is stable.         Avani King,   Resident  01/12/22 2029

## 2022-01-12 LAB
EKG ATRIAL RATE: 128 BPM
EKG P AXIS: 43 DEGREES
EKG P-R INTERVAL: 146 MS
EKG Q-T INTERVAL: 308 MS
EKG QRS DURATION: 90 MS
EKG QTC CALCULATION (BAZETT): 449 MS
EKG R AXIS: -6 DEGREES
EKG T AXIS: 35 DEGREES
EKG VENTRICULAR RATE: 128 BPM

## 2022-02-01 ENCOUNTER — OFFICE VISIT (OUTPATIENT)
Dept: FAMILY MEDICINE CLINIC | Age: 32
End: 2022-02-01
Payer: COMMERCIAL

## 2022-02-01 VITALS
DIASTOLIC BLOOD PRESSURE: 76 MMHG | SYSTOLIC BLOOD PRESSURE: 128 MMHG | WEIGHT: 293 LBS | OXYGEN SATURATION: 96 % | RESPIRATION RATE: 20 BRPM | BODY MASS INDEX: 45.99 KG/M2 | TEMPERATURE: 97.3 F | HEIGHT: 67 IN | HEART RATE: 86 BPM

## 2022-02-01 DIAGNOSIS — J06.9 URI WITH COUGH AND CONGESTION: Primary | ICD-10-CM

## 2022-02-01 PROCEDURE — 99213 OFFICE O/P EST LOW 20 MIN: CPT | Performed by: NURSE PRACTITIONER

## 2022-02-01 RX ORDER — CEFDINIR 300 MG/1
300 CAPSULE ORAL 2 TIMES DAILY
Qty: 20 CAPSULE | Refills: 0 | Status: SHIPPED | OUTPATIENT
Start: 2022-02-01 | End: 2022-02-11

## 2022-02-01 RX ORDER — LOSARTAN POTASSIUM 50 MG/1
TABLET ORAL
COMMUNITY
Start: 2021-11-24 | End: 2022-02-01 | Stop reason: SDUPTHER

## 2022-02-01 RX ORDER — OMEPRAZOLE 40 MG/1
CAPSULE, DELAYED RELEASE ORAL
COMMUNITY
Start: 2022-01-14 | End: 2022-07-06

## 2022-02-01 RX ORDER — FAMOTIDINE 40 MG/1
TABLET, FILM COATED ORAL
COMMUNITY
Start: 2022-01-14 | End: 2022-07-06

## 2022-02-01 NOTE — PROGRESS NOTES
Chief Complaint:   Pharyngitis and Otalgia (Left)    History of Present Illness   Source of history provided by:  patient. Amanda Sheldon is a 32 y.o. old female who presents to walk-in for sore throat. Pt states sore throat began 4 days ago. States they have nasal congestion, post nasal drip, productive cough, and left ear pain. Denies any fever, chills, nausea, vomiting, abdominal pain, CP, SOB, cough, or lethargy. Exposed To: Streptococcus: no.                             Infectious Mononucleosis: no.      COVID-19: no.    Review of Systems   Unless otherwise stated in this report or unable to obtain because of the patient's clinical or mental status as evidenced by the medical record, this patients's positive and negative responses for Review of Systems, constitutional, psych, eyes, ENT, cardiovascular, respiratory, gastrointestinal, neurological, genitourinary, musculoskeletal, integument systems and systems related to the presenting problem are either stated in the preceding or were not pertinent or were negative for the symptoms and/or complaints related to the medical problem. Past Medical History:  has a past medical history of Diet controlled gestational diabetes mellitus (GDM), antepartum, Hypertension, and PCOS (polycystic ovarian syndrome). Past Surgical History:  has a past surgical history that includes Appendectomy () and  section (N/A, 2021). Social History:  reports that she has never smoked. She has never used smokeless tobacco. She reports previous alcohol use. She reports that she does not use drugs. Family History: family history is not on file. Allergies: Patient has no known allergies.     Physical Exam   Vital Signs:  /76 (Site: Right Upper Arm, Position: Sitting, Cuff Size: Large Adult)   Pulse 86   Temp 97.3 °F (36.3 °C) (Temporal)   Resp 20   Ht 5' 7\" (1.702 m)   Wt (!) 352 lb (159.7 kg)   SpO2 96%   BMI 55.13 kg/m²    Oxygen Saturation Interpretation: Normal.    Constitutional:  Alert, development consistent with age. Ears:  TMs without perforation, injection, or bulging. External canals clear without exudate. Throat: Airway patent. Posterior pharynx with erythema and no tonsillar hypertrophy. There is no exudate noted. Neck:  Supple with good ROM. There is no anterior bilateral adenopathy. Lungs:  Clear to auscultation and breath sounds equal.    CV: Regular rate and rhythm, normal heart sounds, without pathological murmurs, ectopy, gallops, or rubs. Abdomen:  Soft, nontender, good bowel sounds. No firm or pulsatile mass. No hepatosplenomegaly. Skin:  No rashes, erythema present. Lymphatics: No lymphangitis or adenopathy noted other then stated above. Neurological:  Alert and orientated. Motor functions intact. Responds to commands. Test Results Section   (All laboratory and radiology results have been personally reviewed by myself)  Labs:  No results found for this visit on 02/01/22. Imaging: All Radiology results interpreted by Radiologist unless otherwise noted. No results found. Assessment / Plan   Impression(s):  Wojciech was seen today for pharyngitis and otalgia. Diagnoses and all orders for this visit:    URI with cough and congestion  -     cefdinir (OMNICEF) 300 MG capsule; Take 1 capsule by mouth 2 times daily for 10 days    Script written for Cefdinir, side effects discussed. Increase fluids and rest. NSAIDs prn pain/fever. F/u PCP in 5-7 days if symptoms persist. ED sooner if symptoms worsen or change. ED immediately with the development of refractory fever, shaking chills, dyspnea, dysphagia, neck stiffness, vomiting, etc. Pt is in agreement with this care plan. All questions answered. Return if symptoms worsen or fail to improve. Electronically signed by HIGINIO Tapia CNP   DD: 2/1/22    **This report was transcribed using voice recognition software.  Every effort was made to ensure accuracy; however, inadvertent computerized transcription errors may be present.

## 2022-02-08 ENCOUNTER — OFFICE VISIT (OUTPATIENT)
Dept: FAMILY MEDICINE CLINIC | Age: 32
End: 2022-02-08
Payer: COMMERCIAL

## 2022-02-08 VITALS
TEMPERATURE: 98.6 F | HEIGHT: 67 IN | BODY MASS INDEX: 45.99 KG/M2 | OXYGEN SATURATION: 97 % | SYSTOLIC BLOOD PRESSURE: 126 MMHG | WEIGHT: 293 LBS | HEART RATE: 94 BPM | DIASTOLIC BLOOD PRESSURE: 84 MMHG | RESPIRATION RATE: 18 BRPM

## 2022-02-08 DIAGNOSIS — J04.0 ACUTE LARYNGITIS: ICD-10-CM

## 2022-02-08 DIAGNOSIS — R09.81 NASAL CONGESTION: ICD-10-CM

## 2022-02-08 DIAGNOSIS — J01.90 ACUTE NON-RECURRENT SINUSITIS, UNSPECIFIED LOCATION: Primary | ICD-10-CM

## 2022-02-08 PROCEDURE — 99203 OFFICE O/P NEW LOW 30 MIN: CPT | Performed by: PHYSICIAN ASSISTANT

## 2022-02-08 PROCEDURE — 96372 THER/PROPH/DIAG INJ SC/IM: CPT | Performed by: PHYSICIAN ASSISTANT

## 2022-02-08 RX ORDER — METHYLPREDNISOLONE ACETATE 80 MG/ML
60 INJECTION, SUSPENSION INTRA-ARTICULAR; INTRALESIONAL; INTRAMUSCULAR; SOFT TISSUE ONCE
Status: COMPLETED | OUTPATIENT
Start: 2022-02-08 | End: 2022-02-08

## 2022-02-08 RX ORDER — AMOXICILLIN AND CLAVULANATE POTASSIUM 875; 125 MG/1; MG/1
1 TABLET, FILM COATED ORAL 2 TIMES DAILY
Qty: 20 TABLET | Refills: 0 | Status: SHIPPED | OUTPATIENT
Start: 2022-02-08 | End: 2022-02-18

## 2022-02-08 RX ADMIN — METHYLPREDNISOLONE ACETATE 60 MG: 80 INJECTION, SUSPENSION INTRA-ARTICULAR; INTRALESIONAL; INTRAMUSCULAR; SOFT TISSUE at 12:08

## 2022-02-08 NOTE — PROGRESS NOTES
22  Caleb Arredondo : 1990 Sex: female  Age 32 y.o. Subjective:  Chief Complaint   Patient presents with    Cough     [facility] was put on cefdinir. . has three days left of antibiotic not feeling any better     Drainage         HPI:   Caleb Arredondo , 32 y.o. female presents to [facility] for evaluation of sinus congestion, cough    HPI  49-year-old female presents to [facility] for evaluation of sinus congestion, drainage, cough. The patient has had the symptoms ongoing for the better part of the last week to week and a half. The patient was seen and evaluated last week. The patient was placed on cefdinir. The patient had developed a Covid back in December. The patient had also developed an ulcer in mid January. The patient states that she still congested, the patient is having somewhat of a coarseness noted. The patient is still on the antibiotic. She has been using Zyrtec and Flonase at home. ROS:   Unless otherwise stated in this report the patient's positive and negative responses for review of systems for constitutional, eyes, ENT, cardiovascular, respiratory, gastrointestinal, neurological, , musculoskeletal, and integument systems and related systems to the presenting problem are either stated in the history of present illness or were not pertinent or were negative for the symptoms and/or complaints related to the presenting medical problem. Positives and pertinent negatives as per HPI. All others reviewed and are negative.       PMH:     Past Medical History:   Diagnosis Date    Diet controlled gestational diabetes mellitus (GDM), antepartum 2021    Hypertension     PCOS (polycystic ovarian syndrome)        Past Surgical History:   Procedure Laterality Date    APPENDECTOMY  2013     SECTION N/A 2021     SECTION performed by Al Galaviz MD at [facility] L&D OR       History The patient does have some mild hoarseness noted  Neck: No anterior/posterior lymphadenopathy noted. No erythema, no masses, no fluctuance or induration noted. No meningeal signs. Cardiovascular: Regular Rate and Rhythm  Respiratory: No acute distress, no rhonchi, wheezing or crackles noted. No stridor or retractions are noted. Neurological: A&O x4, normal speech  Psychiatric: Cooperative         Testing:           Medical Decision Making:     Vital signs reviewed    Past medical history reviewed. Allergies reviewed. Medications reviewed. Patient on arrival does not appear to be in any apparent distress or discomfort. The patient has been seen and evaluated. The patient does not appear to be toxic or lethargic. The patient will be treated with Augmentin. The patient will discontinue the WEINBERG CECILE. We will treat the patient with IM injection of methylprednisone. The patient was educated on the proper dosage of motrin and tylenol and the appropriate intervals of each. The patient is to increase fluid intake over the next several days. The patient is to use OTC decongestant as needed. The patient is to return to express care or go directly to the emergency department should any of the signs or symptoms worsen. The patient is to followup with primary care physician in 2-3 days for repeat evaluation. The patient has no other questions or concerns at this time the patient will be discharged home. Clinical Impression:   Janine Olson was seen today for cough and drainage. Diagnoses and all orders for this visit:    Acute non-recurrent sinusitis, unspecified location    Acute laryngitis    Nasal congestion    Other orders  -     methylPREDNISolone acetate (DEPO-MEDROL) injection 60 mg  -     amoxicillin-clavulanate (AUGMENTIN) 875-125 MG per tablet; Take 1 tablet by mouth 2 times daily for 10 days        The patient is to call for any concerns or return if any of the signs or symptoms worsen.  The patient is to follow-up with PCP in the next 2-3 days for repeat evaluation repeat assessment or go directly to the emergency department.      SIGNATURE: Tim Montilla III, PA-C

## 2022-02-11 RX ORDER — METHYLPREDNISOLONE 4 MG/1
TABLET ORAL
Qty: 1 KIT | Refills: 0 | Status: SHIPPED
Start: 2022-02-11 | End: 2022-07-06

## 2022-06-08 ENCOUNTER — OFFICE VISIT (OUTPATIENT)
Dept: OBGYN | Age: 32
End: 2022-06-08
Payer: COMMERCIAL

## 2022-06-08 VITALS
SYSTOLIC BLOOD PRESSURE: 119 MMHG | WEIGHT: 293 LBS | BODY MASS INDEX: 45.99 KG/M2 | HEART RATE: 81 BPM | DIASTOLIC BLOOD PRESSURE: 76 MMHG | HEIGHT: 67 IN

## 2022-06-08 DIAGNOSIS — Z12.4 SCREENING FOR CERVICAL CANCER: ICD-10-CM

## 2022-06-08 DIAGNOSIS — N94.6 DYSMENORRHEA: Primary | ICD-10-CM

## 2022-06-08 DIAGNOSIS — N93.9 ABNORMAL UTERINE BLEEDING (AUB): ICD-10-CM

## 2022-06-08 DIAGNOSIS — E28.2 PCOS (POLYCYSTIC OVARIAN SYNDROME): ICD-10-CM

## 2022-06-08 PROCEDURE — 99213 OFFICE O/P EST LOW 20 MIN: CPT | Performed by: OBSTETRICS & GYNECOLOGY

## 2022-06-08 PROCEDURE — 99203 OFFICE O/P NEW LOW 30 MIN: CPT | Performed by: OBSTETRICS & GYNECOLOGY

## 2022-06-08 RX ORDER — DROSPIRENONE 4 MG/1
TABLET, FILM COATED ORAL
COMMUNITY
Start: 2022-05-06

## 2022-06-08 NOTE — PROGRESS NOTES
HISTORY OF PRESENT ILLNESS:    28 y.o. female   presents with complaint of breakthrough bleeding, painful periods, and low sex drive on current contraception. Pt also has pain with sex. Pt is taking Slynd. Pt has history of PCOS. Past Medical History:   Past Medical History:   Diagnosis Date    Diet controlled gestational diabetes mellitus (GDM), antepartum 2021    Hypertension     PCOS (polycystic ovarian syndrome)                                              OB History    Para Term  AB Living   1 1 1     1   SAB IAB Ectopic Molar Multiple Live Births           0 1      # Outcome Date GA Lbr Guillaume/2nd Weight Sex Delivery Anes PTL Lv   1 Term 21 [id number]  6 lb 3 oz (2.807 kg) F CS-LTranv EPI N RODGER      Complications: Failure to Progress in First Stage         Past Surgical History:   Past Surgical History:   Procedure Laterality Date    APPENDECTOMY  2013     SECTION N/A 2021     SECTION performed by Jeimy Zelaya MD at [facility] L[facility]        Allergies: Patient has no known allergies. Medications:   Current Outpatient Medications   Medication Sig Dispense Refill    SLYND 4 MG TABS TAKE ONE TABLET BY MOUTH EVERY DAY FOR 3 MONTHS      carvedilol (COREG) 25 MG tablet TAKE 1 TABLET BY MOUTH TWO TIMES A DAY      losartan (COZAAR) 100 MG tablet Take 100 mg by mouth daily      hydroCHLOROthiazide (HYDRODIURIL) 25 MG tablet Take 25 mg by mouth daily      methylPREDNISolone (MEDROL DOSEPACK) 4 MG tablet Take by mouth. (Patient not taking: Reported on 2022) 1 kit 0    famotidine (PEPCID) 40 MG tablet  (Patient not taking: Reported on 2022)      omeprazole (PRILOSEC) 40 MG delayed release capsule  (Patient not taking: Reported on 2022)       No current facility-administered medications for this visit.          Social History:   Social History     Tobacco Use    Smoking status: Never Smoker    Smokeless tobacco: Never Used   Substance Use Topics    Alcohol use: Not Currently        Family History:   Family History   Problem Relation Age of Onset    Ovarian Cancer Maternal Grandmother 54       REVIEW OF SYSTEMS:    Constitutional: negative  HEENT: negative  Breast: negative  Respiratory: negative  Cardiovascular: negative  Gastrointestinal: negative  Genitourinary: As per HPI  Integument: negative  Neurological: negative  Endocrine: negative          PHYSICAL EXAM  /76 (Site: Left Upper Arm, Position: Sitting)   Pulse 81   Ht 5' 7\" (1.702 m)   Wt (!) 342 lb (155.1 kg)   LMP 05/16/2022 (Approximate)   BMI 53.56 kg/m²   Patient's last menstrual period was 05/16/2022 (approximate). General appearance: alert, cooperative and in no acute distress. Head: NCAT   Abdomen: soft, non-tender; bowel sounds normal; no masses,  no organomegaly  Psych: No acute distress, mood and affect full range  Neuro: Alert and oriented, no focal deficits   Breasts: nontender, no masses palpable bilaterally, no dimpling, no discharge    Pelvic Exam:   EXTERNAL GENITALIA: normal external genitalia, no external lesions  VAGINA: normal rugae, no discharge   CERVIX: normal appearing, no lesions, no bleeding  UTERUS: uterus is normal size, shape, consistency and nontender   ADNEXA: normal adnexa in size, nontender and no masses. ASSESSMENT :      Diagnosis Orders   1. Dysmenorrhea     2. Abnormal uterine bleeding (AUB)     3. PCOS (polycystic ovarian syndrome)  LIPID PANEL   4. Screening for cervical cancer  PAP SMEAR        PLAN:    Return for IUD insertion. No orders of the defined types were placed in this encounter. Orders Placed This Encounter   Procedures    LIPID PANEL     Standing Status:   Future     Standing Expiration Date:   6/8/2023     Order Specific Question:   Is Patient Fasting?/# of Hours     Answer:   8 hours    PAP SMEAR     Order Specific Question:   Collection Type     Answer:    Thin Prep     Order Specific Question: Prior Abnormal Pap Test     Answer:   No     Order Specific Question:   Screening or Diagnostic     Answer:   Screening     Order Specific Question:   Additional STD Testing     Answer:   N/A     Order Specific Question:   HPV Requested?      Answer:   HPV Co-Test     Order Specific Question:   High Risk Patient     Answer:   N/A         Electronically signed by Umang Christina DO on 6/8/22

## 2022-06-14 LAB
HPV SAMPLE: NORMAL
HPV TYPE 16: NOT DETECTED
HPV TYPE 18: NOT DETECTED
HPV, HIGH RISK OTHER: NOT DETECTED
INTERPRETATION: NORMAL
SOURCE: NORMAL

## 2022-07-01 ENCOUNTER — TELEPHONE (OUTPATIENT)
Dept: OBGYN | Age: 32
End: 2022-07-01

## 2022-07-01 NOTE — TELEPHONE ENCOUNTER
Patient called in today to notify the office and provider that because of the deductible, her insurance will not cover the Mirena. Patient wanted a refill on Slynd until she speaks with Dr. Carlos Enrique Leblanc. Refill ordered by Benja Chan on 7/1/22. Patient states she would like to discuss other possible OCP's because she is not that happy with Slynd.

## 2022-07-06 ENCOUNTER — OFFICE VISIT (OUTPATIENT)
Dept: FAMILY MEDICINE CLINIC | Age: 32
End: 2022-07-06
Payer: COMMERCIAL

## 2022-07-06 ENCOUNTER — TELEPHONE (OUTPATIENT)
Dept: OBGYN | Age: 32
End: 2022-07-06

## 2022-07-06 VITALS
OXYGEN SATURATION: 98 % | WEIGHT: 293 LBS | DIASTOLIC BLOOD PRESSURE: 70 MMHG | TEMPERATURE: 97.3 F | BODY MASS INDEX: 53.56 KG/M2 | SYSTOLIC BLOOD PRESSURE: 116 MMHG | HEART RATE: 90 BPM

## 2022-07-06 DIAGNOSIS — Z13.6 ENCOUNTER FOR LIPID SCREENING FOR CARDIOVASCULAR DISEASE: Primary | ICD-10-CM

## 2022-07-06 DIAGNOSIS — R53.83 FATIGUE, UNSPECIFIED TYPE: ICD-10-CM

## 2022-07-06 DIAGNOSIS — E28.2 PCOS (POLYCYSTIC OVARIAN SYNDROME): Primary | ICD-10-CM

## 2022-07-06 DIAGNOSIS — R73.9 HYPERGLYCEMIA: ICD-10-CM

## 2022-07-06 DIAGNOSIS — Z13.220 ENCOUNTER FOR LIPID SCREENING FOR CARDIOVASCULAR DISEASE: Primary | ICD-10-CM

## 2022-07-06 DIAGNOSIS — I10 ESSENTIAL HYPERTENSION: ICD-10-CM

## 2022-07-06 PROBLEM — Z3A.35 35 WEEKS GESTATION OF PREGNANCY: Status: RESOLVED | Noted: 2021-01-22 | Resolved: 2022-07-06

## 2022-07-06 PROBLEM — Z3A.28 28 WEEKS GESTATION OF PREGNANCY: Status: RESOLVED | Noted: 2020-12-04 | Resolved: 2022-07-06

## 2022-07-06 PROBLEM — Z3A.23 23 WEEKS GESTATION OF PREGNANCY: Status: RESOLVED | Noted: 2020-10-30 | Resolved: 2022-07-06

## 2022-07-06 PROBLEM — Z3A.36 36 WEEKS GESTATION OF PREGNANCY: Status: RESOLVED | Noted: 2021-01-29 | Resolved: 2022-07-06

## 2022-07-06 PROBLEM — Z3A.32 32 WEEKS GESTATION OF PREGNANCY: Status: RESOLVED | Noted: 2020-12-31 | Resolved: 2022-07-06

## 2022-07-06 PROBLEM — Z3A.37 37 WEEKS GESTATION OF PREGNANCY: Status: RESOLVED | Noted: 2021-02-05 | Resolved: 2022-07-06

## 2022-07-06 PROBLEM — Z34.90 ENCOUNTER FOR INDUCTION OF LABOR: Status: RESOLVED | Noted: 2021-02-05 | Resolved: 2022-07-06

## 2022-07-06 PROBLEM — O24.410 DIET CONTROLLED GESTATIONAL DIABETES MELLITUS (GDM), ANTEPARTUM: Status: RESOLVED | Noted: 2021-01-22 | Resolved: 2022-07-06

## 2022-07-06 PROBLEM — Z34.03 PRIMIPARITY, THIRD TRIMESTER: Status: RESOLVED | Noted: 2021-01-14 | Resolved: 2022-07-06

## 2022-07-06 PROBLEM — Z3A.36 PREGNANCY WITH 36 COMPLETED WEEKS GESTATION: Status: RESOLVED | Noted: 2021-01-27 | Resolved: 2022-07-06

## 2022-07-06 PROCEDURE — 99214 OFFICE O/P EST MOD 30 MIN: CPT | Performed by: STUDENT IN AN ORGANIZED HEALTH CARE EDUCATION/TRAINING PROGRAM

## 2022-07-06 RX ORDER — ACETAMINOPHEN AND CODEINE PHOSPHATE 120; 12 MG/5ML; MG/5ML
1 SOLUTION ORAL DAILY
Qty: 30 TABLET | Refills: 3 | Status: SHIPPED
Start: 2022-07-06 | End: 2022-07-06

## 2022-07-06 SDOH — ECONOMIC STABILITY: FOOD INSECURITY: WITHIN THE PAST 12 MONTHS, THE FOOD YOU BOUGHT JUST DIDN'T LAST AND YOU DIDN'T HAVE MONEY TO GET MORE.: NEVER TRUE

## 2022-07-06 SDOH — ECONOMIC STABILITY: TRANSPORTATION INSECURITY
IN THE PAST 12 MONTHS, HAS THE LACK OF TRANSPORTATION KEPT YOU FROM MEDICAL APPOINTMENTS OR FROM GETTING MEDICATIONS?: NO

## 2022-07-06 SDOH — ECONOMIC STABILITY: FOOD INSECURITY: WITHIN THE PAST 12 MONTHS, YOU WORRIED THAT YOUR FOOD WOULD RUN OUT BEFORE YOU GOT MONEY TO BUY MORE.: NEVER TRUE

## 2022-07-06 SDOH — ECONOMIC STABILITY: TRANSPORTATION INSECURITY
IN THE PAST 12 MONTHS, HAS LACK OF TRANSPORTATION KEPT YOU FROM MEETINGS, WORK, OR FROM GETTING THINGS NEEDED FOR DAILY LIVING?: NO

## 2022-07-06 ASSESSMENT — PATIENT HEALTH QUESTIONNAIRE - PHQ9
SUM OF ALL RESPONSES TO PHQ QUESTIONS 1-9: 0
SUM OF ALL RESPONSES TO PHQ QUESTIONS 1-9: 0
1. LITTLE INTEREST OR PLEASURE IN DOING THINGS: 0
2. FEELING DOWN, DEPRESSED OR HOPELESS: 0
SUM OF ALL RESPONSES TO PHQ QUESTIONS 1-9: 0
SUM OF ALL RESPONSES TO PHQ9 QUESTIONS 1 & 2: 0
SUM OF ALL RESPONSES TO PHQ QUESTIONS 1-9: 0

## 2022-07-06 ASSESSMENT — SOCIAL DETERMINANTS OF HEALTH (SDOH): HOW HARD IS IT FOR YOU TO PAY FOR THE VERY BASICS LIKE FOOD, HOUSING, MEDICAL CARE, AND HEATING?: NOT HARD AT ALL

## 2022-07-06 NOTE — PROGRESS NOTES
MIGUEL ÁNGEL MOR[facility]  Office Note  Dr. Gertrude Rajput      Patient:  Yani Hutchinson Masters 28 y.o. female     Date of Service: 22      Chief complaint:   Chief Complaint   Patient presents with   [address] [facility] [address] Hypertension     feels fatigued, thinks bp meds are causing         History of Present Illness   The patient is a 28 y.o. female  presented to the clinic with complaints as above. HTN  Takes 3 BP meds. Overall well controlled. Does have some fatigue but no light headedness or dizziness. Denies chest pain, shortness of breath, leg swelling, headache, vision changes and orthopnea  BP Readings from Last 3 Encounters:   22 116/70   22 119/76   22 126/84     Fatigue  -was tested for sleep apnea. Was told she had very mild if any sleep apnea. She does feel like her weight may be an issue. Is conscious of her diet. Has a very busy lifestyle and not exercising a lot currently. She did have some gestational diabetes when pregnant controlled with diet    She follows with gynecology and     Past Medical History:      Diagnosis Date    Diet controlled gestational diabetes mellitus (GDM), antepartum 2021    Diet controlled gestational diabetes mellitus (GDM), antepartum 2021    Elevated blood pressure affecting pregnancy in third trimester, antepartum     Encounter for induction of labor 2021    Hypertension     PCOS (polycystic ovarian syndrome)     Pregnancy with 36 completed weeks gestation 2021    Primiparity, third trimester 2021       PastSurgical History:        Procedure Laterality Date    APPENDECTOMY  2013     SECTION N/A 2021     SECTION performed by Tobi Sy MD at [facility] L&D OR       Allergies:    Patient has no known allergies.     Social History:   Social History     Socioeconomic History    Marital status:      Spouse name: Not on file    Number of children: Not on file    Years of education: Not on file    Highest education level: Not on file   Occupational History    Not on file   Tobacco Use    Smoking status: Never Smoker    Smokeless tobacco: Never Used   Vaping Use    Vaping Use: Never used   Substance and Sexual Activity    Alcohol use: Not Currently    Drug use: Never    Sexual activity: Yes     Partners: Male   Other Topics Concern    Not on file   Social History Narrative    Not on file     Social Determinants of Health     Financial Resource Strain: Low Risk     Difficulty of Paying Living Expenses: Not hard at all   Food Insecurity: No Food Insecurity    Worried About [address] [facility] in the Last Year: Never true    Riki of Food in the Last Year: Never true   Transportation Needs: No Transportation Needs    Lack of Transportation (Medical): No    Lack of Transportation (Non-Medical): No   Physical Activity:     Days of Exercise per Week: Not on file    Minutes of Exercise per Session: Not on file   Stress:     Feeling of Stress : Not on file   Social Connections:     Frequency of Communication with Friends and Family: Not on file    Frequency of Social Gatherings with Friends and Family: Not on file    Attends Hinduism Services: Not on file    Active Member of [address] [place] [address] or Organizations: Not on file    Attends Club or Organization Meetings: Not on file    Marital Status: Not on file   Intimate Partner Violence:     Fear of Current or Ex-Partner: Not on file    Emotionally Abused: Not on file    Physically Abused: Not on file    Sexually Abused: Not on file   Housing Stability:     Unable to Pay for Housing in the Last Year: Not on file    Number of [place] in the Last Year: Not on file    Unstable Housing in the Last Year: Not on file        Family History:       Problem Relation Age of Onset    Ovarian Cancer Maternal Grandmother 55       Review of Systems:   Review of Systems   Constitutional: Positive for fatigue. Negative for chills and fever.    HENT: Negative for congestion and rhinorrhea. Respiratory: Negative for cough and shortness of breath. Cardiovascular: Negative for chest pain and leg swelling. Gastrointestinal: Negative for abdominal pain, nausea and vomiting. Genitourinary: Negative for dysuria and hematuria. Musculoskeletal: Negative for arthralgias and myalgias. Skin: Negative for rash and wound. Neurological: Negative for dizziness and light-headedness. Physical Exam   Vitals: /70   Pulse 90   Temp 97.3 °F (36.3 °C)   Wt (!) 342 lb (155.1 kg)   SpO2 98%   BMI 53.56 kg/m²   Physical Exam    General:  Awake, alert, oriented X 3. Well developed, well nourished, well groomed. No apparent distress. HEENT:  Normocephalic, atraumatic. No scleral icterus. No conjunctival injection. No nasal discharge. Neck:  Supple  Heart:  RRR, no murmurs, gallops, or rubs  Lungs:  CTA bilaterally, bilat symmetrical expansion, no wheeze, rales, or rhonchi  Extremities:  No clubbing, cyanosis, or edema  Skin:  Warm and dry, no open lesions or rash  Neuro:  Cranial nerves 2-12 intact, no focal deficits    Assessment and Plan   Overall doing well but does have fatigue, weight issues. We will screen for thyroid disorder, anemia, hyperlipidemia and diabetes. Her hypertension is well controlled and at goal. I will not make any changes to her current regimen    1. Encounter for lipid screening for cardiovascular disease    - Lipid Panel; Future    2. Essential hypertension    - Comprehensive Metabolic Panel; Future    3. Fatigue, unspecified type    - CBC with Auto Differential; Future  - TSH; Future    4. Hyperglycemia    - Hemoglobin A1C; Future      Counseled regarding above diagnosis, including possible risks and complications,  especially if left uncontrolled.  Counseled regarding the possible side effects, risks, benefits and alternatives to treatment;patient and/or guardian verbalizes understanding, agrees, feels comfortable with and wishes to proceed with above treatment plan. Call or go to ED immediately if symptoms worsen or persist. Advised patient to call with any new medication issues, and, as applicable, read all Rx info from pharmacy to assure aware of all possible risks and side effects of medicationbefore taking. Patient and/or guardian given opportunity to ask questions/raise concerns. The patient verbalized comfort and understanding ofinstructions. Return to Office: Return in about 3 months (around 10/6/2022) for Medication Check. Medication List:    Current Outpatient Medications   Medication Sig Dispense Refill    SLYND 4 MG TABS TAKE ONE TABLET BY MOUTH EVERY DAY FOR 3 MONTHS      carvedilol (COREG) 25 MG tablet daily       losartan (COZAAR) 100 MG tablet Take 100 mg by mouth daily      hydroCHLOROthiazide (HYDRODIURIL) 25 MG tablet Take 25 mg by mouth daily       No current facility-administered medications for this visit. Dee Ng MD         This document may have been prepared at least partially through the use of voice recognition software. Although effort is taken to assure the accuracy ofthis document, it is possible that grammatical, syntax, or spelling errors may occur.

## 2022-07-06 NOTE — TELEPHONE ENCOUNTER
Discussed switching from [facility] to norethindrone due to spotting. Pt agreeable to plan, f/u 3 months.

## 2022-07-07 ASSESSMENT — ENCOUNTER SYMPTOMS
VOMITING: 0
SHORTNESS OF BREATH: 0
NAUSEA: 0
COUGH: 0
RHINORRHEA: 0
ABDOMINAL PAIN: 0

## 2022-07-19 ENCOUNTER — HOSPITAL ENCOUNTER (OUTPATIENT)
Age: 32
Discharge: HOME | End: 2022-07-19
Payer: COMMERCIAL

## 2022-07-19 DIAGNOSIS — I10 ESSENTIAL HYPERTENSION: ICD-10-CM

## 2022-07-19 DIAGNOSIS — R73.9 HYPERGLYCEMIA: ICD-10-CM

## 2022-07-19 DIAGNOSIS — Z13.6 ENCOUNTER FOR LIPID SCREENING FOR CARDIOVASCULAR DISEASE: ICD-10-CM

## 2022-07-19 DIAGNOSIS — R53.83 FATIGUE, UNSPECIFIED TYPE: ICD-10-CM

## 2022-07-19 DIAGNOSIS — Z13.220 ENCOUNTER FOR LIPID SCREENING FOR CARDIOVASCULAR DISEASE: ICD-10-CM

## 2022-07-19 LAB
ALBUMIN SERPL-MCNC: 4.6 G/DL (ref 3.5–5.2)
ALP BLD-CCNC: 68 U/L (ref 35–104)
ALT SERPL-CCNC: 22 U/L (ref 0–32)
ANION GAP SERPL CALCULATED.3IONS-SCNC: 14 MMOL/L (ref 7–16)
AST SERPL-CCNC: 21 U/L (ref 0–31)
BASOPHILS ABSOLUTE: 0.04 E9/L (ref 0–0.2)
BASOPHILS RELATIVE PERCENT: 0.4 % (ref 0–2)
BILIRUB SERPL-MCNC: 0.5 MG/DL (ref 0–1.2)
BUN BLDV-MCNC: 14 MG/DL (ref 6–20)
CALCIUM SERPL-MCNC: 9.7 MG/DL (ref 8.6–10.2)
CHLORIDE BLD-SCNC: 103 MMOL/L (ref 98–107)
CHOLESTEROL, TOTAL: 141 MG/DL (ref 0–199)
CHOLESTEROL, TOTAL: 153 MG/DL (ref 0–199)
CO2: 22 MMOL/L (ref 22–29)
CREAT SERPL-MCNC: 0.7 MG/DL (ref 0.5–1)
EOSINOPHILS ABSOLUTE: 0.22 E9/L (ref 0.05–0.5)
EOSINOPHILS RELATIVE PERCENT: 1.9 % (ref 0–6)
GFR AFRICAN AMERICAN: >60
GFR NON-AFRICAN AMERICAN: >60 ML/MIN/1.73
GLUCOSE BLD-MCNC: 108 MG/DL (ref 74–107)
GLUCOSE BLD-MCNC: 108 MG/DL (ref 74–99)
HBA1C MFR BLD: 5.5 % (ref 4–5.6)
HCT VFR BLD CALC: 42.6 % (ref 34–48)
HDLC SERPL-MCNC: 45 MG/DL
HDLC SERPL-MCNC: 46 MG/DL
HEMOGLOBIN: 14 G/DL (ref 11.5–15.5)
IMMATURE GRANULOCYTES #: 0.04 E9/L
IMMATURE GRANULOCYTES %: 0.4 % (ref 0–5)
LDL CHOLESTEROL CALCULATED: 80 MG/DL (ref 0–99)
LDL CHOLESTEROL CALCULATED: 92 MG/DL (ref 0–99)
LYMPHOCYTES ABSOLUTE: 2.34 E9/L (ref 1.5–4)
LYMPHOCYTES RELATIVE PERCENT: 20.6 % (ref 20–42)
MCH RBC QN AUTO: 27.9 PG (ref 26–35)
MCHC RBC AUTO-ENTMCNC: 32.9 % (ref 32–34.5)
MCV RBC AUTO: 84.9 FL (ref 80–99.9)
MONOCYTES ABSOLUTE: 0.69 E9/L (ref 0.1–0.95)
MONOCYTES RELATIVE PERCENT: 6.1 % (ref 2–12)
NEUTROPHILS ABSOLUTE: 8.02 E9/L (ref 1.8–7.3)
NEUTROPHILS RELATIVE PERCENT: 70.6 % (ref 43–80)
PDW BLD-RTO: 14 FL (ref 11.5–15)
PLATELET # BLD: 307 E9/L (ref 130–450)
PMV BLD AUTO: 10.3 FL (ref 7–12)
POTASSIUM SERPL-SCNC: 4 MMOL/L (ref 3.5–5)
RBC # BLD: 5.02 E12/L (ref 3.5–5.5)
SODIUM BLD-SCNC: 139 MMOL/L (ref 132–146)
TOTAL PROTEIN: 7.5 G/DL (ref 6.4–8.3)
TRIGL SERPL-MCNC: 76 MG/DL (ref 0–149)
TRIGL SERPL-MCNC: 78 MG/DL (ref 0–149)
TSH SERPL DL<=0.05 MIU/L-ACNC: 1.49 UIU/ML (ref 0.27–4.2)
VLDLC SERPL CALC-MCNC: 15 MG/DL
WBC # BLD: 11.4 E9/L (ref 4.5–11.5)

## 2022-07-19 PROCEDURE — 84443 ASSAY THYROID STIM HORMONE: CPT

## 2022-07-19 PROCEDURE — 36415 COLL VENOUS BLD VENIPUNCTURE: CPT

## 2022-07-19 PROCEDURE — 83036 HEMOGLOBIN GLYCOSYLATED A1C: CPT

## 2022-07-19 PROCEDURE — 80053 COMPREHEN METABOLIC PANEL: CPT

## 2022-07-19 PROCEDURE — 80061 LIPID PANEL: CPT

## 2022-07-19 PROCEDURE — 85025 COMPLETE CBC W/AUTO DIFF WBC: CPT

## 2022-07-20 NOTE — RESULT ENCOUNTER NOTE
Overall normal labs-slightly elevated glucose but does not have pre diabetes. No apparent reason for fatigue on lab work-No anemia and thyroid is normal. Will continue workup when I see her next.

## 2022-10-24 ENCOUNTER — OFFICE VISIT (OUTPATIENT)
Dept: FAMILY MEDICINE CLINIC | Age: 32
End: 2022-10-24
Payer: COMMERCIAL

## 2022-10-24 VITALS
DIASTOLIC BLOOD PRESSURE: 82 MMHG | HEART RATE: 121 BPM | TEMPERATURE: 101.7 F | BODY MASS INDEX: 55.63 KG/M2 | OXYGEN SATURATION: 97 % | WEIGHT: 293 LBS | SYSTOLIC BLOOD PRESSURE: 120 MMHG

## 2022-10-24 DIAGNOSIS — R05.9 COUGH, UNSPECIFIED TYPE: ICD-10-CM

## 2022-10-24 DIAGNOSIS — J10.1 INFLUENZA A: Primary | ICD-10-CM

## 2022-10-24 LAB
INFLUENZA A ANTIBODY: POSITIVE
INFLUENZA B ANTIBODY: NEGATIVE
Lab: NORMAL
PERFORMING INSTRUMENT: NORMAL
QC PASS/FAIL: NORMAL
SARS-COV-2, POC: NORMAL

## 2022-10-24 PROCEDURE — 99213 OFFICE O/P EST LOW 20 MIN: CPT | Performed by: PHYSICIAN ASSISTANT

## 2022-10-24 PROCEDURE — 87426 SARSCOV CORONAVIRUS AG IA: CPT | Performed by: PHYSICIAN ASSISTANT

## 2022-10-24 PROCEDURE — 87804 INFLUENZA ASSAY W/OPTIC: CPT | Performed by: PHYSICIAN ASSISTANT

## 2022-10-24 RX ORDER — DEXTROMETHORPHAN HYDROBROMIDE AND PROMETHAZINE HYDROCHLORIDE 15; 6.25 MG/5ML; MG/5ML
5 SYRUP ORAL 4 TIMES DAILY PRN
Qty: 120 ML | Refills: 0 | Status: SHIPPED
Start: 2022-10-24 | End: 2022-10-31 | Stop reason: SDUPTHER

## 2022-10-24 RX ORDER — METHYLPREDNISOLONE 4 MG/1
TABLET ORAL
Qty: 1 KIT | Refills: 0 | Status: SHIPPED
Start: 2022-10-24 | End: 2022-11-22

## 2022-10-24 RX ORDER — OSELTAMIVIR PHOSPHATE 75 MG/1
75 CAPSULE ORAL 2 TIMES DAILY
Qty: 10 CAPSULE | Refills: 0 | Status: SHIPPED | OUTPATIENT
Start: 2022-10-24 | End: 2022-10-29

## 2022-10-24 NOTE — PROGRESS NOTES
10/24/22  Natalee Arredondo : 1990 Sex: female  Age 28 y.o. Subjective:  Chief Complaint   Patient presents with    Headache     X4 days    Cough    Congestion    Drainage    Otalgia         HPI:   Natalee Arredondo , 28 y.o. female presents to express care for evaluation of headache, cough, congestion, ear pain, drainage. HPI  80-year-old female presents to express care for evaluation of cough, congestion, fever, headache, drainage. The patient started with the symptoms over the last for 5 days. The patient has had some increased congestion, drainage, dizziness. The patient states that she did do a home COVID test yesterday that was negative. The patient does work at the hospital.  She has been having increased congestion, drainage. The patient is having a fever. The patient has had COVID-vaccine. She has not had the booster. The patient has not received the flu vaccination yet this year. ROS:   Unless otherwise stated in this report the patient's positive and negative responses for review of systems for constitutional, eyes, ENT, cardiovascular, respiratory, gastrointestinal, neurological, , musculoskeletal, and integument systems and related systems to the presenting problem are either stated in the history of present illness or were not pertinent or were negative for the symptoms and/or complaints related to the presenting medical problem. Positives and pertinent negatives as per HPI. All others reviewed and are negative.       PMH:     Past Medical History:   Diagnosis Date    Diet controlled gestational diabetes mellitus (GDM), antepartum 2021    Diet controlled gestational diabetes mellitus (GDM), antepartum 2021    Elevated blood pressure affecting pregnancy in third trimester, antepartum     Encounter for induction of labor 2021    Hypertension     PCOS (polycystic ovarian syndrome)     Pregnancy with 36 completed weeks gestation 2021    Primiparity, third trimester 2021       Past Surgical History:   Procedure Laterality Date    APPENDECTOMY  2013     SECTION N/A 2021     SECTION performed by Magdiel Easley MD at [facility] L&D OR       Family History   Problem Relation Age of Onset    Ovarian Cancer Maternal Grandmother 54       Medications:     Current Outpatient Medications:     methylPREDNISolone (MEDROL DOSEPACK) 4 MG tablet, Take by mouth., Disp: 1 kit, Rfl: 0    oseltamivir (TAMIFLU) 75 MG capsule, Take 1 capsule by mouth 2 times daily for 5 days, Disp: 10 capsule, Rfl: 0    promethazine-dextromethorphan (PROMETHAZINE-DM) 6.25-15 MG/5ML syrup, Take 5 mLs by mouth 4 times daily as needed for Cough, Disp: 120 mL, Rfl: 0    SLYND 4 MG TABS, TAKE ONE TABLET BY MOUTH EVERY DAY FOR 3 MONTHS, Disp: , Rfl:     carvedilol (COREG) 25 MG tablet, daily , Disp: , Rfl:     losartan (COZAAR) 100 MG tablet, Take 100 mg by mouth daily, Disp: , Rfl:     hydroCHLOROthiazide (HYDRODIURIL) 25 MG tablet, Take 25 mg by mouth daily, Disp: , Rfl:     Allergies:   No Known Allergies    Social History:     Social History     Tobacco Use    Smoking status: Never    Smokeless tobacco: Never   Vaping Use    Vaping Use: Never used   Substance Use Topics    Alcohol use: Not Currently    Drug use: Never       Patient lives at home. Physical Exam:     Vitals:    10/24/22 1050   BP: 120/82   Site: Right Upper Arm   Position: Sitting   Pulse: (!) 121   Temp: (!) 101.7 °F (38.7 °C)   TempSrc: Temporal   SpO2: 97%   Weight: (!) 355 lb 3.2 oz (161.1 kg)       Exam:  Physical Exam  Nurse's notes and vital signs reviewed. The patient is not hypoxic. ? General: Alert, no acute distress, patient resting comfortably Patient is not toxic or lethargic. Skin: Warm, intact, no pallor noted. There is no evidence of rash at this time.   Head: Normocephalic, atraumatic  Eye: Normal conjunctiva  Ears, Nose, Throat: Right tympanic membrane clear, left tympanic membrane clear. No drainage or discharge noted. No pre- or post-auricular tenderness, erythema, or swelling noted. No rhinorrhea or congestion noted. Posterior oropharynx shows no erythema, tonsillar hypertrophy, or exudate. the uvula is midline. No trismus or drooling is noted. Moist mucous membranes. Neck: No anterior/posterior lymphadenopathy noted. No erythema, no masses, no fluctuance or induration noted. No meningeal signs. Cardiovascular: Regular Rate and Rhythm  Respiratory: No acute distress, no rhonchi, wheezing or crackles noted. No stridor or retractions are noted. Neurological: A&O x4, normal speech  Psychiatric: Cooperative       Testing:     Results for orders placed or performed in visit on 10/24/22   POCT Influenza A/B   Result Value Ref Range    Influenza A Ab Positive (A)     Influenza B Ab Negative            Medical Decision Making:     Vital signs reviewed    Past medical history reviewed. Allergies reviewed. Medications reviewed. Patient on arrival does not appear to be in any apparent distress or discomfort. The patient has been seen and evaluated. The patient does not appear to be toxic or lethargic. Influenza positive for influenza A. Influenza B-.    COVID-negative    The patient will be treated with Promethazine DM, Medrol Dosepak and Tamiflu. The patient was educated on the proper dosage of motrin and tylenol and the appropriate intervals of each. The patient is to increase fluid intake over the next several days. The patient is to use OTC decongestant as needed. The patient is to return to express care or go directly to the emergency department should any of the signs or symptoms worsen. The patient is to followup with primary care physician in 2-3 days for repeat evaluation. The patient has no other questions or concerns at this time the patient will be discharged home.       Clinical Impression:   Lucius Turner was seen today for headache, cough, congestion, drainage and otalgia. Diagnoses and all orders for this visit:    Influenza A    Cough, unspecified type  -     POCT COVID-19, Antigen  -     POCT Influenza A/B    Other orders  -     methylPREDNISolone (MEDROL DOSEPACK) 4 MG tablet; Take by mouth.  -     oseltamivir (TAMIFLU) 75 MG capsule; Take 1 capsule by mouth 2 times daily for 5 days  -     promethazine-dextromethorphan (PROMETHAZINE-DM) 6.25-15 MG/5ML syrup; Take 5 mLs by mouth 4 times daily as needed for Cough      The patient is to call for any concerns or return if any of the signs or symptoms worsen. The patient is to follow-up with PCP in the next 2-3 days for repeat evaluation repeat assessment or go directly to the emergency department.      SIGNATURE: Marla Paredes III, PA-C

## 2022-11-01 RX ORDER — DEXTROMETHORPHAN HYDROBROMIDE AND PROMETHAZINE HYDROCHLORIDE 15; 6.25 MG/5ML; MG/5ML
5 SYRUP ORAL 4 TIMES DAILY PRN
Qty: 120 ML | Refills: 0 | Status: SHIPPED | OUTPATIENT
Start: 2022-11-01 | End: 2022-11-08

## 2022-11-22 ENCOUNTER — OFFICE VISIT (OUTPATIENT)
Dept: FAMILY MEDICINE CLINIC | Age: 32
End: 2022-11-22
Payer: COMMERCIAL

## 2022-11-22 VITALS
HEART RATE: 102 BPM | BODY MASS INDEX: 57.32 KG/M2 | DIASTOLIC BLOOD PRESSURE: 78 MMHG | WEIGHT: 293 LBS | TEMPERATURE: 97.3 F | SYSTOLIC BLOOD PRESSURE: 128 MMHG | OXYGEN SATURATION: 98 %

## 2022-11-22 DIAGNOSIS — J01.90 ACUTE BACTERIAL SINUSITIS: ICD-10-CM

## 2022-11-22 DIAGNOSIS — I10 ESSENTIAL HYPERTENSION: Primary | ICD-10-CM

## 2022-11-22 DIAGNOSIS — E66.01 CLASS 3 SEVERE OBESITY DUE TO EXCESS CALORIES WITH SERIOUS COMORBIDITY AND BODY MASS INDEX (BMI) OF 50.0 TO 59.9 IN ADULT (HCC): ICD-10-CM

## 2022-11-22 DIAGNOSIS — B96.89 ACUTE BACTERIAL SINUSITIS: ICD-10-CM

## 2022-11-22 PROCEDURE — 3074F SYST BP LT 130 MM HG: CPT | Performed by: STUDENT IN AN ORGANIZED HEALTH CARE EDUCATION/TRAINING PROGRAM

## 2022-11-22 PROCEDURE — 3078F DIAST BP <80 MM HG: CPT | Performed by: STUDENT IN AN ORGANIZED HEALTH CARE EDUCATION/TRAINING PROGRAM

## 2022-11-22 PROCEDURE — 99214 OFFICE O/P EST MOD 30 MIN: CPT | Performed by: STUDENT IN AN ORGANIZED HEALTH CARE EDUCATION/TRAINING PROGRAM

## 2022-11-22 RX ORDER — CARVEDILOL 25 MG/1
25 TABLET ORAL DAILY
Qty: 90 TABLET | Refills: 1 | Status: SHIPPED | OUTPATIENT
Start: 2022-11-22

## 2022-11-22 RX ORDER — LOSARTAN POTASSIUM 100 MG/1
100 TABLET ORAL DAILY
Qty: 90 TABLET | Refills: 1 | Status: SHIPPED | OUTPATIENT
Start: 2022-11-22

## 2022-11-22 RX ORDER — AMOXICILLIN 500 MG/1
500 CAPSULE ORAL 2 TIMES DAILY
Qty: 20 CAPSULE | Refills: 0 | Status: SHIPPED | OUTPATIENT
Start: 2022-11-22 | End: 2022-12-02

## 2022-11-22 RX ORDER — HYDROCHLOROTHIAZIDE 25 MG/1
25 TABLET ORAL DAILY
Qty: 90 TABLET | Refills: 1 | Status: SHIPPED | OUTPATIENT
Start: 2022-11-22

## 2022-11-22 ASSESSMENT — ENCOUNTER SYMPTOMS
COUGH: 1
NAUSEA: 0
VOMITING: 0
ABDOMINAL PAIN: 0
SHORTNESS OF BREATH: 0
RHINORRHEA: 1

## 2022-11-22 NOTE — PROGRESS NOTES
[facility]  Office Progress Note  Dr. Rodney Head      Patient:  Nicole Manual Masters 28 y.o. female     Date of Service: 11/22/22      Chief complaint:   Chief Complaint   Patient presents with    Hypertension    Sinus Problem         History of Present Illness   The patient is a 28 y.o. female  here to follow up of their HTN, obesity, sinus issues    HTN  -well controlled on current regimen  Denies chest pain, shortness of breath, leg swelling, headache, vision changes and orthopnea  BP Readings from Last 3 Encounters:   11/22/22 128/78   10/24/22 120/82   07/06/22 116/70       Recent infection with flu A-about a month ago, overall feeling improved but sinus congestion, drainage pressure persist. Has been trying OTC antihistamines without much success. Reports things aren't going well with weight  In the past she has tried cutting carbs,  exercising daily-had success with this. Has never tried medications. Believes she has some insulin sensitivity issues. Reports issues with binge eating occasionally. Feels like she needs sweets frequently after meals  Wt Readings from Last 3 Encounters:   11/22/22 (!) 366 lb (166 kg)   10/24/22 (!) 355 lb 3.2 oz (161.1 kg)   07/06/22 (!) 342 lb (155.1 kg)         Past Medical History:      Diagnosis Date    Diet controlled gestational diabetes mellitus (GDM), antepartum 1/22/2021    Diet controlled gestational diabetes mellitus (GDM), antepartum 1/22/2021    Elevated blood pressure affecting pregnancy in third trimester, antepartum     Encounter for induction of labor 2/5/2021    Hypertension     PCOS (polycystic ovarian syndrome)     Pregnancy with 36 completed weeks gestation 1/27/2021    Primiparity, third trimester 1/14/2021       Review of Systems:   Review of Systems   Constitutional:  Negative for chills and fever. HENT:  Positive for congestion, postnasal drip and rhinorrhea. Respiratory:  Positive for cough. Negative for shortness of breath. Cardiovascular:  Negative for chest pain and leg swelling. Gastrointestinal:  Negative for abdominal pain, nausea and vomiting. Genitourinary:  Negative for dysuria and hematuria. Musculoskeletal:  Negative for arthralgias and myalgias. Skin:  Negative for rash and wound. Neurological:  Negative for dizziness and light-headedness. Physical Exam   Vitals: /78   Pulse (!) 102   Temp 97.3 °F (36.3 °C)   Wt (!) 366 lb (166 kg)   SpO2 98%   BMI 57.32 kg/m²   Physical Exam    General:  well nourished, well groomed. No apparent distress. HEENT:  Normocephalic, atraumatic. No scleral icterus. No conjunctival injection. Congestion, sinus tenderness, Tmnormal bilaterally  Heart:  RRR, no murmurs, gallops, or rubs  Lungs:  CTA bilaterally, bilat symmetrical expansion, no wheeze, rales, or rhonchi  Abdomen: Bowel sounds present, soft, nontender, no masses, no organomegaly, no peritoneal signs  Extremities:  No clubbing, cyanosis, or edema  Neuro:  Alert and oriented x3, no focal deficits      Assessment and Plan   Continue current BP treatment  Suspect superimposed bacterial sinus infection-treat with amoxil  Discussed weight loss strategies to improve insulin sensitivity-increase fiber and healthy fats, avoid eating between meals, finding long term reasonable changes. Will follow pu in 2 months to discuss medical options    1. Essential hypertension    - carvedilol (COREG) 25 MG tablet; Take 1 tablet by mouth daily daily  Dispense: 90 tablet; Refill: 1  - hydroCHLOROthiazide (HYDRODIURIL) 25 MG tablet; Take 1 tablet by mouth daily  Dispense: 90 tablet; Refill: 1  - losartan (COZAAR) 100 MG tablet; Take 1 tablet by mouth daily  Dispense: 90 tablet; Refill: 1    2. Class 3 severe obesity due to excess calories with serious comorbidity and body mass index (BMI) of 50.0 to 59.9 in adult ([facility]      3. Acute bacterial sinusitis    - amoxicillin (AMOXIL) 500 MG capsule;  Take 1 capsule by mouth 2 times daily for 10 days  Dispense: 20 capsule; Refill: 0    Counseled regarding above diagnosis, including possible risks and complications,  especially if left uncontrolled. Counseled regarding the possible side effects, risks, benefits and alternatives to treatment;patient and/or guardian verbalizes understanding, agrees, feels comfortable with and wishes to proceed with above treatment plan. Call or go to ED immediately if symptoms worsen or persist. Advised patient to call with any new medication issues, and, as applicable, read all Rx info from pharmacy to assure aware of all possible risks and side effects of medicationbefore taking. Patient and/or guardian given opportunity to ask questions/raise concerns. The patient verbalized comfort and understanding ofinstructions. Return to Office: Return in about 2 months (around 1/22/2023) for Weight check. Medication List:    Current Outpatient Medications   Medication Sig Dispense Refill    carvedilol (COREG) 25 MG tablet Take 1 tablet by mouth daily daily 90 tablet 1    hydroCHLOROthiazide (HYDRODIURIL) 25 MG tablet Take 1 tablet by mouth daily 90 tablet 1    losartan (COZAAR) 100 MG tablet Take 1 tablet by mouth daily 90 tablet 1    amoxicillin (AMOXIL) 500 MG capsule Take 1 capsule by mouth 2 times daily for 10 days 20 capsule 0    SLYND 4 MG TABS TAKE ONE TABLET BY MOUTH EVERY DAY FOR 3 MONTHS       No current facility-administered medications for this visit. Edwin Esquivel MD     This document may have been prepared at least partially through the use of voice recognition software. Although effort is taken to assure the accuracy ofthis document, it is possible that grammatical, syntax, or spelling errors may occur.

## 2022-11-29 ENCOUNTER — OFFICE VISIT (OUTPATIENT)
Dept: FAMILY MEDICINE CLINIC | Age: 32
End: 2022-11-29
Payer: COMMERCIAL

## 2022-11-29 VITALS
SYSTOLIC BLOOD PRESSURE: 132 MMHG | TEMPERATURE: 97.3 F | DIASTOLIC BLOOD PRESSURE: 80 MMHG | WEIGHT: 293 LBS | BODY MASS INDEX: 45.99 KG/M2 | OXYGEN SATURATION: 99 % | HEART RATE: 103 BPM | HEIGHT: 67 IN

## 2022-11-29 DIAGNOSIS — J01.90 ACUTE NON-RECURRENT SINUSITIS, UNSPECIFIED LOCATION: ICD-10-CM

## 2022-11-29 DIAGNOSIS — Z20.822 SUSPECTED COVID-19 VIRUS INFECTION: Primary | ICD-10-CM

## 2022-11-29 DIAGNOSIS — J06.9 ACUTE UPPER RESPIRATORY INFECTION, UNSPECIFIED: ICD-10-CM

## 2022-11-29 LAB
Lab: NORMAL
QC PASS/FAIL: NORMAL
SARS-COV-2 RDRP RESP QL NAA+PROBE: NEGATIVE

## 2022-11-29 PROCEDURE — 3078F DIAST BP <80 MM HG: CPT | Performed by: PHYSICIAN ASSISTANT

## 2022-11-29 PROCEDURE — 3074F SYST BP LT 130 MM HG: CPT | Performed by: PHYSICIAN ASSISTANT

## 2022-11-29 PROCEDURE — 99214 OFFICE O/P EST MOD 30 MIN: CPT | Performed by: PHYSICIAN ASSISTANT

## 2022-11-29 PROCEDURE — 87635 SARS-COV-2 COVID-19 AMP PRB: CPT | Performed by: PHYSICIAN ASSISTANT

## 2022-11-29 PROCEDURE — 3006F CXR DOC REV: CPT | Performed by: PHYSICIAN ASSISTANT

## 2022-11-29 RX ORDER — ALBUTEROL SULFATE 90 UG/1
2 AEROSOL, METERED RESPIRATORY (INHALATION) 4 TIMES DAILY PRN
Qty: 18 G | Refills: 0 | Status: SHIPPED | OUTPATIENT
Start: 2022-11-29

## 2022-11-29 RX ORDER — DOXYCYCLINE HYCLATE 100 MG
100 TABLET ORAL 2 TIMES DAILY
Qty: 20 TABLET | Refills: 0 | Status: SHIPPED | OUTPATIENT
Start: 2022-11-29 | End: 2022-12-09

## 2022-11-29 RX ORDER — DEXTROMETHORPHAN HYDROBROMIDE AND PROMETHAZINE HYDROCHLORIDE 15; 6.25 MG/5ML; MG/5ML
5 SYRUP ORAL 4 TIMES DAILY PRN
Qty: 473 ML | Refills: 1 | Status: SHIPPED | OUTPATIENT
Start: 2022-11-29 | End: 2022-12-06

## 2022-11-29 RX ORDER — PREDNISONE 10 MG/1
TABLET ORAL
Qty: 18 TABLET | Refills: 0 | Status: SHIPPED | OUTPATIENT
Start: 2022-11-29

## 2022-11-29 NOTE — PROGRESS NOTES
22  Aileen Arredondo : 1990 Sex: female  Age 28 y.o. Subjective:  Chief Complaint   Patient presents with    Pharyngitis     Started around 10/31. Had influenza and hasn't gotten better. Hoarse    Cough    Chest Congestion         HPI:   Aileen Arredondo , 28 y.o. female presents to express care for evaluation of cough, congestion, sore throat, hoarseness    HPI  40-year-old female presents to express care for evaluation of cough, congestion, hoarseness and sore throat. The patient was noted to have influenza a at the end of October. The patient states that she really has not improved since that time. The patient recently was seen and evaluated by PCP and diagnosed with a sinus infection. The patient was treated with amoxicillin. Does not seem to be improving. Worsening in the last 2 to 3 days. Having cough, congestion      ROS:   Unless otherwise stated in this report the patient's positive and negative responses for review of systems for constitutional, eyes, ENT, cardiovascular, respiratory, gastrointestinal, neurological, , musculoskeletal, and integument systems and related systems to the presenting problem are either stated in the history of present illness or were not pertinent or were negative for the symptoms and/or complaints related to the presenting medical problem. Positives and pertinent negatives as per HPI. All others reviewed and are negative.       PMH:     Past Medical History:   Diagnosis Date    Diet controlled gestational diabetes mellitus (GDM), antepartum 2021    Diet controlled gestational diabetes mellitus (GDM), antepartum 2021    Elevated blood pressure affecting pregnancy in third trimester, antepartum     Encounter for induction of labor 2021    Hypertension     PCOS (polycystic ovarian syndrome)     Pregnancy with 36 completed weeks gestation 2021    Primiparity, third trimester 2021       Past Surgical History: Procedure Laterality Date    APPENDECTOMY  2013     SECTION N/A 2021     SECTION performed by Hernando Flynn MD at [facility] L&D OR       Family History   Problem Relation Age of Onset    Ovarian Cancer Maternal Grandmother 54       Medications:     Current Outpatient Medications:     doxycycline hyclate (VIBRA-TABS) 100 MG tablet, Take 1 tablet by mouth 2 times daily for 10 days, Disp: 20 tablet, Rfl: 0    albuterol sulfate HFA (PROVENTIL;VENTOLIN;PROAIR) 108 (90 Base) MCG/ACT inhaler, Inhale 2 puffs into the lungs 4 times daily as needed for Wheezing, Disp: 18 g, Rfl: 0    predniSONE (DELTASONE) 10 MG tablet, 3 tabs once daily for 3 days, 2 tabs once daily for 3 days, 1 tab once daily for 3 days, Disp: 18 tablet, Rfl: 0    promethazine-dextromethorphan (PROMETHAZINE-DM) 6.25-15 MG/5ML syrup, Take 5 mLs by mouth 4 times daily as needed for Cough, Disp: 473 mL, Rfl: 1    amoxicillin (AMOXIL) 500 MG capsule, Take 1 capsule by mouth 2 times daily for 10 days, Disp: 20 capsule, Rfl: 0    carvedilol (COREG) 25 MG tablet, Take 1 tablet by mouth daily daily, Disp: 90 tablet, Rfl: 1    hydroCHLOROthiazide (HYDRODIURIL) 25 MG tablet, Take 1 tablet by mouth daily, Disp: 90 tablet, Rfl: 1    losartan (COZAAR) 100 MG tablet, Take 1 tablet by mouth daily, Disp: 90 tablet, Rfl: 1    SLYND 4 MG TABS, TAKE ONE TABLET BY MOUTH EVERY DAY FOR 3 MONTHS, Disp: , Rfl:     Allergies:   No Known Allergies    Social History:     Social History     Tobacco Use    Smoking status: Never    Smokeless tobacco: Never   Vaping Use    Vaping Use: Never used   Substance Use Topics    Alcohol use: Not Currently    Drug use: Never       Patient lives at home. Physical Exam:     Vitals:    22 1615   BP: 132/80   Pulse: (!) 103   Temp: 97.3 °F (36.3 °C)   SpO2: 99%   Weight: (!) 360 lb (163.3 kg)   Height: 5' 7\" (1.702 m)       Exam:  Physical Exam  Nurse's notes and vital signs reviewed.  The patient is not hypoxic. ? General: Alert, no acute distress, patient resting comfortably Patient is not toxic or lethargic. Skin: Warm, intact, no pallor noted. There is no evidence of rash at this time. Head: Normocephalic, atraumatic  Eye: Normal conjunctiva  Ears, Nose, Throat: Right tympanic membrane clear, left tympanic membrane clear. No drainage or discharge noted. No pre- or post-auricular tenderness, erythema, or swelling noted. Nasal congestion, rhinorrhea, no epistaxis, mild hoarseness  Posterior oropharynx shows no erythema, tonsillar hypertrophy, or exudate. the uvula is midline. No trismus or drooling is noted. Moist mucous membranes. Neck: No anterior/posterior lymphadenopathy noted. No erythema, no masses, no fluctuance or induration noted. No meningeal signs. Cardiovascular: Regular Rate and Rhythm  Respiratory: No acute distress, no rhonchi, wheezing or crackles noted. No stridor or retractions are noted. Neurological: A&O x4, normal speech  Psychiatric: Cooperative       Testing:     Results for orders placed or performed in visit on 11/29/22   POCT COVID-19 Rapid, NAAT   Result Value Ref Range    SARS-COV-2, RdRp gene Negative Negative    Lot Number [id number]     QC Pass/Fail pass      XR CHEST STANDARD (2 VW)    Result Date: 11/29/2022  EXAMINATION: TWO XRAY VIEWS OF THE CHEST 11/29/2022 4:43 pm COMPARISON: None. HISTORY: ORDERING SYSTEM PROVIDED HISTORY: Acute upper respiratory infection, unspecified TECHNOLOGIST PROVIDED HISTORY: Reason for exam:->cough/congestion FINDINGS: The lungs are without acute focal process. There is no effusion or pneumothorax. The cardiomediastinal silhouette is without acute process. The osseous structures are without acute process. No acute process. Medical Decision Making:     Vital signs reviewed    Past medical history reviewed. Allergies reviewed. Medications reviewed.     Patient on arrival does not appear to be in any apparent distress or discomfort. The patient has been seen and evaluated. The patient does not appear to be toxic or lethargic. COVID was negative    Chest x-ray did not show any evidence of acute process. The patient will be treated with doxycycline, albuterol inhaler, prednisone, and Promethazine DM. The patient was educated on the proper dosage of motrin and tylenol and the appropriate intervals of each. The patient is to increase fluid intake over the next several days. The patient is to use OTC decongestant as needed. The patient is to return to express care or go directly to the emergency department should any of the signs or symptoms worsen. The patient is to followup with primary care physician in 2-3 days for repeat evaluation. The patient has no other questions or concerns at this time the patient will be discharged home. Clinical Impression:   Trenton Rousseau was seen today for pharyngitis, hoarse, cough and chest congestion. Diagnoses and all orders for this visit:    Suspected COVID-19 virus infection  -     POCT COVID-19 Rapid, NAAT    Acute upper respiratory infection, unspecified  -     XR CHEST STANDARD (2 VW); Future    Acute non-recurrent sinusitis, unspecified location    Other orders  -     doxycycline hyclate (VIBRA-TABS) 100 MG tablet; Take 1 tablet by mouth 2 times daily for 10 days  -     albuterol sulfate HFA (PROVENTIL;VENTOLIN;PROAIR) 108 (90 Base) MCG/ACT inhaler; Inhale 2 puffs into the lungs 4 times daily as needed for Wheezing  -     predniSONE (DELTASONE) 10 MG tablet; 3 tabs once daily for 3 days, 2 tabs once daily for 3 days, 1 tab once daily for 3 days  -     promethazine-dextromethorphan (PROMETHAZINE-DM) 6.25-15 MG/5ML syrup; Take 5 mLs by mouth 4 times daily as needed for Cough      The patient is to call for any concerns or return if any of the signs or symptoms worsen.  The patient is to follow-up with PCP in the next 2-3 days for repeat evaluation repeat assessment or go directly to the emergency department.      SIGNATURE: Hubert Velasquez III, PA-C

## 2023-01-04 ENCOUNTER — OFFICE VISIT (OUTPATIENT)
Dept: FAMILY MEDICINE CLINIC | Age: 33
End: 2023-01-04
Payer: COMMERCIAL

## 2023-01-04 VITALS
WEIGHT: 293 LBS | DIASTOLIC BLOOD PRESSURE: 82 MMHG | OXYGEN SATURATION: 98 % | HEART RATE: 97 BPM | SYSTOLIC BLOOD PRESSURE: 128 MMHG | TEMPERATURE: 97.2 F | BODY MASS INDEX: 56.7 KG/M2

## 2023-01-04 DIAGNOSIS — E66.01 CLASS 3 SEVERE OBESITY DUE TO EXCESS CALORIES WITH SERIOUS COMORBIDITY AND BODY MASS INDEX (BMI) OF 50.0 TO 59.9 IN ADULT (HCC): Primary | ICD-10-CM

## 2023-01-04 DIAGNOSIS — L40.9 PSORIASIS: ICD-10-CM

## 2023-01-04 PROCEDURE — 3079F DIAST BP 80-89 MM HG: CPT | Performed by: STUDENT IN AN ORGANIZED HEALTH CARE EDUCATION/TRAINING PROGRAM

## 2023-01-04 PROCEDURE — 99214 OFFICE O/P EST MOD 30 MIN: CPT | Performed by: STUDENT IN AN ORGANIZED HEALTH CARE EDUCATION/TRAINING PROGRAM

## 2023-01-04 PROCEDURE — 3074F SYST BP LT 130 MM HG: CPT | Performed by: STUDENT IN AN ORGANIZED HEALTH CARE EDUCATION/TRAINING PROGRAM

## 2023-01-04 RX ORDER — TRIAMCINOLONE ACETONIDE 0.25 MG/G
CREAM TOPICAL
Qty: 80 G | Refills: 0 | Status: SHIPPED | OUTPATIENT
Start: 2023-01-04

## 2023-01-04 RX ORDER — CLOBETASOL PROPIONATE 0.05 G/100ML
SHAMPOO TOPICAL
Qty: 118 ML | Refills: 0 | Status: SHIPPED | OUTPATIENT
Start: 2023-01-04

## 2023-01-04 ASSESSMENT — ENCOUNTER SYMPTOMS
ABDOMINAL PAIN: 0
SHORTNESS OF BREATH: 0
RHINORRHEA: 0
VOMITING: 0
COUGH: 0
NAUSEA: 0

## 2023-01-04 ASSESSMENT — PATIENT HEALTH QUESTIONNAIRE - PHQ9
SUM OF ALL RESPONSES TO PHQ QUESTIONS 1-9: 0
1. LITTLE INTEREST OR PLEASURE IN DOING THINGS: 0
SUM OF ALL RESPONSES TO PHQ QUESTIONS 1-9: 0
SUM OF ALL RESPONSES TO PHQ9 QUESTIONS 1 & 2: 0
SUM OF ALL RESPONSES TO PHQ QUESTIONS 1-9: 0
SUM OF ALL RESPONSES TO PHQ QUESTIONS 1-9: 0
2. FEELING DOWN, DEPRESSED OR HOPELESS: 0

## 2023-01-04 NOTE — PROGRESS NOTES
[facility]  Office Progress Note  Dr. Angelina Noble      Patient:  Marco Pleitez Masters 28 y.o. female     Date of Service: 1/4/23      Chief complaint:   Chief Complaint   Patient presents with    Weight Management         History of Present Illness   The patient is a 28 y.o. female  here to follow up of their obesity    Obesity  -Chronic issue  -Interested in injections/GLP-1  -has been pre diabetic in the past  -hypertensive  -has been making a lot of dietary changes in the new year  -she has a history of PCOS and gestational diabetes  No history of thyroid or pancrease issues  Wt Readings from Last 3 Encounters:   01/04/23 (!) 362 lb (164.2 kg)   11/29/22 (!) 360 lb (163.3 kg)   11/22/22 (!) 366 lb (166 kg)     Hemoglobin A1C   Date Value Ref Range Status   07/19/2022 5.5 4.0 - 5.6 % Final       Posirasis, worse on face and scalp recently. Noticed it was better while on systemic steroids. Associated with itch    Past Medical History:      Diagnosis Date    Diet controlled gestational diabetes mellitus (GDM), antepartum 1/22/2021    Diet controlled gestational diabetes mellitus (GDM), antepartum 1/22/2021    Elevated blood pressure affecting pregnancy in third trimester, antepartum     Encounter for induction of labor 2/5/2021    Hypertension     PCOS (polycystic ovarian syndrome)     Pregnancy with 36 completed weeks gestation 1/27/2021    Primiparity, third trimester 1/14/2021       Review of Systems:   Review of Systems   Constitutional:  Negative for chills and fever. HENT:  Negative for congestion and rhinorrhea. Respiratory:  Negative for cough and shortness of breath. Cardiovascular:  Negative for chest pain and leg swelling. Gastrointestinal:  Negative for abdominal pain, nausea and vomiting. Genitourinary:  Negative for dysuria and hematuria. Musculoskeletal:  Negative for arthralgias and myalgias. Skin:  Positive for rash. Negative for wound.    Neurological:  Negative for dizziness and light-headedness. Physical Exam   Vitals: /82   Pulse 97   Temp 97.2 °F (36.2 °C)   Wt (!) 362 lb (164.2 kg)   SpO2 98%   BMI 56.70 kg/m²   Physical Exam    General:  Well developed, well nourished, well groomed. No apparent distress. HEENT:  Normocephalic, atraumatic. No scleral icterus. No conjunctival injection. No nasal discharge. Heart:  RRR, no murmurs, gallops, or rubs  Lungs:  CTA bilaterally, bilat symmetrical expansion, no wheeze, rales, or rhonchi  Abdomen: Bowel sounds present, soft, nontender, no masses, no organomegaly, no peritoneal signs  Extremities:  No clubbing, cyanosis, or edema  Neuro:  Alert and oriented x3, no focal deficits  Skin: Scaly lesions present-small area to the R of the mouth and on the scalp      Assessment and Plan   Extensively discussed lifestyle management for weight reduction and control mostly surrounding hormonal insulin model of obesity. Will see if we can get GLP-1 covered. Continue dietary changes in the mean time  Steroids for psoriasis  Follow up in 2 months to monitor weight    1. Class 3 severe obesity due to excess calories with serious comorbidity and body mass index (BMI) of 50.0 to 59.9 in adult ([facility]    - Semaglutide-Weight Management (WEGOVY) 0.25 MG/0.5ML SOAJ SC injection; Inject 0.25 mg into the skin every 7 days  Dispense: 2 mL; Refill: 0    2. Psoriasis    - Clobetasol Propionate 0.05 % SHAM; Apply topically as needed  Dispense: 118 mL; Refill: 0  - triamcinolone (KENALOG) 0.025 % cream; Apply topically 2 times daily while having a flare. Dispense: 80 g; Refill: 0    Counseled regarding above diagnosis, including possible risks and complications,  especially if left uncontrolled. Counseled regarding the possible side effects, risks, benefits and alternatives to treatment;patient and/or guardian verbalizes understanding, agrees, feels comfortable with and wishes to proceed with above treatment plan.     Call or go to ED immediately if symptoms worsen or persist. Advised patient to call with any new medication issues, and, as applicable, read all Rx info from pharmacy to assure aware of all possible risks and side effects of medicationbefore taking. Patient and/or guardian given opportunity to ask questions/raise concerns. The patient verbalized comfort and understanding ofinstructions. Return to Office: Return in about 8 weeks (around 3/1/2023). Medication List:    Current Outpatient Medications   Medication Sig Dispense Refill    Semaglutide-Weight Management (WEGOVY) 0.25 MG/0.5ML SOAJ SC injection Inject 0.25 mg into the skin every 7 days 2 mL 0    Clobetasol Propionate 0.05 % SHAM Apply topically as needed 118 mL 0    triamcinolone (KENALOG) 0.025 % cream Apply topically 2 times daily while having a flare. 80 g 0    carvedilol (COREG) 25 MG tablet Take 1 tablet by mouth daily daily 90 tablet 1    hydroCHLOROthiazide (HYDRODIURIL) 25 MG tablet Take 1 tablet by mouth daily 90 tablet 1    losartan (COZAAR) 100 MG tablet Take 1 tablet by mouth daily 90 tablet 1    SLYND 4 MG TABS TAKE ONE TABLET BY MOUTH EVERY DAY FOR 3 MONTHS       No current facility-administered medications for this visit. Gertrude Rajput MD     This document may have been prepared at least partially through the use of voice recognition software. Although effort is taken to assure the accuracy ofthis document, it is possible that grammatical, syntax, or spelling errors may occur.

## 2023-01-10 ENCOUNTER — TELEPHONE (OUTPATIENT)
Dept: FAMILY MEDICINE CLINIC | Age: 33
End: 2023-01-10

## 2023-01-10 NOTE — TELEPHONE ENCOUNTER
Dasia Cuevas called from [employer] for status on PA for [facility]. I did not see a note about a PA request, she will send a fax to the office again.      [address] [facility] [address] Elias  ID# [id number]  [facility]# [id number]  GROUP RX# [id number]

## 2023-01-11 NOTE — TELEPHONE ENCOUNTER
History  PA Detail   View all authorizations for this medication     Closed  1/11/2023 10:32 AM  Case ID: [id number] Close reason: Other   Note from payer: This request cannot be processed due to the medication is not covered by the plan.    Payer:  Valeri White     [phone]         Waiting for Payer Response  1/11/2023 10:31 AM  Case ID: [id number] Reply deadline: January 11, 2024 10:28 AM Sending user: Gina Anderson LPN   Payer:  Valeri Washington and [address] [place] [address]    [phone]      [facility], [facility]. Commercial Electronic PA Form ([id number] NCPDP)  [address]                                Waiting for Payer Response  1/11/2023 10:27 AM  Sending user: Gina Anderson LPN   Payer:  Valeri Washington and [address] [place] [address]    [phone]     [phone]

## 2023-01-12 NOTE — TELEPHONE ENCOUNTER
Tonya Dwyer MD  P [facility]  Clinical Pool  Caller: Unspecified (2 days ago,  9:54 AM)  If patient would like she can call health plan and ask if there is any way to get GLP-1 covered for weight loss. I'm not sure there is. Otherwise continue with lifestyle changes we discussed in office.  Can follow up in 2-3 months to discuss more and monitor progress if she would like

## 2023-01-26 ENCOUNTER — PATIENT MESSAGE (OUTPATIENT)
Dept: FAMILY MEDICINE CLINIC | Age: 33
End: 2023-01-26

## 2023-01-26 ENCOUNTER — OFFICE VISIT (OUTPATIENT)
Dept: FAMILY MEDICINE CLINIC | Age: 33
End: 2023-01-26
Payer: COMMERCIAL

## 2023-01-26 ENCOUNTER — NURSE TRIAGE (OUTPATIENT)
Dept: OTHER | Facility: CLINIC | Age: 33
End: 2023-01-26

## 2023-01-26 ENCOUNTER — HOSPITAL ENCOUNTER (EMERGENCY)
Age: 33
Discharge: LEFT BEFORE DISCHARGE | End: 2023-01-26

## 2023-01-26 VITALS
DIASTOLIC BLOOD PRESSURE: 70 MMHG | WEIGHT: 293 LBS | SYSTOLIC BLOOD PRESSURE: 112 MMHG | HEART RATE: 72 BPM | BODY MASS INDEX: 45.99 KG/M2 | OXYGEN SATURATION: 98 % | RESPIRATION RATE: 20 BRPM | TEMPERATURE: 97.5 F | HEIGHT: 67 IN

## 2023-01-26 DIAGNOSIS — R00.2 PALPITATIONS: Primary | ICD-10-CM

## 2023-01-26 DIAGNOSIS — R00.2 PALPITATIONS: ICD-10-CM

## 2023-01-26 LAB
ALBUMIN SERPL-MCNC: 4.5 G/DL (ref 3.5–5.2)
ALP BLD-CCNC: 64 U/L (ref 35–104)
ALT SERPL-CCNC: 33 U/L (ref 0–32)
ANION GAP SERPL CALCULATED.3IONS-SCNC: 13 MMOL/L (ref 7–16)
AST SERPL-CCNC: 28 U/L (ref 0–31)
BASOPHILS ABSOLUTE: 0.04 E9/L (ref 0–0.2)
BASOPHILS RELATIVE PERCENT: 0.4 % (ref 0–2)
BILIRUB SERPL-MCNC: 0.3 MG/DL (ref 0–1.2)
BUN BLDV-MCNC: 12 MG/DL (ref 6–20)
CALCIUM SERPL-MCNC: 9.5 MG/DL (ref 8.6–10.2)
CHLORIDE BLD-SCNC: 102 MMOL/L (ref 98–107)
CO2: 24 MMOL/L (ref 22–29)
CREAT SERPL-MCNC: 0.8 MG/DL (ref 0.5–1)
EOSINOPHILS ABSOLUTE: 0.2 E9/L (ref 0.05–0.5)
EOSINOPHILS RELATIVE PERCENT: 2 % (ref 0–6)
GFR SERPL CREATININE-BSD FRML MDRD: >60 ML/MIN/1.73
GLUCOSE BLD-MCNC: 105 MG/DL (ref 74–99)
HCT VFR BLD CALC: 41.6 % (ref 34–48)
HEMOGLOBIN: 13.4 G/DL (ref 11.5–15.5)
IMMATURE GRANULOCYTES #: 0.03 E9/L
IMMATURE GRANULOCYTES %: 0.3 % (ref 0–5)
LYMPHOCYTES ABSOLUTE: 2.43 E9/L (ref 1.5–4)
LYMPHOCYTES RELATIVE PERCENT: 24.7 % (ref 20–42)
MAGNESIUM: 1.9 MG/DL (ref 1.6–2.6)
MCH RBC QN AUTO: 28.4 PG (ref 26–35)
MCHC RBC AUTO-ENTMCNC: 32.2 % (ref 32–34.5)
MCV RBC AUTO: 88.1 FL (ref 80–99.9)
MONOCYTES ABSOLUTE: 0.66 E9/L (ref 0.1–0.95)
MONOCYTES RELATIVE PERCENT: 6.7 % (ref 2–12)
NEUTROPHILS ABSOLUTE: 6.46 E9/L (ref 1.8–7.3)
NEUTROPHILS RELATIVE PERCENT: 65.9 % (ref 43–80)
PDW BLD-RTO: 13.7 FL (ref 11.5–15)
PLATELET # BLD: 306 E9/L (ref 130–450)
PMV BLD AUTO: 11.3 FL (ref 7–12)
POTASSIUM SERPL-SCNC: 4 MMOL/L (ref 3.5–5)
RBC # BLD: 4.72 E12/L (ref 3.5–5.5)
SODIUM BLD-SCNC: 139 MMOL/L (ref 132–146)
TOTAL PROTEIN: 7.5 G/DL (ref 6.4–8.3)
TSH SERPL DL<=0.05 MIU/L-ACNC: 1.6 UIU/ML (ref 0.27–4.2)
WBC # BLD: 9.8 E9/L (ref 4.5–11.5)

## 2023-01-26 PROCEDURE — 3078F DIAST BP <80 MM HG: CPT | Performed by: PHYSICIAN ASSISTANT

## 2023-01-26 PROCEDURE — 3074F SYST BP LT 130 MM HG: CPT | Performed by: PHYSICIAN ASSISTANT

## 2023-01-26 PROCEDURE — 93000 ELECTROCARDIOGRAM COMPLETE: CPT | Performed by: PHYSICIAN ASSISTANT

## 2023-01-26 PROCEDURE — 99214 OFFICE O/P EST MOD 30 MIN: CPT | Performed by: PHYSICIAN ASSISTANT

## 2023-01-26 NOTE — PROGRESS NOTES
23  Tyshawn Arredondo : 1990 Sex: female  Age 28 y.o. Subjective:  Chief Complaint   Patient presents with    Irregular Heart Beat         HPI:   Tyshawn Arredondo , 28 y.o. female presents to express care for evaluation of palpitations, irregular heartbeat    HPI  28-year-old female presents to express care for evaluation of irregular heartbeat, palpitations. The patient has had the symptoms ongoing for about the last 3 days. She has been trying to lose weight. The patient has lost about 12 pounds in the last 1 month. The patient has been trying to lose weight. The patient states that she is having these episodes of intermittent palpitations. She is not really experiencing any chest pain, shortness of breath. No syncope. The patient states that it may be related to electrolyte abnormality she is on hydrochlorothiazide as well. The patient does have a history of sleep apnea and has elected to not proceed with the machine. The patient is a nurse at the hospital.  She called her PCP and PCP did place orders for a Holter monitor as well as laboratory studies. Those are pending at this time      ROS:   Unless otherwise stated in this report the patient's positive and negative responses for review of systems for constitutional, eyes, ENT, cardiovascular, respiratory, gastrointestinal, neurological, , musculoskeletal, and integument systems and related systems to the presenting problem are either stated in the history of present illness or were not pertinent or were negative for the symptoms and/or complaints related to the presenting medical problem. Positives and pertinent negatives as per HPI. All others reviewed and are negative.       PMH:     Past Medical History:   Diagnosis Date    Diet controlled gestational diabetes mellitus (GDM), antepartum 2021    Diet controlled gestational diabetes mellitus (GDM), antepartum 2021    Elevated blood pressure affecting pregnancy in third trimester, antepartum     Encounter for induction of labor 2021    Hypertension     PCOS (polycystic ovarian syndrome)     Pregnancy with 36 completed weeks gestation 2021    Primiparity, third trimester 2021       Past Surgical History:   Procedure Laterality Date    APPENDECTOMY  2013     SECTION N/A 2021     SECTION performed by Becca Zelaya MD at [facility] L&D OR       Family History   Problem Relation Age of Onset    Ovarian Cancer Maternal Grandmother 54       Medications:     Current Outpatient Medications:     Semaglutide-Weight Management (WEGOVY) 0.25 MG/0.5ML SOAJ SC injection, Inject 0.25 mg into the skin every 7 days, Disp: 2 mL, Rfl: 0    Clobetasol Propionate 0.05 % SHAM, Apply topically as needed, Disp: 118 mL, Rfl: 0    triamcinolone (KENALOG) 0.025 % cream, Apply topically 2 times daily while having a flare., Disp: 80 g, Rfl: 0    carvedilol (COREG) 25 MG tablet, Take 1 tablet by mouth daily daily, Disp: 90 tablet, Rfl: 1    hydroCHLOROthiazide (HYDRODIURIL) 25 MG tablet, Take 1 tablet by mouth daily, Disp: 90 tablet, Rfl: 1    losartan (COZAAR) 100 MG tablet, Take 1 tablet by mouth daily, Disp: 90 tablet, Rfl: 1    SLYND 4 MG TABS, TAKE ONE TABLET BY MOUTH EVERY DAY FOR 3 MONTHS, Disp: , Rfl:     Allergies:   No Known Allergies    Social History:     Social History     Tobacco Use    Smoking status: Never    Smokeless tobacco: Never   Vaping Use    Vaping Use: Never used   Substance Use Topics    Alcohol use: Not Currently    Drug use: Never       Patient lives at home. Physical Exam:     Vitals:    23 1508   BP: 112/70   Site: Right Upper Arm   Position: Sitting   Cuff Size: Medium Adult   Pulse: 72   Resp: 20   Temp: 97.5 °F (36.4 °C)   TempSrc: Temporal   SpO2: 98%   Weight: (!) 350 lb (158.8 kg)   Height: 5' 7\" (1.702 m)       Exam:  Physical Exam  Nurses note and vital signs reviewed and patient is not hypoxic.     General: The patient appears well and in no apparent distress. Patient is resting comfortably on cart. Skin: Warm, dry, no pallor noted. There is no rash noted. Head: Normocephalic, atraumatic. Eye: Normal conjunctiva  Ears, Nose, Mouth, and Throat: Oral mucosa is moist  Cardiovascular: Regular Rate and Rhythm  Respiratory: Patient is in no distress, no accessory muscle use, lungs are clear to auscultation, no wheezing, crackles or rhonchi  Back: Non-tender, no CVA tenderness bilaterally to percussion. GI: Normal bowel sounds, no tenderness to palpation, no masses appreciated. No rebound, guarding, or rigidity noted. Musculoskeletal: The patient has no evidence of calf tenderness, no pitting edema, symmetrical pulses noted bilaterally  Neurological: A&O x4, normal speech      Testing:           Medical Decision Making:     Vital signs reviewed    Past medical history reviewed. Allergies reviewed. Medications reviewed. Patient on arrival does not appear to be in any apparent distress or discomfort. The patient has been seen and evaluated. The patient does not appear to be toxic or lethargic. EKG: Normal sinus rhythm, rate of 81, left axis deviation, no ST segment elevation, no depression, no ectopy, T wave inversion in lead III. Normal intervals. The patient will have laboratory studies obtained. The patient was set up for a Holter monitor. The patient's not really having any significant pain or discomfort. No shortness of breath. No myalgias or fevers. The patient is to return to express care or go directly to the emergency department should any of the signs or symptoms worsen. The patient is to followup with primary care physician in 2-3 days for repeat evaluation. The patient has no other questions or concerns at this time the patient will be discharged home. Clinical Impression:   Judson Hoffman was seen today for irregular heart beat.     Diagnoses and all orders for this visit:    Palpitations  - EKG 12 lead      The patient is to call for any concerns or return if any of the signs or symptoms worsen. The patient is to follow-up with PCP in the next 2-3 days for repeat evaluation repeat assessment or go directly to the emergency department.      SIGNATURE: Courtney Proctor III, PA-C

## 2023-01-26 NOTE — TELEPHONE ENCOUNTER
Spoke to patient  Pulse ox around 95  HR in the 70s  BP normal  Blood glucose normal  Not SOB, not having chest pain. Has had some palpitations in the past.    Advised to hold off on ER, will place order for labs and holter.  If symptoms are persisting and vitals remains stable I think express would be reasonable to get an ekg and look at any additional lab work the practitioner deems necessary

## 2023-01-26 NOTE — TELEPHONE ENCOUNTER
Location of patient: [place]    Subjective: Caller states \"For the last 3 days, I have been noticing some heart palpitations. My HR does not go over 108. Last night when it happened 138/96  Pulse 76 - . Patient does have a history of arrhythmia. \"     Current Symptoms: Palpitations for 3 minutes. Chest pressure in center of chest, flushing    Onset: 3 days ago; sudden    Associated Symptoms: NA    Pain Severity: 1/10; pressure; intermittent    Temperature: patient denies by unknown method    What has been tried: Hot shower - got worse. LMP:  BC  Pregnant: No    Recommended disposition: See in Office Today    Care advice provided, patient verbalizes understanding; denies any other questions or concerns; instructed to call back for any new or worsening symptoms. Patient/caller agrees to follow-up with PCP     Attention Provider: Thank you for allowing me to participate in the care of your patient. The patient was connected to triage in response to symptoms provided. Please do not respond through this encounter as the response is not directed to a shared pool.           Reason for Disposition   Skipped or extra beat(s) and occurs 4 or more times per minute    Protocols used: Heart Rate and Heartbeat Questions-ADULT-[place]

## 2023-01-26 NOTE — TELEPHONE ENCOUNTER
From: Wojciech PERERA Masters  To: Dr. Jorge L Og  Sent: 1/26/2023 12:02 AM EST  Subject: Heart palpitations    Hi . last we spoke I was making lifestyle changes for weight loss. I'm doing fantastic. However I'm having heart palpitations and irregular beats in the evening sometimes. I have had 2 episodes in 72hours. I have no symptoms other than mild tightness in my chest and I feel flushed. I do not get SOB and this happens even when im at rest. My vitals are normal during this and so is my blood sugar. It's fleeting and usually goes away after an hour or so. I hooked myself up to an EKG lead at work and again it was normal. I'm not sure what is causing it. Any idea what it could be?

## 2023-01-26 NOTE — TELEPHONE ENCOUNTER
Patient called back in. She said she is still having the heart palpitations and irregular heart beats. 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 I explained to her what lyssa his nurse told me that if she were to come here we would do an EKG and if anything is abnormal she would get sent to the ER anyhow. Patient verbalized understanding and said she would go to the ER.

## 2023-01-27 ENCOUNTER — CARE COORDINATION (OUTPATIENT)
Dept: OTHER | Facility: CLINIC | Age: 33
End: 2023-01-27

## 2023-01-27 NOTE — CARE COORDINATION
Ambulatory Care Coordination Note  1/27/2023    ACC: Romana Bowen, RN    ACM attempted to reach patient for introduction to Associate Care Management related to ED LWBS on 1/26/23. Unable to leave HIPAA compliant message due to pt mailbox being full. Will attempt to outreach patient again. Future Appointments   Date Time Provider Marialuisa Holland   4/5/2023  8:45 AM Giovani Pepper MD [address] [place], [place] [address]her SALINAS, RN   Ambulatory Care Manager  Associate Care Management  Cell [phone]  [email or web address]

## 2023-01-30 ENCOUNTER — CARE COORDINATION (OUTPATIENT)
Dept: OTHER | Facility: CLINIC | Age: 33
End: 2023-01-30

## 2023-01-30 NOTE — CARE COORDINATION
Ambulatory Care Coordination Note  2023    ACC: Titus Wong, MICHELET    ACM contacted patient for introduction to Associate Care Management related to ED visit (LWBS) on 23. Verified name and  with patient as identifiers. Patient declines care management at this time, stating No, I am following up with my PCP, Dr. Rebecca Dalton, who has ordered labwork and holter monitor. Pt reports she had contacted her pcp office who told her to go to ED and then when she was waiting in ED, her pcp called and told her go to [facility] walk in clinic; states provider has ordered labwork which has come back normal and has ordered a Holter monitor for her. Pt declines any care management needs at this time, stating she has f/u appt upcoming with pcp and states that she can get in sooner with him, if she needs to. Pt denies any questions in affording or taking her meds or any other concerns. ACM provided contact information via my chart, for self referral if patient would need care management in the future. ACM will sign off at this time. Future Appointments   Date Time Provider Marialuisa Holland   2023  8:45 AM Krishan Romero MD [address] [place] [address] Figueroa SALINAS, RN   Ambulatory Care Manager  Associate Care Management  Cell [phone]  [email or web address]

## 2023-03-06 ENCOUNTER — TELEPHONE (OUTPATIENT)
Dept: NON INVASIVE DIAGNOSTICS | Age: 33
End: 2023-03-06

## 2023-03-06 NOTE — TELEPHONE ENCOUNTER
Spoke with patient and verified address, monitor will be mailed to patient. Patient will call with any questions or concerns.     Electronically signed by Renetta Stratton MA on 3/6/2023 at 11:36 AM

## 2023-03-07 DIAGNOSIS — L40.9 PSORIASIS: ICD-10-CM

## 2023-03-08 RX ORDER — TRIAMCINOLONE ACETONIDE 0.25 MG/G
CREAM TOPICAL
Qty: 80 G | Refills: 0 | Status: SHIPPED | OUTPATIENT
Start: 2023-03-08

## 2023-03-08 NOTE — TELEPHONE ENCOUNTER
Last Appointment:  1/4/2023  Future Appointments   Date Time Provider Marialuisa Holland   4/5/2023  8:45 AM Hoa King  [address] [facility] [address]

## 2023-04-24 ENCOUNTER — TELEPHONE (OUTPATIENT)
Dept: ADMINISTRATIVE | Age: 33
End: 2023-04-24

## 2023-04-24 NOTE — TELEPHONE ENCOUNTER
Patient requesting appointment for right foot injection. LOV 11/2021.  Stated she can come in on any Thursday

## 2023-04-27 ENCOUNTER — OFFICE VISIT (OUTPATIENT)
Dept: PODIATRY | Age: 33
End: 2023-04-27
Payer: COMMERCIAL

## 2023-04-27 VITALS
SYSTOLIC BLOOD PRESSURE: 124 MMHG | BODY MASS INDEX: 54.82 KG/M2 | WEIGHT: 293 LBS | DIASTOLIC BLOOD PRESSURE: 74 MMHG | TEMPERATURE: 98.6 F

## 2023-04-27 DIAGNOSIS — M72.2 PLANTAR FASCIAL FIBROMATOSIS: Primary | ICD-10-CM

## 2023-04-27 DIAGNOSIS — M79.674 PAIN IN TOE OF RIGHT FOOT: ICD-10-CM

## 2023-04-27 PROCEDURE — 99213 OFFICE O/P EST LOW 20 MIN: CPT | Performed by: PODIATRIST

## 2023-04-27 PROCEDURE — 3074F SYST BP LT 130 MM HG: CPT | Performed by: PODIATRIST

## 2023-04-27 PROCEDURE — 20550 NJX 1 TENDON SHEATH/LIGAMENT: CPT | Performed by: PODIATRIST

## 2023-04-27 PROCEDURE — 3078F DIAST BP <80 MM HG: CPT | Performed by: PODIATRIST

## 2023-04-27 RX ORDER — BETAMETHASONE SODIUM PHOSPHATE AND BETAMETHASONE ACETATE 3; 3 MG/ML; MG/ML
4 INJECTION, SUSPENSION INTRA-ARTICULAR; INTRALESIONAL; INTRAMUSCULAR; SOFT TISSUE ONCE
Status: COMPLETED | OUTPATIENT
Start: 2023-04-27 | End: 2023-04-27

## 2023-04-27 RX ORDER — NABUMETONE 500 MG/1
500 TABLET, FILM COATED ORAL 2 TIMES DAILY
Qty: 120 TABLET | Refills: 0 | Status: SHIPPED | OUTPATIENT
Start: 2023-04-27 | End: 2023-06-26

## 2023-04-27 RX ADMIN — BETAMETHASONE SODIUM PHOSPHATE AND BETAMETHASONE ACETATE 4 MG: 3; 3 INJECTION, SUSPENSION INTRA-ARTICULAR; INTRALESIONAL; INTRAMUSCULAR; SOFT TISSUE at 07:53

## 2023-04-27 NOTE — PROGRESS NOTES
23  Zulema Riedel Masters : 1990 Sex: female  Age: 35 y.o. Patient was referred by Snow Brewster MD    Chief Complaint   Patient presents with    Foot Pain     Pt needs an inj right foot        SUBJECTIVE patient is seen today for right heel pain. The plantar fasciitis is acting up.   Foot Pain     Review of Systems  Const: Denies constitutional symptoms  Musculo: Denies symptoms other than stated above  Skin: Denies symptoms other than stated above       Current Outpatient Medications:     nabumetone (RELAFEN) 500 MG tablet, Take 1 tablet by mouth 2 times daily, Disp: 120 tablet, Rfl: 0    triamcinolone (KENALOG) 0.025 % cream, Apply topically 2 times daily while having a flare., Disp: 80 g, Rfl: 0    Semaglutide-Weight Management (WEGOVY) 0.25 MG/0.5ML SOAJ SC injection, Inject 0.25 mg into the skin every 7 days, Disp: 2 mL, Rfl: 0    Clobetasol Propionate 0.05 % SHAM, Apply topically as needed, Disp: 118 mL, Rfl: 0    carvedilol (COREG) 25 MG tablet, Take 1 tablet by mouth daily daily, Disp: 90 tablet, Rfl: 1    hydroCHLOROthiazide (HYDRODIURIL) 25 MG tablet, Take 1 tablet by mouth daily, Disp: 90 tablet, Rfl: 1    losartan (COZAAR) 100 MG tablet, Take 1 tablet by mouth daily, Disp: 90 tablet, Rfl: 1    SLYND 4 MG TABS, TAKE ONE TABLET BY MOUTH EVERY DAY FOR 3 MONTHS, Disp: , Rfl:   No Known Allergies    Past Medical History:   Diagnosis Date    Diet controlled gestational diabetes mellitus (GDM), antepartum 2021    Diet controlled gestational diabetes mellitus (GDM), antepartum 2021    Elevated blood pressure affecting pregnancy in third trimester, antepartum     Encounter for induction of labor 2021    Hypertension     PCOS (polycystic ovarian syndrome)     Pregnancy with 36 completed weeks gestation 2021    Primiparity, third trimester 2021     Past Surgical History:   Procedure Laterality Date    APPENDECTOMY  2013     SECTION N/A 2021

## 2023-05-01 DIAGNOSIS — I10 ESSENTIAL HYPERTENSION: ICD-10-CM

## 2023-05-01 DIAGNOSIS — L40.9 PSORIASIS: ICD-10-CM

## 2023-05-03 RX ORDER — CARVEDILOL 25 MG/1
25 TABLET ORAL DAILY
Qty: 90 TABLET | Refills: 1 | Status: SHIPPED | OUTPATIENT
Start: 2023-05-03

## 2023-05-03 RX ORDER — LOSARTAN POTASSIUM 100 MG/1
100 TABLET ORAL DAILY
Qty: 90 TABLET | Refills: 1 | Status: SHIPPED | OUTPATIENT
Start: 2023-05-03

## 2023-05-03 RX ORDER — CLOBETASOL PROPIONATE 0.05 G/100ML
SHAMPOO TOPICAL
Qty: 118 ML | Refills: 0 | Status: SHIPPED | OUTPATIENT
Start: 2023-05-03

## 2023-05-03 RX ORDER — TRIAMCINOLONE ACETONIDE 0.25 MG/G
CREAM TOPICAL
Qty: 80 G | Refills: 0 | Status: SHIPPED | OUTPATIENT
Start: 2023-05-03

## 2023-05-03 RX ORDER — HYDROCHLOROTHIAZIDE 25 MG/1
25 TABLET ORAL DAILY
Qty: 90 TABLET | Refills: 1 | Status: SHIPPED | OUTPATIENT
Start: 2023-05-03

## 2023-05-09 ENCOUNTER — TELEPHONE (OUTPATIENT)
Dept: OBGYN | Age: 33
End: 2023-05-09

## 2023-05-09 DIAGNOSIS — N94.6 DYSMENORRHEA: Primary | ICD-10-CM

## 2023-05-09 DIAGNOSIS — N94.6 DYSMENORRHEA: ICD-10-CM

## 2023-05-09 RX ORDER — DROSPIRENONE 4 MG/1
1 TABLET, FILM COATED ORAL DAILY
Qty: 28 TABLET | Refills: 3 | Status: SHIPPED | OUTPATIENT
Start: 2023-05-09

## 2023-05-10 RX ORDER — DROSPIRENONE 4 MG/1
1 TABLET, FILM COATED ORAL DAILY
Qty: 28 TABLET | Refills: 3 | OUTPATIENT
Start: 2023-05-10

## 2023-05-30 ENCOUNTER — OFFICE VISIT (OUTPATIENT)
Dept: FAMILY MEDICINE CLINIC | Age: 33
End: 2023-05-30
Payer: COMMERCIAL

## 2023-05-30 VITALS
OXYGEN SATURATION: 97 % | TEMPERATURE: 97.8 F | DIASTOLIC BLOOD PRESSURE: 74 MMHG | WEIGHT: 293 LBS | SYSTOLIC BLOOD PRESSURE: 116 MMHG | BODY MASS INDEX: 53.56 KG/M2 | HEART RATE: 88 BPM

## 2023-05-30 DIAGNOSIS — I10 ESSENTIAL HYPERTENSION: ICD-10-CM

## 2023-05-30 DIAGNOSIS — E66.01 CLASS 3 SEVERE OBESITY DUE TO EXCESS CALORIES WITH SERIOUS COMORBIDITY AND BODY MASS INDEX (BMI) OF 50.0 TO 59.9 IN ADULT (HCC): Primary | ICD-10-CM

## 2023-05-30 PROCEDURE — 3074F SYST BP LT 130 MM HG: CPT | Performed by: STUDENT IN AN ORGANIZED HEALTH CARE EDUCATION/TRAINING PROGRAM

## 2023-05-30 PROCEDURE — 99214 OFFICE O/P EST MOD 30 MIN: CPT | Performed by: STUDENT IN AN ORGANIZED HEALTH CARE EDUCATION/TRAINING PROGRAM

## 2023-05-30 PROCEDURE — 3078F DIAST BP <80 MM HG: CPT | Performed by: STUDENT IN AN ORGANIZED HEALTH CARE EDUCATION/TRAINING PROGRAM

## 2023-05-30 RX ORDER — PHENTERMINE HYDROCHLORIDE 37.5 MG/1
37.5 TABLET ORAL
Qty: 30 TABLET | Refills: 2 | Status: SHIPPED
Start: 2023-05-30 | End: 2023-05-31 | Stop reason: SDUPTHER

## 2023-05-30 SDOH — ECONOMIC STABILITY: INCOME INSECURITY: HOW HARD IS IT FOR YOU TO PAY FOR THE VERY BASICS LIKE FOOD, HOUSING, MEDICAL CARE, AND HEATING?: NOT HARD AT ALL

## 2023-05-30 SDOH — ECONOMIC STABILITY: HOUSING INSECURITY
IN THE LAST 12 MONTHS, WAS THERE A TIME WHEN YOU DID NOT HAVE A STEADY PLACE TO SLEEP OR SLEPT IN A SHELTER (INCLUDING NOW)?: NO

## 2023-05-30 SDOH — ECONOMIC STABILITY: FOOD INSECURITY: WITHIN THE PAST 12 MONTHS, THE FOOD YOU BOUGHT JUST DIDN'T LAST AND YOU DIDN'T HAVE MONEY TO GET MORE.: NEVER TRUE

## 2023-05-30 SDOH — ECONOMIC STABILITY: FOOD INSECURITY: WITHIN THE PAST 12 MONTHS, YOU WORRIED THAT YOUR FOOD WOULD RUN OUT BEFORE YOU GOT MONEY TO BUY MORE.: NEVER TRUE

## 2023-05-30 ASSESSMENT — ENCOUNTER SYMPTOMS
ABDOMINAL PAIN: 0
COUGH: 0
RHINORRHEA: 0
NAUSEA: 0
SHORTNESS OF BREATH: 0
VOMITING: 0

## 2023-05-30 NOTE — PROGRESS NOTES
[facility]  Office Progress Note  Dr. John Ragsdale      Patient:  Andrey Justice Masters 35 y.o. female     Date of Service: 5/30/23      Chief complaint:   Chief Complaint   Patient presents with    Hypertension    Weight Loss         History of Present Illness   The patient is a 35 y.o. female  here to follow up of their HTN and weight loss    Was not able to get GLP-1 covered. She is still interested in other medical options. Has been doing better with lifestyle/diet/exercise changes. Has started to notice improvements in energy. She is treated for HTN, well controlled. Has a history of palpitations/SVT  Wt Readings from Last 3 Encounters:   05/30/23 (!) 342 lb (155.1 kg)   04/27/23 (!) 350 lb (158.8 kg)   01/26/23 (!) 350 lb (158.8 kg)           HTN  Denies chest pain, shortness of breath, leg swelling, headache, vision changes and orthopnea  Currently taking losartan, hctz and coreg  BP Readings from Last 3 Encounters:   05/30/23 116/74   04/27/23 124/74   01/26/23 112/70              Past Medical History:      Diagnosis Date    Diet controlled gestational diabetes mellitus (GDM), antepartum 1/22/2021    Diet controlled gestational diabetes mellitus (GDM), antepartum 1/22/2021    Elevated blood pressure affecting pregnancy in third trimester, antepartum     Encounter for induction of labor 2/5/2021    Hypertension     PCOS (polycystic ovarian syndrome)     Pregnancy with 36 completed weeks gestation 1/27/2021    Primiparity, third trimester 1/14/2021       Review of Systems:   Review of Systems   Constitutional:  Negative for chills and fever. HENT:  Negative for congestion and rhinorrhea. Respiratory:  Negative for cough and shortness of breath. Cardiovascular:  Negative for chest pain and leg swelling. Gastrointestinal:  Negative for abdominal pain, nausea and vomiting. Genitourinary:  Negative for dysuria and hematuria. Musculoskeletal:  Negative for arthralgias and myalgias.

## 2023-05-31 DIAGNOSIS — E66.01 CLASS 3 SEVERE OBESITY DUE TO EXCESS CALORIES WITH SERIOUS COMORBIDITY AND BODY MASS INDEX (BMI) OF 50.0 TO 59.9 IN ADULT (HCC): ICD-10-CM

## 2023-05-31 NOTE — TELEPHONE ENCOUNTER
Patient Comment: [facility] will not fill because it is not on their formulary please resend script to [facility] please

## 2023-06-01 RX ORDER — PHENTERMINE HYDROCHLORIDE 37.5 MG/1
37.5 TABLET ORAL
Qty: 30 TABLET | Refills: 2 | Status: SHIPPED | OUTPATIENT
Start: 2023-06-01 | End: 2023-07-01

## 2023-06-15 ENCOUNTER — OFFICE VISIT (OUTPATIENT)
Dept: OBGYN | Age: 33
End: 2023-06-15
Payer: COMMERCIAL

## 2023-06-15 VITALS
HEART RATE: 74 BPM | DIASTOLIC BLOOD PRESSURE: 76 MMHG | BODY MASS INDEX: 53.24 KG/M2 | WEIGHT: 293 LBS | SYSTOLIC BLOOD PRESSURE: 111 MMHG

## 2023-06-15 DIAGNOSIS — Z01.419 ENCOUNTER FOR GYNECOLOGICAL EXAMINATION: Primary | ICD-10-CM

## 2023-06-15 DIAGNOSIS — N94.6 DYSMENORRHEA: ICD-10-CM

## 2023-06-15 PROCEDURE — 3078F DIAST BP <80 MM HG: CPT | Performed by: OBSTETRICS & GYNECOLOGY

## 2023-06-15 PROCEDURE — 3074F SYST BP LT 130 MM HG: CPT | Performed by: OBSTETRICS & GYNECOLOGY

## 2023-06-15 PROCEDURE — 99213 OFFICE O/P EST LOW 20 MIN: CPT | Performed by: OBSTETRICS & GYNECOLOGY

## 2023-06-15 PROCEDURE — 99385 PREV VISIT NEW AGE 18-39: CPT | Performed by: OBSTETRICS & GYNECOLOGY

## 2023-06-15 RX ORDER — DROSPIRENONE 4 MG/1
1 TABLET, FILM COATED ORAL DAILY
Qty: 28 TABLET | Refills: 11 | Status: SHIPPED | OUTPATIENT
Start: 2023-06-15

## 2023-06-25 ENCOUNTER — PATIENT MESSAGE (OUTPATIENT)
Dept: FAMILY MEDICINE CLINIC | Age: 33
End: 2023-06-25

## 2023-06-25 DIAGNOSIS — E66.01 CLASS 3 SEVERE OBESITY DUE TO EXCESS CALORIES WITH SERIOUS COMORBIDITY AND BODY MASS INDEX (BMI) OF 50.0 TO 59.9 IN ADULT (HCC): Primary | ICD-10-CM

## 2023-06-28 DIAGNOSIS — E66.01 CLASS 3 SEVERE OBESITY DUE TO EXCESS CALORIES WITH SERIOUS COMORBIDITY AND BODY MASS INDEX (BMI) OF 50.0 TO 59.9 IN ADULT (HCC): ICD-10-CM

## 2023-06-28 RX ORDER — PHENTERMINE HYDROCHLORIDE 37.5 MG/1
37.5 TABLET ORAL
Qty: 30 TABLET | Refills: 2 | OUTPATIENT
Start: 2023-06-28 | End: 2023-09-26

## 2023-07-03 RX ORDER — PHENTERMINE HYDROCHLORIDE 37.5 MG/1
37.5 TABLET ORAL
Qty: 30 TABLET | Refills: 0 | Status: SHIPPED | OUTPATIENT
Start: 2023-07-03 | End: 2023-08-02

## 2023-07-27 ENCOUNTER — OFFICE VISIT (OUTPATIENT)
Dept: FAMILY MEDICINE CLINIC | Age: 33
End: 2023-07-27
Payer: COMMERCIAL

## 2023-07-27 VITALS
OXYGEN SATURATION: 98 % | WEIGHT: 293 LBS | TEMPERATURE: 97.8 F | SYSTOLIC BLOOD PRESSURE: 120 MMHG | BODY MASS INDEX: 52.63 KG/M2 | HEART RATE: 103 BPM | DIASTOLIC BLOOD PRESSURE: 72 MMHG

## 2023-07-27 DIAGNOSIS — R09.82 POSTNASAL DRIP: ICD-10-CM

## 2023-07-27 DIAGNOSIS — R09.81 NASAL CONGESTION: ICD-10-CM

## 2023-07-27 DIAGNOSIS — J01.90 ACUTE NON-RECURRENT SINUSITIS, UNSPECIFIED LOCATION: Primary | ICD-10-CM

## 2023-07-27 PROCEDURE — 3078F DIAST BP <80 MM HG: CPT | Performed by: PHYSICIAN ASSISTANT

## 2023-07-27 PROCEDURE — 99213 OFFICE O/P EST LOW 20 MIN: CPT | Performed by: PHYSICIAN ASSISTANT

## 2023-07-27 PROCEDURE — 3074F SYST BP LT 130 MM HG: CPT | Performed by: PHYSICIAN ASSISTANT

## 2023-07-27 RX ORDER — CEFUROXIME AXETIL 500 MG/1
500 TABLET ORAL 2 TIMES DAILY
Qty: 14 TABLET | Refills: 0 | Status: SHIPPED | OUTPATIENT
Start: 2023-07-27 | End: 2023-08-03

## 2023-09-16 LAB
CHOLEST SERPL-MCNC: 165 MG/DL
GLUCOSE SERPL-MCNC: 122 MG/DL (ref 74–99)
HDLC SERPL-MCNC: 47 MG/DL
LDLC SERPL CALC-MCNC: 99 MG/DL
PATIENT FASTING?: YES
TRIGL SERPL-MCNC: 95 MG/DL
VLDLC SERPL CALC-MCNC: 19 MG/DL

## 2023-09-29 ENCOUNTER — OFFICE VISIT (OUTPATIENT)
Dept: FAMILY MEDICINE CLINIC | Age: 33
End: 2023-09-29
Payer: COMMERCIAL

## 2023-09-29 VITALS
SYSTOLIC BLOOD PRESSURE: 124 MMHG | OXYGEN SATURATION: 99 % | TEMPERATURE: 97.7 F | BODY MASS INDEX: 54.82 KG/M2 | WEIGHT: 293 LBS | DIASTOLIC BLOOD PRESSURE: 86 MMHG | HEART RATE: 98 BPM

## 2023-09-29 DIAGNOSIS — R73.03 PRE-DIABETES: ICD-10-CM

## 2023-09-29 DIAGNOSIS — R73.9 HYPERGLYCEMIA: Primary | ICD-10-CM

## 2023-09-29 DIAGNOSIS — Z76.0 MEDICATION REFILL: ICD-10-CM

## 2023-09-29 DIAGNOSIS — I10 ESSENTIAL HYPERTENSION: ICD-10-CM

## 2023-09-29 LAB — HBA1C MFR BLD: 5.8 %

## 2023-09-29 PROCEDURE — 99214 OFFICE O/P EST MOD 30 MIN: CPT | Performed by: STUDENT IN AN ORGANIZED HEALTH CARE EDUCATION/TRAINING PROGRAM

## 2023-09-29 PROCEDURE — 3074F SYST BP LT 130 MM HG: CPT | Performed by: STUDENT IN AN ORGANIZED HEALTH CARE EDUCATION/TRAINING PROGRAM

## 2023-09-29 PROCEDURE — 83036 HEMOGLOBIN GLYCOSYLATED A1C: CPT | Performed by: STUDENT IN AN ORGANIZED HEALTH CARE EDUCATION/TRAINING PROGRAM

## 2023-09-29 PROCEDURE — 3079F DIAST BP 80-89 MM HG: CPT | Performed by: STUDENT IN AN ORGANIZED HEALTH CARE EDUCATION/TRAINING PROGRAM

## 2023-09-29 RX ORDER — CLOBETASOL PROPIONATE 0.05 G/100ML
SHAMPOO TOPICAL
Qty: 118 ML | Refills: 0 | Status: SHIPPED | OUTPATIENT
Start: 2023-09-29

## 2023-09-29 RX ORDER — CARVEDILOL 25 MG/1
25 TABLET ORAL DAILY
Qty: 90 TABLET | Refills: 1 | Status: SHIPPED | OUTPATIENT
Start: 2023-09-29

## 2023-09-29 RX ORDER — TRIAMCINOLONE ACETONIDE 0.25 MG/G
CREAM TOPICAL
Qty: 80 G | Refills: 0 | Status: SHIPPED | OUTPATIENT
Start: 2023-09-29

## 2023-09-29 RX ORDER — LOSARTAN POTASSIUM 100 MG/1
100 TABLET ORAL DAILY
Qty: 90 TABLET | Refills: 1 | Status: SHIPPED | OUTPATIENT
Start: 2023-09-29

## 2023-09-29 RX ORDER — HYDROCHLOROTHIAZIDE 25 MG/1
25 TABLET ORAL DAILY
Qty: 90 TABLET | Refills: 1 | Status: SHIPPED | OUTPATIENT
Start: 2023-09-29

## 2023-09-29 SDOH — ECONOMIC STABILITY: FOOD INSECURITY: WITHIN THE PAST 12 MONTHS, YOU WORRIED THAT YOUR FOOD WOULD RUN OUT BEFORE YOU GOT MONEY TO BUY MORE.: NEVER TRUE

## 2023-09-29 SDOH — ECONOMIC STABILITY: FOOD INSECURITY: WITHIN THE PAST 12 MONTHS, THE FOOD YOU BOUGHT JUST DIDN'T LAST AND YOU DIDN'T HAVE MONEY TO GET MORE.: NEVER TRUE

## 2023-09-29 SDOH — ECONOMIC STABILITY: INCOME INSECURITY: HOW HARD IS IT FOR YOU TO PAY FOR THE VERY BASICS LIKE FOOD, HOUSING, MEDICAL CARE, AND HEATING?: NOT HARD AT ALL

## 2023-09-29 ASSESSMENT — ENCOUNTER SYMPTOMS
ABDOMINAL PAIN: 0
NAUSEA: 0
COUGH: 0
VOMITING: 0
RHINORRHEA: 0
SHORTNESS OF BREATH: 0

## 2023-09-29 NOTE — PROGRESS NOTES
[facility]  Office Progress Note  Dr. Cresencio Nino      Patient:  Karl Romeo Masters 35 y.o. female     Date of Service: 9/29/23      Chief complaint:   Chief Complaint   Patient presents with    Discuss Labs         History of Present Illness   The patient is a 35 y.o. female  here to follow up of their hyperglycemia. She is concerned about the possibility of DM. She has been pre diabetic in the past. She is frustrated with weight loss efforts. Was doing OK on adipex but started getting tachycardia. GLP-1 not covered by insurance  Feels like it is extremely easy to put on weight and runs into a wall when trying to take it off  Hemoglobin A1C   Date Value Ref Range Status   09/29/2023 5.8 % Final     Wt Readings from Last 3 Encounters:   09/29/23 (!) 350 lb (158.8 kg)   07/27/23 (!) 336 lb (152.4 kg)   06/15/23 (!) 339 lb 14.4 oz (154.2 kg)       BP stable and at goal on hctz and coreg. Denies chest pain, shortness of breath, leg swelling, headache, vision changes and orthopnea. BP Readings from Last 3 Encounters:   09/29/23 124/86   07/27/23 120/72   06/15/23 111/76            Past Medical History:      Diagnosis Date    Diet controlled gestational diabetes mellitus (GDM), antepartum 1/22/2021    Diet controlled gestational diabetes mellitus (GDM), antepartum 1/22/2021    Elevated blood pressure affecting pregnancy in third trimester, antepartum     Encounter for induction of labor 2/5/2021    Hypertension     PCOS (polycystic ovarian syndrome)     Pregnancy with 36 completed weeks gestation 1/27/2021    Primiparity, third trimester 1/14/2021       Review of Systems:   Review of Systems   Constitutional:  Negative for chills and fever. HENT:  Negative for congestion and rhinorrhea. Respiratory:  Negative for cough and shortness of breath. Cardiovascular:  Negative for chest pain and leg swelling. Gastrointestinal:  Negative for abdominal pain, nausea and vomiting.    Genitourinary:

## 2023-11-07 ENCOUNTER — PATIENT MESSAGE (OUTPATIENT)
Dept: FAMILY MEDICINE CLINIC | Age: 33
End: 2023-11-07

## 2023-11-07 RX ORDER — OMEPRAZOLE 40 MG/1
40 CAPSULE, DELAYED RELEASE ORAL
Qty: 90 CAPSULE | Refills: 1 | Status: SHIPPED | OUTPATIENT
Start: 2023-11-07

## 2023-11-13 ENCOUNTER — PATIENT MESSAGE (OUTPATIENT)
Dept: FAMILY MEDICINE CLINIC | Age: 33
End: 2023-11-13

## 2024-01-16 ENCOUNTER — PATIENT MESSAGE (OUTPATIENT)
Dept: OBGYN | Age: 34
End: 2024-01-16

## 2024-01-17 NOTE — TELEPHONE ENCOUNTER
From: Wojciech PERERA Masters  To: Dr. Chelsea Julian  Sent: 1/16/2024 10:25 PM EST  Subject: Bleeding    Hi . I am reaching out to mention that I’m having my period for weeks at a time. For almost 2 years I have t had one while on my current BC. I have lost weight recently and it seems like it coincides. Is this normal?

## 2024-01-18 ENCOUNTER — TELEPHONE (OUTPATIENT)
Dept: OBGYN | Age: 34
End: 2024-01-18

## 2024-01-18 NOTE — TELEPHONE ENCOUNTER
Patient states it is not heavy. She has lost 50 lbs in a month and feels that may be a part of it. Did agree to being seen

## 2024-01-18 NOTE — TELEPHONE ENCOUNTER
Regarding: Bleeding  Contact: [phone]  ----- Message from Chelsea Julian DO sent at 1/17/2024  2:20 PM EST -----       ----- Message sent from Honey Elaine LPN to Wojciech Arredondo at 1/17/2024 10:11 AM -----   Thank you for using [employer]. We have received your message and are currently addressing your request. We will get back to you as soon as possible. Thank you.       ----- Message -----       From:Wojciech Arredondo       Sent:1/16/2024 10:25 PM EST         To:Dr. Chelsea Julian    Subject:Bleeding    Hi . I am reaching out to mention that I’m having my period for weeks at a time. For almost 2 years I have t had one while on my current BC. I have lost weight recently and it seems like it coincides. Is this normal?

## 2024-01-25 ENCOUNTER — OFFICE VISIT (OUTPATIENT)
Dept: OBGYN | Age: 34
End: 2024-01-25
Payer: COMMERCIAL

## 2024-01-25 VITALS
SYSTOLIC BLOOD PRESSURE: 127 MMHG | HEART RATE: 79 BPM | BODY MASS INDEX: 51.79 KG/M2 | WEIGHT: 293 LBS | DIASTOLIC BLOOD PRESSURE: 85 MMHG

## 2024-01-25 DIAGNOSIS — N93.9 ABNORMAL UTERINE BLEEDING (AUB): Primary | ICD-10-CM

## 2024-01-25 DIAGNOSIS — E28.2 PCOS (POLYCYSTIC OVARIAN SYNDROME): ICD-10-CM

## 2024-01-25 PROCEDURE — 99212 OFFICE O/P EST SF 10 MIN: CPT | Performed by: OBSTETRICS & GYNECOLOGY

## 2024-01-25 PROCEDURE — 3074F SYST BP LT 130 MM HG: CPT | Performed by: OBSTETRICS & GYNECOLOGY

## 2024-01-25 PROCEDURE — 99213 OFFICE O/P EST LOW 20 MIN: CPT | Performed by: OBSTETRICS & GYNECOLOGY

## 2024-01-25 PROCEDURE — 3079F DIAST BP 80-89 MM HG: CPT | Performed by: OBSTETRICS & GYNECOLOGY

## 2024-01-25 RX ORDER — ACETAMINOPHEN AND CODEINE PHOSPHATE 120; 12 MG/5ML; MG/5ML
1 SOLUTION ORAL DAILY
Qty: 30 TABLET | Refills: 5 | Status: SHIPPED | OUTPATIENT
Start: 2024-01-25

## 2024-01-25 ASSESSMENT — PATIENT HEALTH QUESTIONNAIRE - PHQ9
SUM OF ALL RESPONSES TO PHQ9 QUESTIONS 1 & 2: 0
SUM OF ALL RESPONSES TO PHQ9 QUESTIONS 1 & 2: 0
2. FEELING DOWN, DEPRESSED OR HOPELESS: NOT AT ALL
SUM OF ALL RESPONSES TO PHQ QUESTIONS 1-9: 0
2. FEELING DOWN, DEPRESSED OR HOPELESS: 0
SUM OF ALL RESPONSES TO PHQ QUESTIONS 1-9: 0
1. LITTLE INTEREST OR PLEASURE IN DOING THINGS: 0
1. LITTLE INTEREST OR PLEASURE IN DOING THINGS: NOT AT ALL
SUM OF ALL RESPONSES TO PHQ QUESTIONS 1-9: 0
SUM OF ALL RESPONSES TO PHQ QUESTIONS 1-9: 0

## 2024-01-25 NOTE — PROGRESS NOTES
Patient here for vaginal bleeding. LMP 1/4/24 patient bled until 1/22/24 but prior to this period patient has not had a period taking the medication SLYND at all. Last pap 6/8/22

## 2024-01-25 NOTE — PROGRESS NOTES
HISTORY OF PRESENT ILLNESS:    33 y.o. female   presents with complaint of vaginal bleeding nearly 3 wks. Recent weight loss. Pt lost 25 lbs in 25 days.   Previously not having cycles on slynd.     Pt would like to switch to norethindrone for better pricing.     Past Medical History:   Past Medical History:   Diagnosis Date    Diet controlled gestational diabetes mellitus (GDM), antepartum 2021    Diet controlled gestational diabetes mellitus (GDM), antepartum 2021    Elevated blood pressure affecting pregnancy in third trimester, antepartum     Encounter for induction of labor 2021    Hypertension     PCOS (polycystic ovarian syndrome)     Pregnancy with 36 completed weeks gestation 2021    Primiparity, third trimester 2021                                             OB History    Para Term  AB Living   1 1 1     1   SAB IAB Ectopic Molar Multiple Live Births           0 1      # Outcome Date GA Lbr Guillaume/2nd Weight Sex Delivery Anes PTL Lv   1 Term 21 38w0d  2.807 kg (6 lb 3 oz) F CS-LTranv EPI N RODGER      Complications: Failure to Progress in First Stage         Past Surgical History:   Past Surgical History:   Procedure Laterality Date    APPENDECTOMY  2013     SECTION N/A 2021     SECTION performed by Keith Candelario MD at [facility] L&D OR        Allergies: Patient has no known allergies.     Medications:   Current Outpatient Medications   Medication Sig Dispense Refill    norethindrone (ORTHO MICRONOR) 0.35 MG tablet Take 1 tablet by mouth daily 30 tablet 5    carvedilol (COREG) 25 MG tablet Take 1 tablet by mouth daily daily 90 tablet 1    Clobetasol Propionate 0.05 % SHAM Apply topically as needed 118 mL 0    hydroCHLOROthiazide (HYDRODIURIL) 25 MG tablet Take 1 tablet by mouth daily 90 tablet 1    losartan (COZAAR) 100 MG tablet Take 1 tablet by mouth daily 90 tablet 1    triamcinolone (KENALOG) 0.025 % cream Apply topically 2 times

## 2024-01-31 ENCOUNTER — HOSPITAL ENCOUNTER (OUTPATIENT)
Age: 34
Discharge: HOME | End: 2024-01-31
Payer: COMMERCIAL

## 2024-01-31 DIAGNOSIS — N93.9 ABNORMAL UTERINE BLEEDING (AUB): ICD-10-CM

## 2024-01-31 DIAGNOSIS — E28.2 PCOS (POLYCYSTIC OVARIAN SYNDROME): ICD-10-CM

## 2024-01-31 LAB
B-HCG SERPL EIA 3RD IS-ACNC: <0.1 MIU/ML (ref 0–7)
ERYTHROCYTE [DISTWIDTH] IN BLOOD BY AUTOMATED COUNT: 13.5 % (ref 11.5–15)
FSH SERPL-ACNC: 5.8 MIU/ML
HCT VFR BLD AUTO: 43.6 % (ref 34–48)
HGB BLD-MCNC: 14.2 G/DL (ref 11.5–15.5)
MCH RBC QN AUTO: 29.1 PG (ref 26–35)
MCHC RBC AUTO-ENTMCNC: 32.6 G/DL (ref 32–34.5)
MCV RBC AUTO: 89.3 FL (ref 80–99.9)
PLATELET # BLD AUTO: 281 K/UL (ref 130–450)
PMV BLD AUTO: 10.6 FL (ref 7–12)
PROLACTIN SERPL-MCNC: 19.51 NG/ML
RBC # BLD AUTO: 4.88 M/UL (ref 3.5–5.5)
TSH SERPL DL<=0.05 MIU/L-ACNC: 1.81 UIU/ML (ref 0.27–4.2)
WBC OTHER # BLD: 12.3 K/UL (ref 4.5–11.5)

## 2024-01-31 PROCEDURE — 84443 ASSAY THYROID STIM HORMONE: CPT

## 2024-01-31 PROCEDURE — 84702 CHORIONIC GONADOTROPIN TEST: CPT

## 2024-01-31 PROCEDURE — 83001 ASSAY OF GONADOTROPIN (FSH): CPT

## 2024-01-31 PROCEDURE — 85027 COMPLETE CBC AUTOMATED: CPT

## 2024-01-31 PROCEDURE — 84146 ASSAY OF PROLACTIN: CPT

## 2024-03-01 DIAGNOSIS — Z76.0 MEDICATION REFILL: ICD-10-CM

## 2024-03-01 DIAGNOSIS — I10 ESSENTIAL HYPERTENSION: ICD-10-CM

## 2024-03-01 RX ORDER — HYDROCHLOROTHIAZIDE 25 MG/1
25 TABLET ORAL DAILY
Qty: 90 TABLET | Refills: 1 | Status: SHIPPED | OUTPATIENT
Start: 2024-03-01

## 2024-03-01 RX ORDER — CARVEDILOL 25 MG/1
25 TABLET ORAL DAILY
Qty: 90 TABLET | Refills: 1 | Status: SHIPPED | OUTPATIENT
Start: 2024-03-01

## 2024-03-01 RX ORDER — LOSARTAN POTASSIUM 100 MG/1
100 TABLET ORAL DAILY
Qty: 90 TABLET | Refills: 1 | Status: SHIPPED | OUTPATIENT
Start: 2024-03-01

## 2024-03-01 RX ORDER — TRIAMCINOLONE ACETONIDE 0.25 MG/G
CREAM TOPICAL
Qty: 80 G | Refills: 0 | Status: SHIPPED | OUTPATIENT
Start: 2024-03-01

## 2024-03-01 NOTE — TELEPHONE ENCOUNTER
Last Appointment:  9/29/2023  Future Appointments   Date Time Provider Department Center   3/6/2024  5:00 PM [facility] US RM 2 [facility] US [facility]   6/20/2024 11:00 AM Chelsea Julian DO [facility]

## 2024-04-04 ENCOUNTER — PATIENT MESSAGE (OUTPATIENT)
Dept: OBGYN | Age: 34
End: 2024-04-04

## 2024-04-04 RX ORDER — FLUCONAZOLE 150 MG/1
150 TABLET ORAL DAILY
Qty: 1 TABLET | Refills: 0 | Status: SHIPPED
Start: 2024-04-04 | End: 2024-04-05

## 2024-04-04 NOTE — TELEPHONE ENCOUNTER
From: Wojciech PERERA Masters  To: Dr. Chelsea Julian  Sent: 4/4/2024 1:27 PM EDT  Subject: Burning and itchy     Hi .   I am having vulvar burning and itchiness all the time and not when I pee.

## 2024-04-05 ENCOUNTER — OFFICE VISIT (OUTPATIENT)
Dept: FAMILY MEDICINE CLINIC | Age: 34
End: 2024-04-05
Payer: COMMERCIAL

## 2024-04-05 VITALS
WEIGHT: 293 LBS | TEMPERATURE: 97.3 F | OXYGEN SATURATION: 96 % | DIASTOLIC BLOOD PRESSURE: 72 MMHG | BODY MASS INDEX: 45.99 KG/M2 | HEIGHT: 67 IN | HEART RATE: 84 BPM | SYSTOLIC BLOOD PRESSURE: 124 MMHG

## 2024-04-05 DIAGNOSIS — J04.0 ACUTE LARYNGITIS: ICD-10-CM

## 2024-04-05 DIAGNOSIS — R09.81 NASAL CONGESTION: ICD-10-CM

## 2024-04-05 DIAGNOSIS — R09.82 POSTNASAL DRIP: ICD-10-CM

## 2024-04-05 DIAGNOSIS — J01.90 ACUTE NON-RECURRENT SINUSITIS, UNSPECIFIED LOCATION: ICD-10-CM

## 2024-04-05 DIAGNOSIS — J06.9 ACUTE UPPER RESPIRATORY INFECTION, UNSPECIFIED: Primary | ICD-10-CM

## 2024-04-05 PROCEDURE — 3078F DIAST BP <80 MM HG: CPT | Performed by: PHYSICIAN ASSISTANT

## 2024-04-05 PROCEDURE — 3074F SYST BP LT 130 MM HG: CPT | Performed by: PHYSICIAN ASSISTANT

## 2024-04-05 PROCEDURE — 99213 OFFICE O/P EST LOW 20 MIN: CPT | Performed by: PHYSICIAN ASSISTANT

## 2024-04-05 RX ORDER — METHYLPREDNISOLONE 4 MG/1
TABLET ORAL
Qty: 1 KIT | Refills: 0 | Status: SHIPPED | OUTPATIENT
Start: 2024-04-05

## 2024-04-05 RX ORDER — AMOXICILLIN AND CLAVULANATE POTASSIUM 875; 125 MG/1; MG/1
1 TABLET, FILM COATED ORAL 2 TIMES DAILY
Qty: 20 TABLET | Refills: 0 | Status: SHIPPED | OUTPATIENT
Start: 2024-04-05 | End: 2024-04-15

## 2024-04-05 NOTE — PROGRESS NOTES
this time.  Head: Normocephalic, atraumatic  Eye: Normal conjunctiva  Ears, Nose, Throat: Right tympanic membrane clear, left tympanic membrane clear. No drainage or discharge noted. No pre- or post-auricular tenderness, erythema, or swelling noted.   Nasal congestion, rhinorrhea  Posterior oropharynx shows erythema and cobblestoning but no evidence of tonsillar hypertrophy, or exudate. the uvula is midline. No trismus or drooling is noted.   Moist mucous membranes.  Neck: No anterior/posterior lymphadenopathy noted. No erythema, no masses, no fluctuance or induration noted. No meningeal signs.  Cardiovascular: Regular Rate and Rhythm  Respiratory: No acute distress, no rhonchi, wheezing or crackles noted. No stridor or retractions are noted.  Neurological: A&O x4, normal speech  Psychiatric: Cooperative       Testing:     No results found for this visit on 04/05/24.        Medical Decision Making:     Vital signs reviewed    Past medical history reviewed.    Allergies reviewed.    Medications reviewed.    Patient on arrival does not appear to be in any apparent distress or discomfort.  The patient has been seen and evaluated.  The patient does not appear to be toxic or lethargic.     The patient will be treated with Augmentin and Medrol Dosepak at this point.    The patient may continue with the singular, Zyrtec, nasal spray if needed.  Will see if these help improve the symptoms.  There is no wheezing currently.  The patient's vital signs are all stable.    The patient is to return to express care or go directly to the emergency department should any of the signs or symptoms worsen. The patient is to followup with primary care physician in 2-3 days for repeat evaluation. The patient has no other questions or concerns at this time the patient will be discharged home.      Clinical Impression:   Wojciech was seen today for congestion, cough, drainage and wheezing.    Diagnoses and all orders for this visit:    Acute

## 2024-04-09 ENCOUNTER — TELEPHONE (OUTPATIENT)
Dept: FAMILY MEDICINE CLINIC | Age: 34
End: 2024-04-09

## 2024-04-09 NOTE — TELEPHONE ENCOUNTER
Wojciech calling back in, the Wednesday time we offered does not work for her. She is asking if maybe you can see her Thursday early afternoon around 1:00 PM-hunter, or Friday morning though that would need to be no later than 9:30 AM due to her working at 11 AM. I did explain your schedule was full, but that I would send a message over to you to ask if you could accommodate either of those. She can do a VV if you are OK with that, or a telephone call.     She states that she was seen in the [facility], where they diagnosed her with laryngitis and a sinus infection, and they did give her medication but she is still feeling tight and congested. She states she has also been using her albuterol inhaler to help clear her up some.    Where would you advise we offer to schedule since initially provided appt did not work? I did let her know we would call her once you responded.

## 2024-04-09 NOTE — TELEPHONE ENCOUNTER
I see your schedule is full for tomorrow; would you like us to offer [facility], or where would you recommend we schedule Wojciech?  ------------------------------------------------------------------------------------  Appointment Request From: Wojciech PERERA Masters     With Provider: Jorge L Og MD [[facility]]     Preferred Date Range: 4/10/2024 - 4/10/2024     Preferred Times: Wednesday Afternoon     Reason for visit: Request an Appointment     Comments:  Follow up sick visit still feeling congestion in chest

## 2024-04-12 ENCOUNTER — TELEMEDICINE (OUTPATIENT)
Dept: FAMILY MEDICINE CLINIC | Age: 34
End: 2024-04-12
Payer: COMMERCIAL

## 2024-04-12 DIAGNOSIS — J40 BRONCHITIS: Primary | ICD-10-CM

## 2024-04-12 PROCEDURE — 99214 OFFICE O/P EST MOD 30 MIN: CPT | Performed by: STUDENT IN AN ORGANIZED HEALTH CARE EDUCATION/TRAINING PROGRAM

## 2024-04-12 RX ORDER — FLUTICASONE PROPIONATE 220 UG/1
2 AEROSOL, METERED RESPIRATORY (INHALATION) 2 TIMES DAILY
Qty: 12 G | Refills: 0 | Status: SHIPPED | OUTPATIENT
Start: 2024-04-12 | End: 2025-04-12

## 2024-04-12 RX ORDER — ALBUTEROL SULFATE 90 UG/1
2 AEROSOL, METERED RESPIRATORY (INHALATION) 4 TIMES DAILY PRN
Qty: 18 G | Refills: 0 | Status: SHIPPED | OUTPATIENT
Start: 2024-04-12

## 2024-04-12 RX ORDER — BENZONATATE 200 MG/1
200 CAPSULE ORAL 3 TIMES DAILY PRN
Qty: 30 CAPSULE | Refills: 0 | Status: SHIPPED | OUTPATIENT
Start: 2024-04-12 | End: 2024-04-19

## 2024-04-12 RX ORDER — PREDNISONE 20 MG/1
40 TABLET ORAL DAILY
Qty: 8 TABLET | Refills: 0 | Status: SHIPPED | OUTPATIENT
Start: 2024-04-12 | End: 2024-04-16

## 2024-04-12 ASSESSMENT — ENCOUNTER SYMPTOMS
ABDOMINAL PAIN: 0
NAUSEA: 0
RHINORRHEA: 0
VOMITING: 0
SHORTNESS OF BREATH: 1
CHEST TIGHTNESS: 1
COUGH: 1
WHEEZING: 1

## 2024-04-12 NOTE — PROGRESS NOTES
2024    TELEHEALTH EVALUATION -- Audio/Visual    HPI:    Wojciech Arredondo (:  1990) has requested an audio/video evaluation for the following concern(s): Shortness of breath and cough    Nagging drainage, cough  On day 7-8 of augmentin  Has a cough and some upper chest congestion  Coughing is becoming painful  SOB  She is wheezing  Albuterol has helped slightly  Has used mucinex, zyrtec    Review of Systems   Constitutional:  Positive for fatigue. Negative for chills and fever.   HENT:  Negative for congestion and rhinorrhea.    Respiratory:  Positive for cough, chest tightness, shortness of breath and wheezing.    Cardiovascular:  Negative for chest pain and leg swelling.   Gastrointestinal:  Negative for abdominal pain, nausea and vomiting.   Genitourinary:  Negative for dysuria and hematuria.   Musculoskeletal:  Negative for arthralgias and myalgias.   Skin:  Negative for rash and wound.   Neurological:  Negative for dizziness and light-headedness.       Prior to Visit Medications    Medication Sig Taking? Authorizing Provider   fluticasone (FLOVENT HFA) 220 MCG/ACT inhaler Inhale 2 puffs into the lungs 2 times daily Yes Jorge L Og MD   albuterol sulfate HFA (VENTOLIN HFA) 108 (90 Base) MCG/ACT inhaler Inhale 2 puffs into the lungs 4 times daily as needed for Wheezing Yes Jorge L Og MD   predniSONE (DELTASONE) 20 MG tablet Take 2 tablets by mouth daily for 4 days Yes Jorge L Og MD   benzonatate (TESSALON) 200 MG capsule Take 1 capsule by mouth 3 times daily as needed for Cough Yes Jorge L Og MD   amoxicillin (AMOXIL) 500 MG capsule Take 1 capsule by mouth 2 times daily for 10 days  Jorge L Og MD   amoxicillin-clavulanate (AUGMENTIN) 875-125 MG per tablet Take 1 tablet by mouth 2 times daily for 10 days  Luis Fernando Somers III, PA   methylPREDNISolone (MEDROL DOSEPACK) 4 MG tablet Take by mouth.  Luis Fernando Somers III, PA   carvedilol (COREG) 25 MG tablet Take 1

## 2024-04-14 ENCOUNTER — HOSPITAL ENCOUNTER (OUTPATIENT)
Dept: GENERAL RADIOLOGY | Age: 34
Discharge: HOME | End: 2024-04-16
Payer: COMMERCIAL

## 2024-04-14 ENCOUNTER — HOSPITAL ENCOUNTER (OUTPATIENT)
Age: 34
Discharge: HOME | End: 2024-04-16
Payer: COMMERCIAL

## 2024-04-14 DIAGNOSIS — J40 BRONCHITIS: ICD-10-CM

## 2024-04-14 PROCEDURE — 71046 X-RAY EXAM CHEST 2 VIEWS: CPT

## 2024-04-15 ENCOUNTER — PATIENT MESSAGE (OUTPATIENT)
Dept: FAMILY MEDICINE CLINIC | Age: 34
End: 2024-04-15

## 2024-04-15 NOTE — TELEPHONE ENCOUNTER
From: Wojciech PERERA Masters  To: Dr. Jorge L Og  Sent: 4/15/2024 7:08 AM EDT  Subject: Cough    Hello. I’m feeling better with the steroid. Still coughing and wheezing fits but not as much chest tightness. I have today left on the prednisone.

## 2024-04-16 ENCOUNTER — PATIENT MESSAGE (OUTPATIENT)
Dept: OBGYN | Age: 34
End: 2024-04-16

## 2024-04-16 RX ORDER — PREDNISONE 10 MG/1
30 TABLET ORAL DAILY
Qty: 9 TABLET | Refills: 0 | Status: SHIPPED | OUTPATIENT
Start: 2024-04-16 | End: 2024-04-19

## 2024-04-17 NOTE — TELEPHONE ENCOUNTER
From: Wojciech PERERA Masters  To: Dr. Chelsea Julian  Sent: 4/16/2024 1:01 PM EDT  Subject: Breast tenderness     Hi    I am experiencing right sided breast soreness. It’s so tender in one area that even brushing over it with a sheet or bumping it makes it hurt. I just finished my period 2 days ago and have no chance for pregnancy.

## 2024-04-29 DIAGNOSIS — I10 ESSENTIAL HYPERTENSION: ICD-10-CM

## 2024-04-29 RX ORDER — HYDROCHLOROTHIAZIDE 25 MG/1
25 TABLET ORAL DAILY
Qty: 90 TABLET | Refills: 1 | Status: SHIPPED | OUTPATIENT
Start: 2024-04-29

## 2024-04-29 RX ORDER — LOSARTAN POTASSIUM 100 MG/1
100 TABLET ORAL DAILY
Qty: 90 TABLET | Refills: 1 | Status: SHIPPED | OUTPATIENT
Start: 2024-04-29

## 2024-04-29 RX ORDER — CARVEDILOL 25 MG/1
25 TABLET ORAL DAILY
Qty: 90 TABLET | Refills: 1 | Status: SHIPPED | OUTPATIENT
Start: 2024-04-29

## 2024-04-29 NOTE — TELEPHONE ENCOUNTER
Last Appointment:  4/12/2024  Future Appointments   Date Time Provider Department Center   5/2/2024  8:00 AM [facility]   6/20/2024 11:00 AM [facility]

## 2024-05-02 ENCOUNTER — OFFICE VISIT (OUTPATIENT)
Dept: OBGYN | Age: 34
End: 2024-05-02
Payer: COMMERCIAL

## 2024-05-02 VITALS
HEART RATE: 83 BPM | BODY MASS INDEX: 55.55 KG/M2 | SYSTOLIC BLOOD PRESSURE: 108 MMHG | DIASTOLIC BLOOD PRESSURE: 73 MMHG | WEIGHT: 293 LBS

## 2024-05-02 DIAGNOSIS — I49.3 FREQUENT PVCS: Primary | ICD-10-CM

## 2024-05-02 DIAGNOSIS — N64.4 BREAST PAIN, RIGHT: Primary | ICD-10-CM

## 2024-05-02 PROCEDURE — 99213 OFFICE O/P EST LOW 20 MIN: CPT | Performed by: OBSTETRICS & GYNECOLOGY

## 2024-05-02 PROCEDURE — 3074F SYST BP LT 130 MM HG: CPT | Performed by: OBSTETRICS & GYNECOLOGY

## 2024-05-02 PROCEDURE — 3078F DIAST BP <80 MM HG: CPT | Performed by: OBSTETRICS & GYNECOLOGY

## 2024-05-02 PROCEDURE — 99212 OFFICE O/P EST SF 10 MIN: CPT | Performed by: OBSTETRICS & GYNECOLOGY

## 2024-05-03 ENCOUNTER — NURSE ONLY (OUTPATIENT)
Dept: CARDIOLOGY CLINIC | Age: 34
End: 2024-05-03

## 2024-05-03 NOTE — PROGRESS NOTES
Patient was seen today and a 7 day monitor was placed. Monitor was ordered by Dr. Castro. The monitor was applied, instructions were given to the patient. Patient stated understanding and gave verbalize readback.   Monitor company: [employer]  Serial number: [id number]

## 2024-05-07 NOTE — PROGRESS NOTES
HISTORY OF PRESENT ILLNESS:    Pt c/o right breast TTP x4 wks. No lump or breast discharge.       Past Medical History:   Past Medical History:   Diagnosis Date    Diet controlled gestational diabetes mellitus (GDM), antepartum 2021    Diet controlled gestational diabetes mellitus (GDM), antepartum 2021    Elevated blood pressure affecting pregnancy in third trimester, antepartum     Encounter for induction of labor 2021    Hypertension     PCOS (polycystic ovarian syndrome)     Pregnancy with 36 completed weeks gestation 2021    Primiparity, third trimester 2021                                             OB History    Para Term  AB Living   1 1 1     1   SAB IAB Ectopic Molar Multiple Live Births           0 1      # Outcome Date GA Lbr Guillaume/2nd Weight Sex Delivery Anes PTL Lv   1 Term 21 38w0d  2.807 kg (6 lb 3 oz) F CS-LTranv EPI N RODGER      Complications: Failure to Progress in First Stage          Past Surgical History:   Past Surgical History:   Procedure Laterality Date    APPENDECTOMY       SECTION N/A 2021     SECTION performed by Keith Candelario MD at [facility] L[facility]D [facility]        Allergies: Patient has no known allergies.     Medications:   Current Outpatient Medications   Medication Sig Dispense Refill    carvedilol (COREG) 25 MG tablet Take 1 tablet by mouth daily daily 90 tablet 1    hydroCHLOROthiazide (HYDRODIURIL) 25 MG tablet Take 1 tablet by mouth daily 90 tablet 1    losartan (COZAAR) 100 MG tablet Take 1 tablet by mouth daily 90 tablet 1    triamcinolone (KENALOG) 0.025 % cream Apply topically 2 times daily while having a flare. 80 g 0    norethindrone (ORTHO MICRONOR) 0.35 MG tablet Take 1 tablet by mouth daily 30 tablet 5    Clobetasol Propionate 0.05 % SHAM Apply topically as needed 118 mL 0    fluticasone (FLOVENT HFA) 220 MCG/ACT inhaler Inhale 2 puffs into the lungs 2 times daily (Patient not taking: Reported on 2024) 12

## 2024-05-15 ENCOUNTER — HOSPITAL ENCOUNTER (OUTPATIENT)
Dept: ULTRASOUND IMAGING | Age: 34
Discharge: HOME | End: 2024-05-17
Attending: OBSTETRICS & GYNECOLOGY
Payer: COMMERCIAL

## 2024-05-15 DIAGNOSIS — E28.2 PCOS (POLYCYSTIC OVARIAN SYNDROME): ICD-10-CM

## 2024-05-15 DIAGNOSIS — N93.9 ABNORMAL UTERINE BLEEDING (AUB): ICD-10-CM

## 2024-05-15 PROCEDURE — 76830 TRANSVAGINAL US NON-OB: CPT

## 2024-05-24 ENCOUNTER — OFFICE VISIT (OUTPATIENT)
Dept: FAMILY MEDICINE CLINIC | Age: 34
End: 2024-05-24
Payer: COMMERCIAL

## 2024-05-24 VITALS
WEIGHT: 293 LBS | OXYGEN SATURATION: 98 % | HEIGHT: 67 IN | BODY MASS INDEX: 45.99 KG/M2 | TEMPERATURE: 98.7 F | DIASTOLIC BLOOD PRESSURE: 64 MMHG | HEART RATE: 91 BPM | SYSTOLIC BLOOD PRESSURE: 118 MMHG

## 2024-05-24 DIAGNOSIS — H66.92 LEFT OTITIS MEDIA, UNSPECIFIED OTITIS MEDIA TYPE: Primary | ICD-10-CM

## 2024-05-24 DIAGNOSIS — J06.9 ACUTE UPPER RESPIRATORY INFECTION, UNSPECIFIED: ICD-10-CM

## 2024-05-24 DIAGNOSIS — R09.82 POSTNASAL DRIP: ICD-10-CM

## 2024-05-24 DIAGNOSIS — J01.90 ACUTE NON-RECURRENT SINUSITIS, UNSPECIFIED LOCATION: ICD-10-CM

## 2024-05-24 DIAGNOSIS — R09.81 NASAL CONGESTION: ICD-10-CM

## 2024-05-24 PROCEDURE — 3078F DIAST BP <80 MM HG: CPT | Performed by: PHYSICIAN ASSISTANT

## 2024-05-24 PROCEDURE — 3074F SYST BP LT 130 MM HG: CPT | Performed by: PHYSICIAN ASSISTANT

## 2024-05-24 PROCEDURE — 99213 OFFICE O/P EST LOW 20 MIN: CPT | Performed by: PHYSICIAN ASSISTANT

## 2024-05-24 RX ORDER — METHYLPREDNISOLONE 4 MG/1
TABLET ORAL
Qty: 1 KIT | Refills: 0 | Status: SHIPPED | OUTPATIENT
Start: 2024-05-24

## 2024-05-24 RX ORDER — AMOXICILLIN AND CLAVULANATE POTASSIUM 875; 125 MG/1; MG/1
1 TABLET, FILM COATED ORAL 2 TIMES DAILY
Qty: 20 TABLET | Refills: 0 | Status: SHIPPED | OUTPATIENT
Start: 2024-05-24 | End: 2024-06-03

## 2024-05-24 NOTE — PROGRESS NOTES
24  Wojciech Arredondo : 1990 Sex: female  Age 34 y.o.      Subjective:  Chief Complaint   Patient presents with    Otalgia     left         HPI:   HPI  Wojciech Arredondo , 34 y.o. female presents to [facility] care for evaluation of left ear pain.  The patient has noted the symptoms since the beginning of the week.  The patient notes some pain to the left ear.  She does note some congestion, drainage and cough.  When she coughs she notes that she can actually feel some of the fluid go from her ear down the eustachian tube to her throat.  The patient has not any fevers, chills.  No chest pain, shortness of breath.  The patient is not currently on any antibiotics.  She was taking ibuprofen without much improvement.  Really has not been taking any decongestants.      ROS:   Unless otherwise stated in this report the patient's positive and negative responses for review of systems for constitutional, eyes, ENT, cardiovascular, respiratory, gastrointestinal, neurological, , musculoskeletal, and integument systems and related systems to the presenting problem are either stated in the history of present illness or were not pertinent or were negative for the symptoms and/or complaints related to the presenting medical problem.  Positives and pertinent negatives as per HPI.  All others reviewed and are negative.      PMH:     Past Medical History:   Diagnosis Date    Diet controlled gestational diabetes mellitus (GDM), antepartum 2021    Diet controlled gestational diabetes mellitus (GDM), antepartum 2021    Elevated blood pressure affecting pregnancy in third trimester, antepartum     Encounter for induction of labor 2021    Hypertension     PCOS (polycystic ovarian syndrome)     Pregnancy with 36 completed weeks gestation 2021    Primiparity, third trimester 2021       Past Surgical History:   Procedure Laterality Date    APPENDECTOMY  2013     SECTION N/A 2021

## 2024-05-28 LAB
CHOLEST SERPL-MCNC: 181 MG/DL
GLUCOSE SERPL-MCNC: 127 MG/DL (ref 74–99)
HDLC SERPL-MCNC: 60 MG/DL
LDLC SERPL CALC-MCNC: 104 MG/DL
PATIENT FASTING?: YES
TRIGL SERPL-MCNC: 87 MG/DL
VLDLC SERPL CALC-MCNC: 17 MG/DL

## 2024-05-29 DIAGNOSIS — I49.3 FREQUENT PVCS: ICD-10-CM

## 2024-05-29 LAB — HBA1C MFR BLD: 6.1 % (ref 4–5.6)

## 2024-06-11 ENCOUNTER — TELEPHONE (OUTPATIENT)
Dept: FAMILY MEDICINE CLINIC | Age: 34
End: 2024-06-11

## 2024-06-11 NOTE — TELEPHONE ENCOUNTER
Wojciech called get a referral to Dr. Villagran (low R back pain and tingling to R knee, talked to Rahul about this) and a referral to weight management (your pick).  Also, she had a heart monitor put on by Dr. Castro.  Her rate was very elevated so he made a medication change which was not put in her chart.  Please call her or send a message on My Chart or if you need to see her can you can get her in soon?    Please advise

## 2024-06-20 ENCOUNTER — OFFICE VISIT (OUTPATIENT)
Dept: OBGYN | Age: 34
End: 2024-06-20
Payer: COMMERCIAL

## 2024-06-20 VITALS
WEIGHT: 293 LBS | HEART RATE: 76 BPM | BODY MASS INDEX: 53.71 KG/M2 | SYSTOLIC BLOOD PRESSURE: 115 MMHG | DIASTOLIC BLOOD PRESSURE: 73 MMHG

## 2024-06-20 DIAGNOSIS — N89.8 VAGINAL DRYNESS: ICD-10-CM

## 2024-06-20 DIAGNOSIS — N93.9 ABNORMAL UTERINE BLEEDING (AUB): ICD-10-CM

## 2024-06-20 DIAGNOSIS — Z01.419 ENCOUNTER FOR GYNECOLOGICAL EXAMINATION: Primary | ICD-10-CM

## 2024-06-20 PROCEDURE — 99213 OFFICE O/P EST LOW 20 MIN: CPT | Performed by: OBSTETRICS & GYNECOLOGY

## 2024-06-20 PROCEDURE — 3078F DIAST BP <80 MM HG: CPT | Performed by: OBSTETRICS & GYNECOLOGY

## 2024-06-20 PROCEDURE — 3074F SYST BP LT 130 MM HG: CPT | Performed by: OBSTETRICS & GYNECOLOGY

## 2024-06-20 PROCEDURE — 99385 PREV VISIT NEW AGE 18-39: CPT | Performed by: OBSTETRICS & GYNECOLOGY

## 2024-06-20 RX ORDER — ESTRADIOL 0.1 MG/G
1 CREAM VAGINAL SEE ADMIN INSTRUCTIONS
Qty: 45 G | Refills: 3 | Status: SHIPPED | OUTPATIENT
Start: 2024-06-20

## 2024-06-20 RX ORDER — ACETAMINOPHEN AND CODEINE PHOSPHATE 120; 12 MG/5ML; MG/5ML
1 SOLUTION ORAL DAILY
Qty: 30 TABLET | Refills: 11 | Status: SHIPPED | OUTPATIENT
Start: 2024-06-20

## 2024-06-20 NOTE — PROGRESS NOTES
Patient alert and pleasant with no complaints  Here today for annual GYN visit. Breast tenderness has resolved from her last visit. Never had mammo done since symptoms resolved.   Last pap 6/8/2022 wnl  Hpv negative  
(Position: Sitting)   Pulse 76   Wt (!) 155.6 kg (343 lb)   LMP 06/04/2024 (Exact Date)   BMI 53.71 kg/m²       General appearance: alert, cooperative and in no acute distress.  Head: NCAT   Neck: Neck supple, no mass  Breasts: nontender, no masses palpable bilaterally, no dimpling, no discharge  Lungs: clear to auscultation bilaterally  Heart: regular rate and rhythm, no murmurs  Abdomen: soft, nontender, nondistended, no masses palpable, no rebound tenderness, no guarding or rigidity  Skin: No suspicious lesions  Neurologic: Alert and oriented, no focal deficits  Extremities: symmetrical without clubbing or cynosis  Psych: Alert, oriented, mood/affect full range, no acute distress    Pelvic Exam:   EXTERNAL GENITALIA: normal external genitalia, no external lesions  VAGINA: normal rugae, no discharge   CERVIX: normal appearing, no lesions, no bleeding  UTERUS: uterus is normal size, shape, consistency and nontender   ADNEXA: normal adnexa in size, nontender and no masses.  RECTUM: +hemorrhoids         ASSESSMENT : Routine Annual Exam,    Diagnosis Orders   1. Encounter for gynecological examination        2. Body mass index (BMI) 50.0-59.9, adult (HCC)        3. Vaginal dryness        4. Abnormal uterine bleeding (AUB)  norethindrone (ORTHO MICRONOR) 0.35 MG tablet             PLAN:    Return in about 1 year (around 6/20/2025) for Annual exam.      Orders Placed This Encounter   Medications    estradiol (ESTRACE VAGINAL) 0.1 MG/GM vaginal cream     Sig: Place 1 g vaginally See Admin Instructions Place 1g vaginally x2 weeks, then 2-3x a week.     Dispense:  45 g     Refill:  3    norethindrone (ORTHO MICRONOR) 0.35 MG tablet     Sig: Take 1 tablet by mouth daily     Dispense:  30 tablet     Refill:  11       No orders of the defined types were placed in this encounter.        Electronically signed by Chelsea Julian DO on 6/15/23

## 2024-06-21 ENCOUNTER — TELEPHONE (OUTPATIENT)
Dept: OBGYN CLINIC | Age: 34
End: 2024-06-21

## 2024-06-24 ENCOUNTER — PATIENT MESSAGE (OUTPATIENT)
Dept: OBGYN | Age: 34
End: 2024-06-24

## 2024-06-26 RX ORDER — DROSPIRENONE 4 MG/1
1 TABLET, FILM COATED ORAL DAILY
Qty: 30 TABLET | Refills: 11 | Status: SHIPPED | OUTPATIENT
Start: 2024-06-26

## 2024-06-26 NOTE — TELEPHONE ENCOUNTER
From: Wojciech Drews  To: Dr. Chelsea Julian  Sent: 6/24/2024 11:32 PM EDT  Subject: Birth control    Would it be okay to switch back to Slynd? I know I have to pay for it on my insurance but I liked it better.

## 2024-08-21 DIAGNOSIS — I10 ESSENTIAL HYPERTENSION: ICD-10-CM

## 2024-08-21 NOTE — TELEPHONE ENCOUNTER
Last Appointment:  4/12/2024  Future Appointments   Date Time Provider Department Center   8/22/2024 10:30 AM Ailyn Black MD [facility]   6/26/2025 11:15 AM Chelsea Julian DO [facility]

## 2024-08-22 ENCOUNTER — OFFICE VISIT (OUTPATIENT)
Dept: PAIN MANAGEMENT | Age: 34
End: 2024-08-22
Payer: COMMERCIAL

## 2024-08-22 VITALS
WEIGHT: 293 LBS | BODY MASS INDEX: 45.99 KG/M2 | DIASTOLIC BLOOD PRESSURE: 76 MMHG | RESPIRATION RATE: 18 BRPM | OXYGEN SATURATION: 97 % | SYSTOLIC BLOOD PRESSURE: 115 MMHG | HEART RATE: 96 BPM | TEMPERATURE: 96.9 F | HEIGHT: 67 IN

## 2024-08-22 DIAGNOSIS — G89.29 CHRONIC BILATERAL LOW BACK PAIN WITH BILATERAL SCIATICA: Primary | ICD-10-CM

## 2024-08-22 DIAGNOSIS — G89.29 CHRONIC PAIN OF RIGHT HIP: ICD-10-CM

## 2024-08-22 DIAGNOSIS — M25.551 CHRONIC PAIN OF RIGHT HIP: ICD-10-CM

## 2024-08-22 DIAGNOSIS — M54.42 CHRONIC BILATERAL LOW BACK PAIN WITH BILATERAL SCIATICA: Primary | ICD-10-CM

## 2024-08-22 DIAGNOSIS — M54.41 CHRONIC BILATERAL LOW BACK PAIN WITH BILATERAL SCIATICA: Primary | ICD-10-CM

## 2024-08-22 PROCEDURE — 3078F DIAST BP <80 MM HG: CPT | Performed by: STUDENT IN AN ORGANIZED HEALTH CARE EDUCATION/TRAINING PROGRAM

## 2024-08-22 PROCEDURE — 3074F SYST BP LT 130 MM HG: CPT | Performed by: STUDENT IN AN ORGANIZED HEALTH CARE EDUCATION/TRAINING PROGRAM

## 2024-08-22 PROCEDURE — 99204 OFFICE O/P NEW MOD 45 MIN: CPT | Performed by: STUDENT IN AN ORGANIZED HEALTH CARE EDUCATION/TRAINING PROGRAM

## 2024-08-22 NOTE — PROGRESS NOTES
[facility] - Pain Medicine  [address] [place] [address]  Pain Medicine Consult Note    Patient:  Wojciech Arredondo,  1990  Date of Service:  24  Requesting Physician:  No ref. provider found  Chief Complaint: Consultation (Low back radiating to bilateral thighs)      HISTORY OF PRESENT ILLNESS:      Ms. Wojciech Arredondo is a 34 y.o. female presented today for evaluation of  low back  that has been ongoing for the past 10 months    She   has a past medical history of HTN, PCOS, DM, asthma      Blood Thinners/Anticoagulation: no  Herbal Supplements: no  Pertinent Allergies: no  Diabetic: yes - gestational   Bowel/Bladder Incontinence: no  Relevant Surgeries: No   Relevant Recent procedures: No     Pain:  Location: Low back radiating to bilateral thighs  Inciting Factor: nothing  Duration: 10 months  Description: constant  Quality: aching and numb and tingling.    Level (worst): 5  Radiation:  yes - bilateral thighs to the knee   Numbness/Tingling: yes - bilateral thighs, right foot  Aggravating factors: standing.   Alleviating factors: rest and lying down.     Blood Thinners/Anticoagulation:  NONE              If so, managed by: N/A  Herbal Supplements: no     Medications:    NSAID's :   ibuprofen   Tylenol: Yes    Patches/Gels:   lidocaine patches (Lidoderm) previously  Membrane stabilizers :   Current -   no  Previous -  NONE  Opioids :   Current -  NONE   Previous -   NONE   Muscle Relaxants:    NONE  Steroids: yes - for unrelated illness- did notice results in pain   Benzodiazepines:  NONE  Anti-depres/Anti-Pscyh: NONE   Adjuvants or Others : no       Previous treatments:    Physical Therapy : No    Chiropractic treatment: Yes   TENS Unit: Yes with chiropractor  Previous Pain Physicians: no   Previous Procedure: no     Current Medications:   Clobetasol Propionate, Drospirenone, albuterol sulfate HFA, carvedilol, estradiol, fluticasone, hydroCHLOROthiazide,

## 2024-08-22 NOTE — PROGRESS NOTES
Patient:  Wojciech PERERA Maria Elena,  1990  Date of Service:  24      Patient presents to [facility] with complaints of low back radiating to bilateral thigh pain     Pain:  Location: Low back radiating to bilateral thighs  Inciting Factor: nothing  Duration: 10 months  Description: constant  Quality: aching and numb and tingling.    Level (worst): 5  Radiation:  yes - bilateral thighs  Numbness/Tingling: yes - bilateral thighs, right foot  Aggravating factors: standing.   Alleviating factors: rest and lying down.    Blood Thinners/Anticoagulation:  NONE   If so, managed by: N/A  Herbal Supplements: no    Medications:    NSAID's :   PRN  Tylenol: No   Patches/Gels:   lidocaine patches (Lidoderm) previously  Membrane stabilizers :   Current -   NONE  Previous -  NONE  Opioids :   Current -  NONE   Previous -   NONE   Muscle Relaxants:    NONE  Steroids: yes - for unrelated illness- did notice results in pain   Benzodiazepines:  NONE  Anti-depres/Anti-Pscyh: NONE   Adjuvants or Others : no      Previous treatments:    Physical Therapy : No    Chiropractic treatment: Yes   TENS Unit: Yes with chiropractor  Previous Pain Physicians: no   Previous Procedure: no     /76   Pulse 96   Temp 96.9 °F (36.1 °C) (Infrared)   Resp 18   Ht 1.702 m (5' 7\")   Wt (!) 155.6 kg (343 lb 0.6 oz)   SpO2 97%   BMI 53.73 kg/m²     No LMP recorded.

## 2024-08-26 RX ORDER — HYDROCHLOROTHIAZIDE 25 MG/1
25 TABLET ORAL DAILY
Qty: 90 TABLET | Refills: 3 | Status: SHIPPED | OUTPATIENT
Start: 2024-08-26

## 2024-08-26 RX ORDER — LOSARTAN POTASSIUM 100 MG/1
100 TABLET ORAL DAILY
Qty: 90 TABLET | Refills: 3 | Status: SHIPPED | OUTPATIENT
Start: 2024-08-26

## 2024-08-26 RX ORDER — CARVEDILOL 12.5 MG/1
12.5 TABLET ORAL 2 TIMES DAILY
Qty: 180 TABLET | Refills: 1 | Status: SHIPPED | OUTPATIENT
Start: 2024-08-26

## 2024-09-12 ENCOUNTER — OFFICE VISIT (OUTPATIENT)
Dept: PAIN MANAGEMENT | Age: 34
End: 2024-09-12
Payer: COMMERCIAL

## 2024-09-12 VITALS
TEMPERATURE: 98.2 F | DIASTOLIC BLOOD PRESSURE: 80 MMHG | HEIGHT: 67 IN | SYSTOLIC BLOOD PRESSURE: 116 MMHG | RESPIRATION RATE: 16 BRPM | OXYGEN SATURATION: 96 % | BODY MASS INDEX: 45.99 KG/M2 | HEART RATE: 83 BPM | WEIGHT: 293 LBS

## 2024-09-12 DIAGNOSIS — M25.551 CHRONIC PAIN OF RIGHT HIP: ICD-10-CM

## 2024-09-12 DIAGNOSIS — M54.42 CHRONIC BILATERAL LOW BACK PAIN WITH BILATERAL SCIATICA: Primary | ICD-10-CM

## 2024-09-12 DIAGNOSIS — G89.29 CHRONIC PAIN OF RIGHT HIP: ICD-10-CM

## 2024-09-12 DIAGNOSIS — M16.0 BILATERAL PRIMARY OSTEOARTHRITIS OF HIP: ICD-10-CM

## 2024-09-12 DIAGNOSIS — M54.41 CHRONIC BILATERAL LOW BACK PAIN WITH BILATERAL SCIATICA: Primary | ICD-10-CM

## 2024-09-12 DIAGNOSIS — G89.29 CHRONIC BILATERAL LOW BACK PAIN WITH BILATERAL SCIATICA: Primary | ICD-10-CM

## 2024-09-12 PROCEDURE — 99214 OFFICE O/P EST MOD 30 MIN: CPT | Performed by: STUDENT IN AN ORGANIZED HEALTH CARE EDUCATION/TRAINING PROGRAM

## 2024-09-12 PROCEDURE — 3074F SYST BP LT 130 MM HG: CPT | Performed by: STUDENT IN AN ORGANIZED HEALTH CARE EDUCATION/TRAINING PROGRAM

## 2024-09-12 PROCEDURE — 3079F DIAST BP 80-89 MM HG: CPT | Performed by: STUDENT IN AN ORGANIZED HEALTH CARE EDUCATION/TRAINING PROGRAM

## 2024-09-12 NOTE — PROGRESS NOTES
Patient alert and pleasant with no complaints  Here today for annual GYN visit. Pelvic exam performed, pap smear obtained, labeled and hand delivered to lab. Discharge instructions have been discussed with the patient. Patient advised to call our office with any questions or concerns. Voiced understanding. alone

## 2024-10-07 ENCOUNTER — OFFICE VISIT (OUTPATIENT)
Dept: FAMILY MEDICINE CLINIC | Age: 34
End: 2024-10-07
Payer: COMMERCIAL

## 2024-10-07 VITALS
BODY MASS INDEX: 45.99 KG/M2 | HEART RATE: 56 BPM | OXYGEN SATURATION: 99 % | SYSTOLIC BLOOD PRESSURE: 126 MMHG | WEIGHT: 293 LBS | HEIGHT: 67 IN | TEMPERATURE: 97.1 F | DIASTOLIC BLOOD PRESSURE: 84 MMHG

## 2024-10-07 DIAGNOSIS — L02.91 ABSCESS: Primary | ICD-10-CM

## 2024-10-07 PROCEDURE — 99213 OFFICE O/P EST LOW 20 MIN: CPT | Performed by: STUDENT IN AN ORGANIZED HEALTH CARE EDUCATION/TRAINING PROGRAM

## 2024-10-07 PROCEDURE — 3079F DIAST BP 80-89 MM HG: CPT | Performed by: STUDENT IN AN ORGANIZED HEALTH CARE EDUCATION/TRAINING PROGRAM

## 2024-10-07 PROCEDURE — 3074F SYST BP LT 130 MM HG: CPT | Performed by: STUDENT IN AN ORGANIZED HEALTH CARE EDUCATION/TRAINING PROGRAM

## 2024-10-07 RX ORDER — DOXYCYCLINE HYCLATE 100 MG
100 TABLET ORAL 2 TIMES DAILY
Qty: 20 TABLET | Refills: 0 | Status: SHIPPED | OUTPATIENT
Start: 2024-10-07 | End: 2024-10-17

## 2024-10-07 ASSESSMENT — ENCOUNTER SYMPTOMS
ABDOMINAL PAIN: 0
NAUSEA: 0
COUGH: 0
RHINORRHEA: 0
SHORTNESS OF BREATH: 0
VOMITING: 0

## 2024-10-07 NOTE — PROGRESS NOTES
Wojciech Arredondo (:  1990) is a 34 y.o. female,Established patient, here for evaluation of the following chief complaint(s):  Other (Patient reports what she thinks is a boil or abscess to the right flank area that appeared last week. Patient reports that she thinks that it is inflamed and that it hurts.)    Infected sebaceous cyst vs cellulitis? At this time not certain I&D would reveal and significant fluid collection. Start abx. Return later this week, early next week if not improving. Discussed return and ER precautions. Discussed benefits, risks and expected course of therapy as well as other options. Opportunity given to ask questions. Patient and or parent verbalized understanding       Assessment & Plan  Abscess       Orders:    doxycycline hyclate (VIBRA-TABS) 100 MG tablet; Take 1 tablet by mouth 2 times daily for 10 days      No follow-ups on file.       Subjective   R flank discomfort, area of pain and redness  Has tried to get fluid/pus out of it-not having drainage  Has been going on a few days  No fevers  Not bleeding        Review of Systems   Constitutional:  Negative for chills and fever.   HENT:  Negative for congestion and rhinorrhea.    Respiratory:  Negative for cough and shortness of breath.    Cardiovascular:  Negative for chest pain and leg swelling.   Gastrointestinal:  Negative for abdominal pain, nausea and vomiting.   Genitourinary:  Negative for dysuria and hematuria.   Musculoskeletal:  Negative for arthralgias and myalgias.   Skin:  Positive for rash and wound.   Neurological:  Negative for dizziness and light-headedness.          Objective   Physical Exam  Vitals reviewed.   Constitutional:       General: She is not in acute distress.  HENT:      Head: Normocephalic and atraumatic.   Eyes:      Extraocular Movements: Extraocular movements intact.      Conjunctiva/sclera: Conjunctivae normal.   Cardiovascular:      Rate and Rhythm: Normal rate.   Pulmonary:      Effort:

## 2024-10-09 ENCOUNTER — PATIENT MESSAGE (OUTPATIENT)
Dept: FAMILY MEDICINE CLINIC | Age: 34
End: 2024-10-09

## 2024-10-09 SDOH — ECONOMIC STABILITY: FOOD INSECURITY: WITHIN THE PAST 12 MONTHS, YOU WORRIED THAT YOUR FOOD WOULD RUN OUT BEFORE YOU GOT MONEY TO BUY MORE.: PATIENT DECLINED

## 2024-10-09 SDOH — ECONOMIC STABILITY: FOOD INSECURITY: WITHIN THE PAST 12 MONTHS, THE FOOD YOU BOUGHT JUST DIDN'T LAST AND YOU DIDN'T HAVE MONEY TO GET MORE.: PATIENT DECLINED

## 2024-10-09 SDOH — ECONOMIC STABILITY: TRANSPORTATION INSECURITY
IN THE PAST 12 MONTHS, HAS LACK OF TRANSPORTATION KEPT YOU FROM MEETINGS, WORK, OR FROM GETTING THINGS NEEDED FOR DAILY LIVING?: PATIENT DECLINED

## 2024-10-09 SDOH — ECONOMIC STABILITY: INCOME INSECURITY: HOW HARD IS IT FOR YOU TO PAY FOR THE VERY BASICS LIKE FOOD, HOUSING, MEDICAL CARE, AND HEATING?: PATIENT DECLINED

## 2024-10-10 ENCOUNTER — OFFICE VISIT (OUTPATIENT)
Dept: FAMILY MEDICINE CLINIC | Age: 34
End: 2024-10-10

## 2024-10-10 VITALS
BODY MASS INDEX: 52.16 KG/M2 | DIASTOLIC BLOOD PRESSURE: 84 MMHG | SYSTOLIC BLOOD PRESSURE: 124 MMHG | TEMPERATURE: 97.4 F | WEIGHT: 293 LBS | OXYGEN SATURATION: 95 % | HEART RATE: 80 BPM

## 2024-10-10 DIAGNOSIS — L02.91 ABSCESS: Primary | ICD-10-CM

## 2024-10-10 NOTE — PROGRESS NOTES
[facility]  Office Progress Note  Dr. Jorge L Og      Patient:  Wojciech PERERA Masters 34 y.o. female     Date of Service: 10/10/24      Chief complaint:   Chief Complaint   Patient presents with    Cyst     Right flank         History of Present Illness   The patient is a 34 y.o. female  here to follow up of their cyst, abscess.    R side/flank. Worsening despite abx and conservative treatment. No drainage. No fever.         Past Medical History:      Diagnosis Date    Diet controlled gestational diabetes mellitus (GDM), antepartum 1/22/2021    Diet controlled gestational diabetes mellitus (GDM), antepartum 1/22/2021    Elevated blood pressure affecting pregnancy in third trimester, antepartum     Encounter for induction of labor 2/5/2021    Hypertension     PCOS (polycystic ovarian syndrome)     Pregnancy with 36 completed weeks gestation 1/27/2021    Primiparity, third trimester 1/14/2021       Review of Systems:   Review of Systems   Constitutional:  Negative for chills and fever.   Respiratory:  Negative for cough and shortness of breath.    Cardiovascular:  Negative for chest pain and leg swelling.   Gastrointestinal:  Negative for abdominal pain, nausea and vomiting.   Skin:  Positive for wound. Negative for rash.   Neurological:  Negative for dizziness and light-headedness.       Physical Exam   Vitals: /84   Pulse 80   Temp 97.4 °F (36.3 °C)   Wt (!) 151 kg (333 lb)   LMP 10/04/2024 (Exact Date)   SpO2 95%   BMI 52.16 kg/m²   Physical Exam    General:  Well developed, well nourished, well groomed.  No apparent distress.  HEENT:  Normocephalic, atraumatic.  No scleral icterus.  No conjunctival injection. No nasal discharge.  Heart:  RRR, no murmurs, gallops, or rubs  Lungs:  CTA bilaterally, bilat symmetrical expansion, no wheeze, rales, or rhonchi  Abdomen: Circular area of induration, erythema on the R flank approx 5 cm in diameter.   Extremities:  No clubbing, cyanosis, or

## 2024-10-16 ASSESSMENT — ENCOUNTER SYMPTOMS
COUGH: 0
NAUSEA: 0
ABDOMINAL PAIN: 0
VOMITING: 0
SHORTNESS OF BREATH: 0

## 2024-12-27 DIAGNOSIS — I10 ESSENTIAL HYPERTENSION: ICD-10-CM

## 2024-12-27 RX ORDER — HYDROCHLOROTHIAZIDE 25 MG/1
25 TABLET ORAL DAILY
Qty: 90 TABLET | Refills: 3 | Status: SHIPPED | OUTPATIENT
Start: 2024-12-27

## 2024-12-27 RX ORDER — HYDROCHLOROTHIAZIDE 25 MG/1
25 TABLET ORAL DAILY
Qty: 90 TABLET | Refills: 3 | OUTPATIENT
Start: 2024-12-27

## 2024-12-27 RX ORDER — LOSARTAN POTASSIUM 100 MG/1
100 TABLET ORAL DAILY
Qty: 90 TABLET | Refills: 3 | Status: SHIPPED | OUTPATIENT
Start: 2024-12-27

## 2024-12-27 RX ORDER — CARVEDILOL 12.5 MG/1
12.5 TABLET ORAL 2 TIMES DAILY
Qty: 180 TABLET | Refills: 1 | OUTPATIENT
Start: 2024-12-27

## 2024-12-27 RX ORDER — CARVEDILOL 12.5 MG/1
12.5 TABLET ORAL 2 TIMES DAILY
Qty: 180 TABLET | Refills: 3 | Status: SHIPPED | OUTPATIENT
Start: 2024-12-27

## 2024-12-27 RX ORDER — LOSARTAN POTASSIUM 100 MG/1
100 TABLET ORAL DAILY
Qty: 90 TABLET | Refills: 3 | OUTPATIENT
Start: 2024-12-27

## 2024-12-27 NOTE — TELEPHONE ENCOUNTER
Name of Medication(s) Requested:  Requested Prescriptions     Pending Prescriptions Disp Refills    carvedilol (COREG) 12.5 MG tablet 180 tablet 3     Sig: Take 1 tablet by mouth 2 times daily daily     Refused Prescriptions Disp Refills    carvedilol (COREG) 12.5 MG tablet 180 tablet 1     Sig: Take 1 tablet by mouth 2 times daily daily     Refused By: NILSON EAGLE     Reason for Refusal: Patient has requested refill too soon    hydroCHLOROthiazide (HYDRODIURIL) 25 MG tablet 90 tablet 3     Sig: Take 1 tablet by mouth daily     Refused By: NILSON EAGLE     Reason for Refusal: Patient has requested refill too soon    losartan (COZAAR) 100 MG tablet 90 tablet 3     Sig: Take 1 tablet by mouth daily     Refused By: NILSON EAGLE     Reason for Refusal: Patient has requested refill too soon       Medication is on current medication list Yes    Dosage and directions were verified? Yes    Quantity verified: 90 day supply     Pharmacy Verified?  Yes    Last Appointment:  10/10/2024    Future appts:  Future Appointments   Date Time Provider Department Center   6/26/2025 11:15 AM Chelsea Julian DO [facility]        (If no appt send self scheduling link. .REFILLAPPT)  Scheduling request sent?     [] Yes  [x] No    Does patient need updated?  [] Yes  [x] No

## 2025-01-23 ENCOUNTER — OFFICE VISIT (OUTPATIENT)
Dept: FAMILY MEDICINE CLINIC | Age: 35
End: 2025-01-23

## 2025-01-23 VITALS
OXYGEN SATURATION: 98 % | TEMPERATURE: 97.6 F | WEIGHT: 293 LBS | SYSTOLIC BLOOD PRESSURE: 110 MMHG | BODY MASS INDEX: 46.2 KG/M2 | DIASTOLIC BLOOD PRESSURE: 72 MMHG | HEART RATE: 71 BPM

## 2025-01-23 DIAGNOSIS — J01.40 ACUTE NON-RECURRENT PANSINUSITIS: Primary | ICD-10-CM

## 2025-01-23 LAB
INFLUENZA A ANTIBODY: NORMAL
INFLUENZA B ANTIBODY: NORMAL
S PYO AG THROAT QL: NORMAL

## 2025-01-23 RX ORDER — METHYLPREDNISOLONE 4 MG/1
TABLET ORAL
Qty: 1 KIT | Refills: 0 | Status: SHIPPED | OUTPATIENT
Start: 2025-01-23 | End: 2025-01-29

## 2025-01-23 RX ORDER — CEFDINIR 300 MG/1
300 CAPSULE ORAL 2 TIMES DAILY
Qty: 20 CAPSULE | Refills: 0 | Status: SHIPPED | OUTPATIENT
Start: 2025-01-23 | End: 2025-02-02

## 2025-01-23 NOTE — PROGRESS NOTES
Chief Complaint       Cough (X3-4 days), Congestion (Ibuprofen OTC), Pharyngitis, and Ear Pain (bilateral)      History of Present Illness   Source of history provided by:  patient.    Wojciech Arredondo is a 34 y.o. old female presenting to the walk in clinic for evaluation of sinus pressure, nonproductive cough, nasal congestion, sore throat, and bilateral ear pain which has been present for the past 3 to 4 days. Denies any fever, CP, dyspnea, LE edema, abdominal pain, vomiting, rash, or lethargy. Denies any hx of asthma.  Patient's daughter is currently sick with similar illness.  Patient has been taking ibuprofen OTC with minimal relief of symptoms.    ROS    Unless otherwise stated in this report or unable to obtain because of the patient's clinical or mental status as evidenced by the medical record, this patients's positive and negative responses for Review of Systems, constitutional, psych, eyes, ENT, cardiovascular, respiratory, gastrointestinal, neurological, genitourinary, musculoskeletal, integument systems and systems related to the presenting problem are either stated in the preceding or were not pertinent or were negative for the symptoms and/or complaints related to the medical problem.    Past Medical History:  has a past medical history of Diet controlled gestational diabetes mellitus (GDM), antepartum, Diet controlled gestational diabetes mellitus (GDM), antepartum, Elevated blood pressure affecting pregnancy in third trimester, antepartum, Encounter for induction of labor, Hypertension, PCOS (polycystic ovarian syndrome), Pregnancy with 36 completed weeks gestation, and Primiparity, third trimester.  Past Surgical History:  has a past surgical history that includes Appendectomy () and  section (N/A, 2021).  Social History:  reports that she has never smoked. She has never used smokeless tobacco. She reports that she does not currently use alcohol. She reports that she does not

## 2025-05-29 LAB
CHOLEST SERPL-MCNC: 189 MG/DL
GLUCOSE SERPL-MCNC: 93 MG/DL (ref 74–99)
HDLC SERPL-MCNC: 60 MG/DL
LDLC SERPL CALC-MCNC: 116 MG/DL
PATIENT FASTING?: YES
TRIGL SERPL-MCNC: 66 MG/DL
VLDLC SERPL CALC-MCNC: 13 MG/DL

## 2025-06-26 ENCOUNTER — OFFICE VISIT (OUTPATIENT)
Age: 35
End: 2025-06-26
Payer: COMMERCIAL

## 2025-06-26 VITALS
OXYGEN SATURATION: 98 % | DIASTOLIC BLOOD PRESSURE: 79 MMHG | TEMPERATURE: 97.9 F | SYSTOLIC BLOOD PRESSURE: 113 MMHG | HEART RATE: 91 BPM | BODY MASS INDEX: 46.52 KG/M2 | WEIGHT: 293 LBS

## 2025-06-26 DIAGNOSIS — Z01.419 ENCOUNTER FOR GYNECOLOGICAL EXAMINATION: Primary | ICD-10-CM

## 2025-06-26 PROCEDURE — 3074F SYST BP LT 130 MM HG: CPT | Performed by: OBSTETRICS & GYNECOLOGY

## 2025-06-26 PROCEDURE — 99395 PREV VISIT EST AGE 18-39: CPT | Performed by: OBSTETRICS & GYNECOLOGY

## 2025-06-26 PROCEDURE — 3078F DIAST BP <80 MM HG: CPT | Performed by: OBSTETRICS & GYNECOLOGY

## 2025-06-26 RX ORDER — KETOCONAZOLE 20 MG/G
CREAM TOPICAL
COMMUNITY
Start: 2025-03-24

## 2025-06-26 RX ORDER — DROSPIRENONE 4 MG/1
1 TABLET, FILM COATED ORAL DAILY
Qty: 30 TABLET | Refills: 13 | Status: SHIPPED | OUTPATIENT
Start: 2025-06-26

## 2025-06-26 RX ORDER — KETOCONAZOLE 20 MG/ML
SHAMPOO, SUSPENSION TOPICAL
COMMUNITY
Start: 2025-03-24

## 2025-06-26 SDOH — ECONOMIC STABILITY: FOOD INSECURITY: WITHIN THE PAST 12 MONTHS, YOU WORRIED THAT YOUR FOOD WOULD RUN OUT BEFORE YOU GOT MONEY TO BUY MORE.: NEVER TRUE

## 2025-06-26 SDOH — ECONOMIC STABILITY: FOOD INSECURITY: WITHIN THE PAST 12 MONTHS, THE FOOD YOU BOUGHT JUST DIDN'T LAST AND YOU DIDN'T HAVE MONEY TO GET MORE.: NEVER TRUE

## 2025-06-26 ASSESSMENT — PATIENT HEALTH QUESTIONNAIRE - PHQ9
2. FEELING DOWN, DEPRESSED OR HOPELESS: NOT AT ALL
SUM OF ALL RESPONSES TO PHQ QUESTIONS 1-9: 0
SUM OF ALL RESPONSES TO PHQ QUESTIONS 1-9: 0
1. LITTLE INTEREST OR PLEASURE IN DOING THINGS: NOT AT ALL
SUM OF ALL RESPONSES TO PHQ QUESTIONS 1-9: 0
SUM OF ALL RESPONSES TO PHQ QUESTIONS 1-9: 0

## 2025-08-12 ENCOUNTER — E-VISIT (OUTPATIENT)
Dept: FAMILY MEDICINE CLINIC | Age: 35
End: 2025-08-12
Payer: COMMERCIAL

## 2025-08-12 DIAGNOSIS — B96.89 ACUTE BACTERIAL SINUSITIS: Primary | ICD-10-CM

## 2025-08-12 DIAGNOSIS — J01.90 ACUTE BACTERIAL SINUSITIS: Primary | ICD-10-CM

## 2025-08-12 PROCEDURE — 99421 OL DIG E/M SVC 5-10 MIN: CPT | Performed by: STUDENT IN AN ORGANIZED HEALTH CARE EDUCATION/TRAINING PROGRAM

## 2025-08-12 RX ORDER — AZITHROMYCIN 250 MG/1
TABLET, FILM COATED ORAL
Qty: 6 TABLET | Refills: 0 | Status: SHIPPED | OUTPATIENT
Start: 2025-08-12 | End: 2025-08-22

## 2025-08-12 ASSESSMENT — LIFESTYLE VARIABLES: SMOKING_STATUS: NO, I'VE NEVER SMOKED

## 2025-09-02 DIAGNOSIS — I10 ESSENTIAL HYPERTENSION: ICD-10-CM

## 2025-09-02 RX ORDER — HYDROCHLOROTHIAZIDE 25 MG/1
25 TABLET ORAL DAILY
Qty: 90 TABLET | Refills: 3 | Status: SHIPPED | OUTPATIENT
Start: 2025-09-02

## 2025-09-02 RX ORDER — LOSARTAN POTASSIUM 100 MG/1
100 TABLET ORAL DAILY
Qty: 90 TABLET | Refills: 3 | Status: SHIPPED | OUTPATIENT
Start: 2025-09-02

## (undated) DEVICE — GOWN,SIRUS,POLYRNF,BRTHSLV,XLN/XL,20/CS: Brand: MEDLINE

## (undated) DEVICE — CONTAINER,SPEC,PNEUM TUBE,3OZ,STRL PATH: Brand: MEDLINE

## (undated) DEVICE — SKIN AFFIX SURG ADHESIVE 72/CS 0.55ML: Brand: MEDLINE

## (undated) DEVICE — GOWN,SIRUS,FABRNF,L,20/CS: Brand: MEDLINE

## (undated) DEVICE — PEN: MARKING STD 100/CS: Brand: MEDICAL ACTION INDUSTRIES

## (undated) DEVICE — Device: Brand: PORTEX

## (undated) DEVICE — CESAREAN BIRTH PACK II: Brand: MEDLINE INDUSTRIES, INC.

## (undated) DEVICE — SPONGE LAP W18XL18IN WHT COT 4 PLY FLD STRUNG RADPQ DISP ST

## (undated) DEVICE — GLOVE SURG SZ 65 L12IN FNGR THK83MIL CRM POLYISOPRENE

## (undated) DEVICE — STAPLER SKIN SQ 30 ABSRB STPL DISP INSORB

## (undated) DEVICE — PENCIL ES L3M BTTN SWCH HOLSTER W/ BLDE ELECTRD EDGE

## (undated) DEVICE — APPLICATOR PREP 26ML 0.7% IOD POVACRYLEX 74% ISO ALC ST

## (undated) DEVICE — 3000CC GUARDIAN II: Brand: GUARDIAN

## (undated) DEVICE — ELECTRODE PT RET AD L9FT HI MOIST COND ADH HYDRGEL CORDED

## (undated) DEVICE — HYPODERMIC SAFETY NEEDLE: Brand: MAGELLAN

## (undated) DEVICE — MEDI-VAC YANKAUER SUCTION HANDLE W/BULBOUS TIP: Brand: CARDINAL HEALTH

## (undated) DEVICE — SUTURE PLN GUT SZ 3-0 L27IN ABSRB YELLOWISH TAN L36MM CT-1 842H

## (undated) DEVICE — TOWEL,OR,DSP,ST,BLUE,STD,6/PK,12PK/CS: Brand: MEDLINE

## (undated) DEVICE — COVER,LIGHT HANDLE,FLX,2/PK: Brand: MEDLINE INDUSTRIES, INC.

## (undated) DEVICE — SHEET,DRAPE,53X77,STERILE: Brand: MEDLINE

## (undated) DEVICE — COUNTER NDL 30 COUNT DBL MAG

## (undated) DEVICE — SUTURE STRATAFIX SPRL SZ 1 L14IN ABSRB VLT L48CM CTX 1/2 SXPD2B405

## (undated) DEVICE — SUTURE CHROMIC GUT SZ 1 L36IN ABSRB BRN L40MM CT 1/2 CIR 915H

## (undated) DEVICE — BLADE SURG NO20 S STL STR DISP GLASSVAN

## (undated) DEVICE — TUBE BLD COLLECT ST 1 SIL COAT 7ML 10ML

## (undated) DEVICE — CATHETERIZATION KIT FOL16 FR 2000 CC DRAINAGE BG LUBRICATH

## (undated) DEVICE — CONTAINER SPEC 64OZ POLYPR PATH SNAP LOK CAP W/ LID

## (undated) DEVICE — GLOVE,SURG,SENSICARE,ALOE,LF,PF,7: Brand: MEDLINE

## (undated) DEVICE — TUBING, SUCTION, 3/16" X 12', STRAIGHT: Brand: MEDLINE

## (undated) DEVICE — GLOVE ORANGE PI 7   MSG9070